# Patient Record
Sex: FEMALE | Race: WHITE | NOT HISPANIC OR LATINO | Employment: OTHER
[De-identification: names, ages, dates, MRNs, and addresses within clinical notes are randomized per-mention and may not be internally consistent; named-entity substitution may affect disease eponyms.]

---

## 2017-08-19 ENCOUNTER — HEALTH MAINTENANCE LETTER (OUTPATIENT)
Age: 67
End: 2017-08-19

## 2017-12-12 ENCOUNTER — OFFICE VISIT (OUTPATIENT)
Dept: DERMATOLOGY | Facility: CLINIC | Age: 67
End: 2017-12-12
Payer: MEDICARE

## 2017-12-12 DIAGNOSIS — R22.9 SUBCUTANEOUS NODULE: ICD-10-CM

## 2017-12-12 DIAGNOSIS — L80 VITILIGO: Primary | ICD-10-CM

## 2017-12-12 DIAGNOSIS — D22.9 MULTIPLE BENIGN NEVI: ICD-10-CM

## 2017-12-12 DIAGNOSIS — L57.0 AK (ACTINIC KERATOSIS): ICD-10-CM

## 2017-12-12 LAB — TSH SERPL DL<=0.005 MIU/L-ACNC: 3.3 MU/L (ref 0.4–4)

## 2017-12-12 PROCEDURE — 17000 DESTRUCT PREMALG LESION: CPT | Performed by: DERMATOLOGY

## 2017-12-12 PROCEDURE — 36415 COLL VENOUS BLD VENIPUNCTURE: CPT | Performed by: DERMATOLOGY

## 2017-12-12 PROCEDURE — 84443 ASSAY THYROID STIM HORMONE: CPT | Performed by: DERMATOLOGY

## 2017-12-12 PROCEDURE — 99213 OFFICE O/P EST LOW 20 MIN: CPT | Mod: 25 | Performed by: DERMATOLOGY

## 2017-12-12 NOTE — LETTER
12/12/2017      RE: Crys Gama  [address] [place] [address]       [facility] Dermatology Note      Dermatology Problem List:  1. Vitiligo, began at age 25    Encounter Date: Dec 12, 2017    CC:  Chief Complaint   Patient presents with     Skin Check     area of concern left wrist and left inner thigh     Derm Problem     vitiligo         History of Present Illness:  Ms. Crys Gama is a 67 year old female who presents as a follow-up for vitiligo. The patient was last seen 12/12/2016 when a nodule on the left thigh was reported and a decision was made to clinically monitor it. She reports her viitlgo may be spreading. Not bothersome and does not want to treat.  The patient has an area of concern on her left wrist present for at least 3 moths and asymptomatic and on her left inner thigh that is a bump present for over 1 year    The patient reports no other lesions of concern.      Past Medical History:   Patient Active Problem List   Diagnosis     Hyperlipidemia     HTN (hypertension)     GERD (gastroesophageal reflux disease)     Asthma, mild intermittent     HYPERLIPIDEMIA LDL GOAL <130     Hypertension goal BP (blood pressure) < 140/90     Advanced directives, counseling/discussion     Osteoporosis     Osteoporosis, post-menopausal     Angioma of skin     Past Medical History:   Diagnosis Date     Asthma, mild intermittent      Family history of colon cancer     had colonoscopy in 2008, due in 5 years     GERD (gastroesophageal reflux disease)      HTN (hypertension)      Hyperlipidemia      Osteoporosis      Vitiligo      Past Surgical History:   Procedure Laterality Date     HC REMOVAL GALLBLADDER  4-1999     SURGICAL HISTORY OF -       wrist and finger surgery for fracture         Social History:  The patient denies use of tanning beds. She had grandchildren.     Family History:  There is a family history of BCC, mother.     Medications:  Current Outpatient  Prescriptions   Medication Sig Dispense Refill     simvastatin (ZOCOR) 10 MG tablet Take 10 mg by mouth At Bedtime       Alendronate Sodium (FOSAMAX PO) Take 5 mg by mouth       KRILL OIL PO Take  by mouth. 2 DAILY       Multiple Vitamin (MULTI-DAY PO) Take 1 tablet by mouth daily.       CALCIUM + D OR 1500/800 daily       GLUCOSAMINE CHONDR 1500 COMPLX OR  tid         Allergies   Allergen Reactions     Azithromycin Hives     Erythromycin Hives     Penicillins Hives     Tetracycline Hives         Review of Systems:  -Const: Denies fevers or chills. Admits to intentional weight loss. Denies changes at home.   -ENT- replacing tooth today with dentist  Physical exam:  There were no vitals taken for this visit.  GEN: This is a well developed, well-nourished female in no acute distress, in a pleasant mood.    SKIN: Total skin excluding the undergarment areas was performed. The exam included the head/face, neck, both arms, chest, back, abdomen, both legs, digits and/or nails. Declines genital exam.   -There are bright red some shaped papules scattered on the trunk and extremtiies along with brown macules.   -1.2cm red patch, left dorsal wrist, completely blanches,   -SQ, pea sized nodule, left thigh  -1mm red macule, right nasal sidewall  -depigmented patches, primarily extremities  -There  1 erythematous macules with overlying adherent scale on the right nasal tip  -No other lesions of concern on areas examined.     Impression/Plan:  1. Multiple clinically benign nevi on the trunk and extremities and cherry angiomas    ABCDs of melanoma were discussed and self skin checks were advised.     Sun precaution was advised including the use of sun screens of SPF 30 or higher, sun protective clothing, and avoidance of tanning beds.      2. Vitiligo:    Discussed importance of sunscreen use and increased risk of burn.    Recheck thyroid with TSH and free T4  3. SQ- left thigh, unchanged    Histopathological exam required for  diagnosis however continue to clinically monitor at this time as she declines biopsy  4. 1.2cm red patch, left dorsal wrist, blanches, consistent with hemangioma    She will report changes  5. Papule, right nasal side wall, 3 month recheck recommended. Declines biopsy today. Most likely excoriated fibrous papule but need to monitor  6. AK, right nasal tip    Cryotherapy procedure note: After verbal consent and discussion of risks and benefits including but no limited to dyspigmentation/scar, blister, and pain, 1was(were) treated with 1-2mm freeze border for 2 cycles with liquid nitrogen. Post cryotherapy instructions were provided.   Follow up in 3 months, earlier for new or changing lesions.       Desi Rossi MD    Department of Dermatology  [facility]: Phone: [phone], Fax:[phone]  [facility]: Phone: [phone], Fax: [phone]        Desi Rossi MD

## 2017-12-12 NOTE — PATIENT INSTRUCTIONS
Cryotherapy    What is it?    Use of a very cold liquid, such as liquid nitrogen, to freeze and destroy abnormal skin cells that need to be removed    What should I expect?    Tenderness and redness    A small blister that might grow and fill with dark purple blood. There may be crusting.    More than one treatment may be needed if the lesions do not go away.    How do I care for the treated area?    Gently wash the area with your hands when bathing.    Use a thin layer of Vaseline to help with healing. You may use a Band-Aid.     The area should heal within 7-10 days and may leave behind a pink or lighter color.     Do not use an antibiotic or Neosporin ointment.     You may take acetaminophen (Tylenol) for pain.     Call your Doctor if you have:    Severe pain    Signs of infection (warmth, redness, cloudy yellow drainage, and or a bad smell)    Questions or concerns    Who should I call with questions?       [facility]: [phone]       [facility]: [phone]       For urgent needs outside of business hours call the [facility] at [phone]        and ask for the dermatology resident on call

## 2017-12-12 NOTE — NURSING NOTE
Dermatology Rooming Note    Crys Gama's goals for this visit include:   Chief Complaint   Patient presents with     Skin Check     area of concern left wrist and left inner thigh     Derm Problem     vitiligo       Is a scribe okay for this visit:YES    Are records needed for this visit(If yes, obtain release of information): No     Vitals: There were no vitals taken for this visit.    Referring Provider:  No referring provider defined for this encounter.    Beth Woody LPN

## 2017-12-12 NOTE — PROGRESS NOTES
[facility] Dermatology Note      Dermatology Problem List:  1. Vitiligo, began at age 25    Encounter Date: Dec 12, 2017    CC:  Chief Complaint   Patient presents with     Skin Check     area of concern left wrist and left inner thigh     Derm Problem     vitiligo         History of Present Illness:  Ms. Crys Gama is a 67 year old female who presents as a follow-up for vitiligo. The patient was last seen 12/12/2016 when a nodule on the left thigh was reported and a decision was made to clinically monitor it. She reports her viitlgo may be spreading. Not bothersome and does not want to treat.  The patient has an area of concern on her left wrist present for at least 3 moths and asymptomatic and on her left inner thigh that is a bump present for over 1 year    The patient reports no other lesions of concern.      Past Medical History:   Patient Active Problem List   Diagnosis     Hyperlipidemia     HTN (hypertension)     GERD (gastroesophageal reflux disease)     Asthma, mild intermittent     HYPERLIPIDEMIA LDL GOAL <130     Hypertension goal BP (blood pressure) < 140/90     Advanced directives, counseling/discussion     Osteoporosis     Osteoporosis, post-menopausal     Angioma of skin     Past Medical History:   Diagnosis Date     Asthma, mild intermittent      Family history of colon cancer     had colonoscopy in 2008, due in 5 years     GERD (gastroesophageal reflux disease)      HTN (hypertension)      Hyperlipidemia      Osteoporosis      Vitiligo      Past Surgical History:   Procedure Laterality Date     HC REMOVAL GALLBLADDER  4-1999     SURGICAL HISTORY OF -       wrist and finger surgery for fracture         Social History:  The patient denies use of tanning beds. She had grandchildren.     Family History:  There is a family history of BCC, mother.     Medications:  Current Outpatient Prescriptions   Medication Sig Dispense Refill     simvastatin (ZOCOR) 10 MG tablet Take 10 mg  by mouth At Bedtime       Alendronate Sodium (FOSAMAX PO) Take 5 mg by mouth       KRILL OIL PO Take  by mouth. 2 DAILY       Multiple Vitamin (MULTI-DAY PO) Take 1 tablet by mouth daily.       CALCIUM + D OR 1500/800 daily       GLUCOSAMINE CHONDR 1500 COMPLX OR  tid         Allergies   Allergen Reactions     Azithromycin Hives     Erythromycin Hives     Penicillins Hives     Tetracycline Hives         Review of Systems:  -Const: Denies fevers or chills. Admits to intentional weight loss. Denies changes at home.   -ENT- replacing tooth today with dentist  Physical exam:  There were no vitals taken for this visit.  GEN: This is a well developed, well-nourished female in no acute distress, in a pleasant mood.    SKIN: Total skin excluding the undergarment areas was performed. The exam included the head/face, neck, both arms, chest, back, abdomen, both legs, digits and/or nails. Declines genital exam.   -There are bright red some shaped papules scattered on the trunk and extremtiies along with brown macules.   -1.2cm red patch, left dorsal wrist, completely blanches,   -SQ, pea sized nodule, left thigh  -1mm red macule, right nasal sidewall  -depigmented patches, primarily extremities  -There  1 erythematous macules with overlying adherent scale on the right nasal tip  -No other lesions of concern on areas examined.     Impression/Plan:  1. Multiple clinically benign nevi on the trunk and extremities and cherry angiomas    ABCDs of melanoma were discussed and self skin checks were advised.     Sun precaution was advised including the use of sun screens of SPF 30 or higher, sun protective clothing, and avoidance of tanning beds.      2. Vitiligo:    Discussed importance of sunscreen use and increased risk of burn.    Recheck thyroid with TSH and free T4  3. SQ- left thigh, unchanged    Histopathological exam required for diagnosis however continue to clinically monitor at this time as she declines biopsy  4. 1.2cm red  patch, left dorsal wrist, blanches, consistent with hemangioma    She will report changes  5. Papule, right nasal side wall, 3 month recheck recommended. Declines biopsy today. Most likely excoriated fibrous papule but need to monitor  6. AK, right nasal tip    Cryotherapy procedure note: After verbal consent and discussion of risks and benefits including but no limited to dyspigmentation/scar, blister, and pain, 1was(were) treated with 1-2mm freeze border for 2 cycles with liquid nitrogen. Post cryotherapy instructions were provided.   Follow up in 3 months, earlier for new or changing lesions.       Desi Rossi MD    Department of Dermatology  [facility]: Phone: [phone], Fax:[phone]  [facility]: Phone: [phone], Fax: [phone]

## 2017-12-12 NOTE — MR AVS SNAPSHOT
After Visit Summary   12/12/2017    Crys Gama    MRN: [id number]           Patient Information     Date Of Birth          1950        Visit Information        Provider Department      12/12/2017 9:00 AM Desi Rossi MD [facility]        Today's Diagnoses     Vitiligo    -  1    Subcutaneous nodule        Multiple benign nevi        AK (actinic keratosis)          Care Instructions    Cryotherapy    What is it?    Use of a very cold liquid, such as liquid nitrogen, to freeze and destroy abnormal skin cells that need to be removed    What should I expect?    Tenderness and redness    A small blister that might grow and fill with dark purple blood. There may be crusting.    More than one treatment may be needed if the lesions do not go away.    How do I care for the treated area?    Gently wash the area with your hands when bathing.    Use a thin layer of Vaseline to help with healing. You may use a Band-Aid.     The area should heal within 7-10 days and may leave behind a pink or lighter color.     Do not use an antibiotic or Neosporin ointment.     You may take acetaminophen (Tylenol) for pain.     Call your Doctor if you have:    Severe pain    Signs of infection (warmth, redness, cloudy yellow drainage, and or a bad smell)    Questions or concerns    Who should I call with questions?       [facility]: [phone]       [facility]: [phone]       For urgent needs outside of business hours call the [facility] at [phone]        and ask for the dermatology resident on call            Follow-ups after your visit        Follow-up notes from your care team     Return in about 3 months (around 3/12/2018) for checking 2 spots.      Your next 10 appointments already scheduled     Apr 26, 2018 10:30 AM CDT   Return Visit with Desi Rossi MD   [facility] ([facility])     [address] [address] [place] [address] [phone]              Who to contact     If you have questions or need follow up information about today's clinic visit or your schedule please contact [facility] directly at [phone].  Normal or non-critical lab and imaging results will be communicated to you by MyChart, letter or phone within 4 business days after the clinic has received the results. If you do not hear from us within 7 days, please contact the clinic through [employer]chmasch[employer] publishing[employer] or phone. If you have a critical or abnormal lab result, we will notify you by phone as soon as possible.  Submit refill requests through [employer] or call your pharmacy and they will forward the refill request to us. Please allow 3 business days for your refill to be completed.          Additional Information About Your Visit        MyChart Information     [employer] gives you secure access to your electronic health record. If you see a primary care provider, you can also send messages to your care team and make appointments. If you have questions, please call your primary care clinic.  If you do not have a primary care provider, please call [phone] and they will assist you.      [employer] is an electronic gateway that provides easy, online access to your medical records. With [employer], you can request a clinic appointment, read your test results, renew a prescription or communicate with your care team.     To access your existing account, please contact your [facility] or call [phone] for assistance.        Care EveryWhere ID     This is your Care EveryWhere ID. This could be used by other organizations to access your [facility] medical records  [id number]         Blood Pressure from Last 3 Encounters:   07/12/14 110/74   02/20/13 122/82   08/29/12 120/70    Weight from Last 3 Encounters:   07/12/14 76.2 kg (168 lb)   02/20/13 79 kg (174 lb 4 oz)   08/29/12 77.6 kg (171 lb)               We Performed the Following     DESTRUCT PREMALIGNANT LESION, FIRST     TSH with free T4 reflex        Primary Care Provider Office Phone # Fax #    Daniel Vargas [phone]       [address] [facility]  [facility] [address]        Equal Access to Services     DUANE HORN : Hadii aad ku hadasho Soomaali, waaxda luqadaha, qaybta kaalmada adeegyada, waxay kylein hayaan adejose a sheldondamionelena vasques . So [facility] [phone].    ATENCIÓN: Si habla español, tiene a best disposición servicios gratuitos de asistencia lingüística. Llame al [phone].    We comply with applicable federal civil rights laws and [place] laws. We do not discriminate on the basis of race, color, national origin, age, disability, sex, sexual orientation, or gender identity.            Thank you!     Thank you for choosing [facility]  for your care. Our goal is always to provide you with excellent care. Hearing back from our patients is one way we can continue to improve our services. Please take a few minutes to complete the written survey that you may receive in the mail after your visit with us. Thank you!             Your Updated Medication List - Protect others around you: Learn how to safely use, store and throw away your medicines at www.disposemymeds.org.          This list is accurate as of: 12/12/17  9:35 AM.  Always use your most recent med list.                   Brand Name Dispense Instructions for use Diagnosis    CALCIUM + D PO      1500/800 daily        FOSAMAX PO      Take 5 mg by mouth        GLUCOSAMINE CHONDR 1500 COMPLX PO      tid        KRILL OIL PO      Take  by mouth. 2 DAILY        MULTI-DAY PO      Take 1 tablet by mouth daily.        simvastatin 10 MG tablet    ZOCOR     Take 10 mg by mouth At Bedtime

## 2018-04-26 ENCOUNTER — OFFICE VISIT (OUTPATIENT)
Dept: DERMATOLOGY | Facility: CLINIC | Age: 68
End: 2018-04-26
Payer: MEDICARE

## 2018-04-26 DIAGNOSIS — L90.5 SCAR: Primary | ICD-10-CM

## 2018-04-26 PROCEDURE — 99213 OFFICE O/P EST LOW 20 MIN: CPT | Performed by: DERMATOLOGY

## 2018-04-26 NOTE — PROGRESS NOTES
[facility] Dermatology Note      Dermatology Problem List:  1. Vitiligo - began at age 25    Encounter Date: Apr 26, 2018    CC:  Chief Complaint   Patient presents with     RECHECK     4 month left nasal sidewall and left thigh no changes with it      Derm Problem     spot on back          History of Present Illness:  Ms. Crys Gama is a 67 year old female who presents as a follow-up for lesions on her left nasal sidewall and left thigh. The patient was last seen 12/12/17 when 1 AK was treated with cryotherapy on her right nasal tip.  The patietn states that there are no changes with either lesions.  She also has a new lesion on her back that she is concerned about.IT is itchy.     No other lesions of concern today.     Past Medical History:   Patient Active Problem List   Diagnosis     Hyperlipidemia     HTN (hypertension)     GERD (gastroesophageal reflux disease)     Asthma, mild intermittent     HYPERLIPIDEMIA LDL GOAL <130     Hypertension goal BP (blood pressure) < 140/90     Advanced directives, counseling/discussion     Osteoporosis     Osteoporosis, post-menopausal     Angioma of skin     Past Medical History:   Diagnosis Date     Asthma, mild intermittent      Family history of colon cancer     had colonoscopy in 2008, due in 5 years     GERD (gastroesophageal reflux disease)      HTN (hypertension)      Hyperlipidemia      Osteoporosis      Vitiligo      Past Surgical History:   Procedure Laterality Date     HC REMOVAL GALLBLADDER  4-1999     SURGICAL HISTORY OF -       wrist and finger surgery for fracture       Social History:  The patient denies use of tanning beds.  She has granchildren    Family History:  There is a family history of non-melanoma skin cancer in the patient's mother.    Medications:  Current Outpatient Prescriptions   Medication Sig Dispense Refill     Alendronate Sodium (FOSAMAX PO) Take 5 mg by mouth       CALCIUM + D OR 1500/800 daily       GLUCOSAMINE  CHONDR 1500 COMPLX OR  tid       KRILL OIL PO Take  by mouth. 2 DAILY       Multiple Vitamin (MULTI-DAY PO) Take 1 tablet by mouth daily.       simvastatin (ZOCOR) 10 MG tablet Take 10 mg by mouth At Bedtime         Allergies   Allergen Reactions     Azithromycin Hives     Erythromycin Hives     Penicillins Hives     Tetracycline Hives       Review of Systems:     -Constitutional: The patient denies fatigue, fevers, chills, unintended weight loss, and night sweats.       Physical exam:  Vitals: There were no vitals taken for this visit.  GEN: This is a well developed, well-nourished female in no acute distress, in a pleasant mood.    SKIN: Focused examination of the the area on the right shoulder, nose, thigh, left wrist  was performed.  -There are dome shaped bright red papules on the right shoulder with stuck on papule, mid back.   -normal exam of nose  -skin colored fleshy papule that dimples on left thigh  -1.2cm red patch, left dorsal wrist, blanches, consistent with hemangioma  -No other lesions of concern on areas examined.       Impression/Plan:    1. Papule - right nasal sidewall    Resolved     2. Cherry angioma, SKs, right shoulder    No further intervention required. Patient to report changes.     4.1.2cm red patch, left dorsal wrist, blanches, consistent with hemangioma-unchanged, appears to be a vascular lesion    Declines biopsy will monitor  5. Dimpling papule- left thigh, unchanged-consistent with DF, unchange    Declines biopsy     Follow-up in 1 year, earlier for new or changing lesions.       Staff Involved:  Scribe/Staff    Scribe Disclosure:   I, Valery Childress, am serving as a scribe to document services personally performed by Dr. Desi Rossi, based on data collection and the provider's statements to me.     Provider Disclosure:   The documentation recorded by the scribe accurately reflects the services I personally performed and the decisions made by me.    Desi Rossi MD  Assistant  Professor  Department of Dermatology  [facility]: Phone: [phone], Fax:[phone]  [facility]: Phone: [phone], Fax: [phone]

## 2018-04-26 NOTE — MR AVS SNAPSHOT
After Visit Summary   4/26/2018    Crys Gama    MRN: [id number]           Patient Information     Date Of Birth          1950        Visit Information        Provider Department      4/26/2018 10:30 AM Desi Rossi MD [facility]        Today's Diagnoses     Scar    -  1       Follow-ups after your visit        Follow-up notes from your care team     Return in about 1 year (around 4/26/2019) for skin lesion follow up.      Who to contact     If you have questions or need follow up information about today's clinic visit or your schedule please contact [facility] directly at [phone].  Normal or non-critical lab and imaging results will be communicated to you by MyChart, letter or phone within 4 business days after the clinic has received the results. If you do not hear from us within 7 days, please contact the clinic through [employer] or phone. If you have a critical or abnormal lab result, we will notify you by phone as soon as possible.  Submit refill requests through [email or web address] or call your pharmacy and they will forward the refill request to us. Please allow 3 business days for your refill to be completed.          Additional Information About Your Visit        MyChart Information     [email or web address] gives you secure access to your electronic health record. If you see a primary care provider, you can also send messages to your care team and make appointments. If you have questions, please call your primary care clinic.  If you do not have a primary care provider, please call [phone] and they will assist you.      [email or web address] is an electronic gateway that provides easy, online access to your medical records. With [email or web address], you can request a clinic appointment, read your test results, renew a prescription or communicate with your care team.     To access your existing account, please contact your [facility] or call [phone] for  assistance.        Care EveryWhere ID     This is your Care EveryWhere ID. This could be used by other organizations to access your [place] medical records  [id number]         Blood Pressure from Last 3 Encounters:   07/12/14 110/74   02/20/13 122/82   08/29/12 120/70    Weight from Last 3 Encounters:   07/12/14 168 lb (76.2 kg)   02/20/13 174 lb 4 oz (79 kg)   08/29/12 171 lb (77.6 kg)              Today, you had the following     No orders found for display       Primary Care Provider Office Phone # Fax #    Daniel Vargas [phone]       [address] CRUZ [address] [place] [address]        Equal Access to Services     DUANE HORN : Hadii daiana ku hadasho Soomaali, waaxda luqadaha, qaybta kaalmada adeegyada, waxay maria c marx. So [facility] [phone].    ATENCIÓN: Si habla español, tiene a best disposición servicios gratuitos de asistencia lingüística. [facility] [phone].    We comply with applicable federal civil rights laws and [place] laws. We do not discriminate on the basis of race, color, national origin, age, disability, sex, sexual orientation, or gender identity.            Thank you!     Thank you for choosing [facility]  for your care. Our goal is always to provide you with excellent care. Hearing back from our patients is one way we can continue to improve our services. Please take a few minutes to complete the written survey that you may receive in the mail after your visit with us. Thank you!             Your Updated Medication List - Protect others around you: Learn how to safely use, store and throw away your medicines at www.disposemymeds.org.          This list is accurate as of 4/26/18 10:39 AM.  Always use your most recent med list.                   Brand Name Dispense Instructions for use Diagnosis    CALCIUM + D PO      1500/800 daily        FOSAMAX PO      Take 5 mg by mouth        GLUCOSAMINE CHONDR 1500  COMPLX PO      tid        KRILL OIL PO      Take  by mouth. 2 DAILY        MULTI-DAY PO      Take 1 tablet by mouth daily.        simvastatin 10 MG tablet    ZOCOR     Take 10 mg by mouth At Bedtime

## 2018-04-26 NOTE — LETTER
4/26/2018         RE: Crys Gama  [address]  [place] [facility] [address]        Dear Colleague,    Thank you for referring your patient, Crys Gama, to the [facility]. Please see a copy of my visit note below.    [facility] Dermatology Note      Dermatology Problem List:  1. Vitiligo - began at age 25    Encounter Date: Apr 26, 2018    CC:  Chief Complaint   Patient presents with     RECHECK     4 month left nasal sidewall and left thigh no changes with it      Derm Problem     spot on back          History of Present Illness:  Ms. Crys Gama is a 67 year old female who presents as a follow-up for lesions on her left nasal sidewall and left thigh. The patient was last seen 12/12/17 when 1 AK was treated with cryotherapy on her right nasal tip.  The patietn states that there are no changes with either lesions.  She also has a new lesion on her back that she is concerned about.IT is itchy.     No other lesions of concern today.     Past Medical History:   Patient Active Problem List   Diagnosis     Hyperlipidemia     HTN (hypertension)     GERD (gastroesophageal reflux disease)     Asthma, mild intermittent     HYPERLIPIDEMIA LDL GOAL <130     Hypertension goal BP (blood pressure) < 140/90     Advanced directives, counseling/discussion     Osteoporosis     Osteoporosis, post-menopausal     Angioma of skin     Past Medical History:   Diagnosis Date     Asthma, mild intermittent      Family history of colon cancer     had colonoscopy in 2008, due in 5 years     GERD (gastroesophageal reflux disease)      HTN (hypertension)      Hyperlipidemia      Osteoporosis      Vitiligo      Past Surgical History:   Procedure Laterality Date     HC REMOVAL GALLBLADDER  4-1999     SURGICAL HISTORY OF -       wrist and finger surgery for fracture       Social History:  The patient denies use of tanning beds.  She has granchildren    Family History:  There is a family  history of non-melanoma skin cancer in the patient's mother.    Medications:  Current Outpatient Prescriptions   Medication Sig Dispense Refill     Alendronate Sodium (FOSAMAX PO) Take 5 mg by mouth       CALCIUM + D OR 1500/800 daily       GLUCOSAMINE CHONDR 1500 COMPLX OR  tid       KRILL OIL PO Take  by mouth. 2 DAILY       Multiple Vitamin (MULTI-DAY PO) Take 1 tablet by mouth daily.       simvastatin (ZOCOR) 10 MG tablet Take 10 mg by mouth At Bedtime         Allergies   Allergen Reactions     Azithromycin Hives     Erythromycin Hives     Penicillins Hives     Tetracycline Hives       Review of Systems:     -Constitutional: The patient denies fatigue, fevers, chills, unintended weight loss, and night sweats.       Physical exam:  Vitals: There were no vitals taken for this visit.  GEN: This is a well developed, well-nourished female in no acute distress, in a pleasant mood.    SKIN: Focused examination of the the area on the right shoulder, nose, thigh, left wrist  was performed.  -There are dome shaped bright red papules on the right shoulder with stuck on papule, mid back.   -normal exam of nose  -skin colored fleshy papule that dimples on left thigh  -1.2cm red patch, left dorsal wrist, blanches, consistent with hemangioma  -No other lesions of concern on areas examined.       Impression/Plan:    1. Papule - right nasal sidewall    Resolved     2. Cherry angioma, SKs, right shoulder    No further intervention required. Patient to report changes.     4.1.2cm red patch, left dorsal wrist, blanches, consistent with hemangioma-unchanged, appears to be a vascular lesion    Declines biopsy will monitor  5. Dimpling papule- left thigh, unchanged-consistent with DF, unchange    Declines biopsy     Follow-up in 1 year, earlier for new or changing lesions.       Staff Involved:  Scribe/Staff    Scribe Disclosure:   IValery, am serving as a scribe to document services personally performed by Dr. Desi Rossi,  based on data collection and the provider's statements to me.     Provider Disclosure:   The documentation recorded by the scribe accurately reflects the services I personally performed and the decisions made by me.    Desi Rossi MD    Department of Dermatology  [facility]: Phone: [phone], Fax:[phone]  [facility]: Phone: [phone], Fax: [phone]          Again, thank you for allowing me to participate in the care of your patient.        Sincerely,        Desi Rossi MD

## 2019-04-19 ENCOUNTER — OFFICE VISIT (OUTPATIENT)
Dept: DERMATOLOGY | Facility: CLINIC | Age: 69
End: 2019-04-19
Payer: MEDICARE

## 2019-04-19 DIAGNOSIS — D23.9 DERMATOFIBROMA: ICD-10-CM

## 2019-04-19 DIAGNOSIS — L30.9 DERMATITIS: ICD-10-CM

## 2019-04-19 DIAGNOSIS — D48.5 NEOPLASM OF UNCERTAIN BEHAVIOR OF SKIN: ICD-10-CM

## 2019-04-19 DIAGNOSIS — L57.0 ACTINIC KERATOSIS: ICD-10-CM

## 2019-04-19 DIAGNOSIS — D18.01 CHERRY ANGIOMA: ICD-10-CM

## 2019-04-19 DIAGNOSIS — D18.00 ANGIOMA: Primary | ICD-10-CM

## 2019-04-19 DIAGNOSIS — L82.1 SK (SEBORRHEIC KERATOSIS): ICD-10-CM

## 2019-04-19 DIAGNOSIS — L80 VITILIGO: ICD-10-CM

## 2019-04-19 PROCEDURE — 87220 TISSUE EXAM FOR FUNGI: CPT | Performed by: DERMATOLOGY

## 2019-04-19 PROCEDURE — 11102 TANGNTL BX SKIN SINGLE LES: CPT | Performed by: DERMATOLOGY

## 2019-04-19 PROCEDURE — 99213 OFFICE O/P EST LOW 20 MIN: CPT | Mod: 25 | Performed by: DERMATOLOGY

## 2019-04-19 PROCEDURE — 88305 TISSUE EXAM BY PATHOLOGIST: CPT | Mod: TC | Performed by: DERMATOLOGY

## 2019-04-19 PROCEDURE — 17000 DESTRUCT PREMALG LESION: CPT | Mod: 59 | Performed by: DERMATOLOGY

## 2019-04-19 RX ORDER — LANOLIN ALCOHOL/MO/W.PET/CERES
1000 CREAM (GRAM) TOPICAL DAILY
COMMUNITY

## 2019-04-19 RX ORDER — KETOCONAZOLE 20 MG/G
CREAM TOPICAL
Qty: 60 G | Refills: 1 | Status: SHIPPED | OUTPATIENT
Start: 2019-04-19 | End: 2022-04-08

## 2019-04-19 RX ORDER — TRIAMCINOLONE ACETONIDE 1 MG/G
CREAM TOPICAL
Qty: 15 G | Refills: 0 | Status: SHIPPED | OUTPATIENT
Start: 2019-04-19 | End: 2022-04-08

## 2019-04-19 ASSESSMENT — PAIN SCALES - GENERAL: PAINLEVEL: NO PAIN (0)

## 2019-04-19 NOTE — PATIENT INSTRUCTIONS
Cryotherapy    What is it?    Use of a very cold liquid, such as liquid nitrogen, to freeze and destroy abnormal skin cells that need to be removed    What should I expect?    Tenderness and redness    A small blister that might grow and fill with dark purple blood. There may be crusting.    More than one treatment may be needed if the lesions do not go away.    How do I care for the treated area?    Gently wash the area with your hands when bathing.    Use a thin layer of Vaseline to help with healing. You may use a Band-Aid.     The area should heal within 7-10 days and may leave behind a pink or lighter color.     Do not use an antibiotic or Neosporin ointment.     You may take acetaminophen (Tylenol) for pain.     Call your Doctor if you have:    Severe pain    Signs of infection (warmth, redness, cloudy yellow drainage, and or a bad smell)    Questions or concerns    Who should I call with questions?       [facility]: [phone]       [facility]: [phone]       For urgent needs outside of business hours call the [facility] at [phone]        and ask for the dermatology resident on call    Wound Care After a Biopsy    What is a skin biopsy?  A skin biopsy allows the doctor to examine a very small piece of tissue under the microscope to determine the diagnosis and the best treatment for the skin condition. A local anesthetic (numbing medicine)  is injected with a very small needle into the skin area to be tested. A small piece of skin is taken from the area. Sometimes a suture (stitch) is used.     What are the risks of a skin biopsy?  I will experience scar, bleeding, swelling, pain, crusting and redness. I may experience incomplete removal or recurrence. Risks of this procedure are excessive bleeding, bruising, infection, nerve damage, numbness, thick (hypertrophic or keloidal) scar and non-diagnostic biopsy.    How should I care  for my wound for the first 24 hours?    Keep the wound dry and covered for 24 hours    If it bleeds, hold direct pressure on the area for 15 minutes. If bleeding does not stop then go to the emergency room    Avoid strenuous exercise the first 1-2 days or as your doctor instructs you    How should I care for the wound after 24 hours?    After 24 hours, remove the bandage    You may bathe or shower as normal    If you had a scalp biopsy, you can shampoo as usual and can use shower water to clean the biopsy site daily    Clean the wound twice a day with gentle soap and water    Do not scrub, be gentle    Apply white petroleum/Vaseline after cleaning the wound with a cotton swab or a clean finger, and keep the site covered with a Bandaid /bandage. Bandages are not necessary with a scalp biopsy    If you are unable to cover the site with a Bandaid /bandage, re-apply ointment 2-3 times a day to keep the site moist. Moisture will help with healing    Avoid strenuous activity for first 1-2 days    Avoid lakes, rivers, pools, and oceans until the stitches are removed or the site is healed    How do I clean my wound?    Wash hands thoroughly with soap or use hand  before all wound care    Clean the wound with gentle soap and water    Apply white petroleum/Vaseline  to wound after it is clean    Replace the Bandaid /bandage to keep the wound covered for the first few days or as instructed by your doctor    If you had a scalp biopsy, warm shower water to the area on a daily basis should suffice    What should I use to clean my wound?     Cotton-tipped applicators (Qtips )    White petroleum jelly (Vaseline ). Use a clean new container and use Q-tips to apply.    Bandaids   as needed    Gentle soap     How should I care for my wound long term?    Do not get your wound dirty    Keep up with wound care for one week or until the area is healed.    A small scab will form and fall off by itself when the area is completely  healed. The area will be red and will become pink in color as it heals. Sun protection is very important for how your scar will turn out. Sunscreen with an SPF 30 or greater is recommended once the area is healed.    You should have some soreness but it should be mild and slowly go away over several days. Talk to your doctor about using tylenol for pain,    When should I call my doctor?  If you have increased:     Pain or swelling    Pus or drainage (clear or slightly yellow drainage is ok)    Temperature over 100F    Spreading redness or warmth around wound    When will I hear about my results?  The biopsy results can take 2-3 weeks to come back. The clinic will call you with the results, send you a [employer] message, or have you schedule a follow-up clinic or phone time to discuss the results. Contact our clinics if you do not hear from us in 3 weeks.     Who should I call with questions?    [facility]: [phone]     [facility]: [phone]    For urgent needs outside of business hours call the [facility] at [phone] and ask for the dermatology resident on call

## 2019-04-19 NOTE — LETTER
4/19/2019         RE: Crys Gama  [address] [place] [place] [address]        Dear Colleague,    Thank you for referring your patient, Crys Gama, to the [facility]. Please see a copy of my visit note below.    [facility] Dermatology Note      Dermatology Problem List:  1. Vitiligo - began at age 25    Encounter Date: Apr 19, 2019    CC:  Chief Complaint   Patient presents with     Follow Up     for spot check left nasal sidewall, left thigh and right thigh.          History of Present Illness:  Ms. Crys Gama is a 68 year old female who presents as a follow-up for lesions on her left nasal sidewall and left thigh. The patient was last seen 4/26/18 when these spots were first identified. Today, the patient reports that she has a new area of concern on the left wrist. There is also a new itchy spot on the left ankle that first appeared in the winter.     Past Medical History:   Patient Active Problem List   Diagnosis     Hyperlipidemia     HTN (hypertension)     GERD (gastroesophageal reflux disease)     Asthma, mild intermittent     HYPERLIPIDEMIA LDL GOAL <130     Hypertension goal BP (blood pressure) < 140/90     Advanced directives, counseling/discussion     Osteoporosis     Osteoporosis, post-menopausal     Angioma of skin     Past Medical History:   Diagnosis Date     Asthma, mild intermittent      Family history of colon cancer     had colonoscopy in 2008, due in 5 years     GERD (gastroesophageal reflux disease)      HTN (hypertension)      Hyperlipidemia      Osteoporosis      Vitiligo      Past Surgical History:   Procedure Laterality Date     HC REMOVAL GALLBLADDER  4-1999     SURGICAL HISTORY OF -       wrist and finger surgery for fracture       Social History:  The patient denies use of tanning beds.  She has granchildren  Kept in chart for convenience.       Family History:  There is a family history of non-melanoma skin cancer in the patient's  mother.  Kept in chart for convenience.       Medications:  Current Outpatient Medications   Medication Sig Dispense Refill     Alendronate Sodium (FOSAMAX PO) Take 5 mg by mouth       cyanocobalamin (VITAMIN B-12) 1000 MCG tablet Take 1,000 mcg by mouth daily       simvastatin (ZOCOR) 10 MG tablet Take 10 mg by mouth At Bedtime       CALCIUM + D OR 1500/800 daily       GLUCOSAMINE CHONDR 1500 COMPLX OR  tid       KRILL OIL PO Take  by mouth. 2 DAILY       Multiple Vitamin (MULTI-DAY PO) Take 1 tablet by mouth daily.         Allergies   Allergen Reactions     Azithromycin Hives     Erythromycin Hives     Penicillins Hives     Tetracycline Hives       Review of Systems:  -Constitutional: Otherwise feeling well, in usual state of health.        Physical exam:  Vitals: There were no vitals taken for this visit.  GEN: This is a well developed, well-nourished female in no acute distress, in a pleasant mood.    SKIN: Total skin excluding the undergarment areas was performed. The exam included the head/face, neck, both arms, chest, back, abdomen, both legs, digits and/or nails. Declines genital exam  - Eczematous plaque on the left ankle.   - There is a dome shaped bright red papule on the mid back, trunk and extremities   - There are waxy stuck on tan to brown papules on the trunk and extremities.  - 1.2 cm red patch, left dorsal wrist  - There is an erythematous macule with overyling adherent scale on the left cheek..   - There is a firm tan/flesh colored papule that dimples with lateral pressure on the left thigh.  - Depigmented patches, >30% TBSA, upper  And lower extremities.   - Pigmented patch, 1 cm, on the right mid back  -No other lesions of concern on areas examined.       Impression/Plan:  1. Actinic keratosis, left cheek  Cryotherapy procedure note: After verbal consent and discussion of risks and benefits including but no limited to dyspigmentation/scar, blister, and pain, 1 was(were) treated with 1-2mm freeze  border for 2 cycles with liquid nitrogen. Post cryotherapy instructions were provided.     2.1.2cm red patch, left dorsal wrist, - unchanged, consistent with hemangioma.     We will continue to monitor     3. Dimpling papule- left thigh, unchanged-consistent with DF, unchanged    We will continue to monitor this site.      4. SKs, cherry angiomas.     No further intervention needed.     5. Vitiligo  No further intervention needed. PT declines treatment  6. Pigmented patch, 1 cm, on the right mid back. NUB. Differential includes nevus vs other.     Shave biopsy:  After discussion of benefits and risks including but not limited to bleeding/bruising, pain/swelling, infection, scar, incomplete removal, nerve damage/numbness, recurrence, and non-diagnostic biopsy, written consent, verbal consent and photographs were obtained. Time-out was performed. The area was cleaned with isopropyl alcohol. 0.5mL of 1% lidocaine with epinephrine was injected to obtain adequate anesthesia of the lesion on the right mid back. A shave biopsy was performed. Hemostasis was achieved with aluminium chloride. Vaseline and a sterile dressing were applied. The patient tolerated the procedure and no complications were noted. The patient was provided with verbal and written post care instructions.     7. Eczematous plaque on the left ankle. KOH negative.    Start triamcinolone 0.1% cream BID for the next two weeks.     Start ketoconazole 2% cream BID for the next 6 weeks on the feet.     Follow-up in [address] [place] [address]      Staff Involved:  Scribe/Staff    Scribe Disclosure  I, Nii Howell, am serving as a scribe to document services personally performed by Dr. Desi Rossi MD, based on data collection and the provider's statements to me.     Provider Disclosure:   The documentation recorded by the scribe accurately reflects the services I personally performed and the decisions made by me.    Desi Rossi MD    Department of  Dermatology  [facility]: Phone: [phone], Fax:[phone]  [facility]: Phone: [phone], Fax: [phone]        Again, thank you for allowing me to participate in the care of your patient.        Sincerely,        Desi Rossi MD

## 2019-04-19 NOTE — PROGRESS NOTES
[facility] Dermatology Note      Dermatology Problem List:  1. Vitiligo - began at age 25    Encounter Date: Apr 19, 2019    CC:  Chief Complaint   Patient presents with     Follow Up     for spot check left nasal sidewall, left thigh and right thigh.          History of Present Illness:  Ms. Crys Gama is a 68 year old female who presents as a follow-up for lesions on her left nasal sidewall and left thigh. The patient was last seen 4/26/18 when these spots were first identified. Today, the patient reports that she has a new area of concern on the left wrist. There is also a new itchy spot on the left ankle that first appeared in the winter.     Past Medical History:   Patient Active Problem List   Diagnosis     Hyperlipidemia     HTN (hypertension)     GERD (gastroesophageal reflux disease)     Asthma, mild intermittent     HYPERLIPIDEMIA LDL GOAL <130     Hypertension goal BP (blood pressure) < 140/90     Advanced directives, counseling/discussion     Osteoporosis     Osteoporosis, post-menopausal     Angioma of skin     Past Medical History:   Diagnosis Date     Asthma, mild intermittent      Family history of colon cancer     had colonoscopy in 2008, due in 5 years     GERD (gastroesophageal reflux disease)      HTN (hypertension)      Hyperlipidemia      Osteoporosis      Vitiligo      Past Surgical History:   Procedure Laterality Date     HC REMOVAL GALLBLADDER  4-1999     SURGICAL HISTORY OF -       wrist and finger surgery for fracture       Social History:  The patient denies use of tanning beds.  She has granchildren  Kept in chart for convenience.       Family History:  There is a family history of non-melanoma skin cancer in the patient's mother.  Kept in chart for convenience.       Medications:  Current Outpatient Medications   Medication Sig Dispense Refill     Alendronate Sodium (FOSAMAX PO) Take 5 mg by mouth       cyanocobalamin (VITAMIN B-12) 1000 MCG tablet Take 1,000  mcg by mouth daily       simvastatin (ZOCOR) 10 MG tablet Take 10 mg by mouth At Bedtime       CALCIUM + D OR 1500/800 daily       GLUCOSAMINE CHONDR 1500 COMPLX OR  tid       KRILL OIL PO Take  by mouth. 2 DAILY       Multiple Vitamin (MULTI-DAY PO) Take 1 tablet by mouth daily.         Allergies   Allergen Reactions     Azithromycin Hives     Erythromycin Hives     Penicillins Hives     Tetracycline Hives       Review of Systems:  -Constitutional: Otherwise feeling well, in usual state of health.        Physical exam:  Vitals: There were no vitals taken for this visit.  GEN: This is a well developed, well-nourished female in no acute distress, in a pleasant mood.    SKIN: Total skin excluding the undergarment areas was performed. The exam included the head/face, neck, both arms, chest, back, abdomen, both legs, digits and/or nails. Declines genital exam  - Eczematous plaque on the left ankle.   - There is a dome shaped bright red papule on the mid back, trunk and extremities   - There are waxy stuck on tan to brown papules on the trunk and extremities.  - 1.2 cm red patch, left dorsal wrist  - There is an erythematous macule with overyling adherent scale on the left cheek..   - There is a firm tan/flesh colored papule that dimples with lateral pressure on the left thigh.  - Depigmented patches, >30% TBSA, upper  And lower extremities.   - Pigmented patch, 1 cm, on the right mid back  -No other lesions of concern on areas examined.       Impression/Plan:  1. Actinic keratosis, left cheek  Cryotherapy procedure note: After verbal consent and discussion of risks and benefits including but no limited to dyspigmentation/scar, blister, and pain, 1 was(were) treated with 1-2mm freeze border for 2 cycles with liquid nitrogen. Post cryotherapy instructions were provided.     2.1.2cm red patch, left dorsal wrist, - unchanged, consistent with hemangioma.     We will continue to monitor     3. Dimpling papule- left thigh,  unchanged-consistent with DF, unchanged    We will continue to monitor this site.      4. SKs, cherry angiomas.     No further intervention needed.     5. Vitiligo  No further intervention needed. PT declines treatment  6. Pigmented patch, 1 cm, on the right mid back. NUB. Differential includes nevus vs other.     Shave biopsy:  After discussion of benefits and risks including but not limited to bleeding/bruising, pain/swelling, infection, scar, incomplete removal, nerve damage/numbness, recurrence, and non-diagnostic biopsy, written consent, verbal consent and photographs were obtained. Time-out was performed. The area was cleaned with isopropyl alcohol. 0.5mL of 1% lidocaine with epinephrine was injected to obtain adequate anesthesia of the lesion on the right mid back. A shave biopsy was performed. Hemostasis was achieved with aluminium chloride. Vaseline and a sterile dressing were applied. The patient tolerated the procedure and no complications were noted. The patient was provided with verbal and written post care instructions.     7. Eczematous plaque on the left ankle. KOH negative.    Start triamcinolone 0.1% cream BID for the next two weeks.     Start ketoconazole 2% cream BID for the next 6 weeks on the feet.     Follow-up in [address] [place] [address]      Staff Involved:  Scribe/Staff    Scribe Disclosure  I, Nii Howell, am serving as a scribe to document services personally performed by Dr. Desi Rossi MD, based on data collection and the provider's statements to me.     Provider Disclosure:   The documentation recorded by the scribe accurately reflects the services I personally performed and the decisions made by me.    Desi Rossi MD    Department of Dermatology  [facility]: Phone: [phone], Fax:[phone]  [facility]: Phone: [phone], Fax: [phone]

## 2019-04-19 NOTE — NURSING NOTE
Crys Gama's goals for this visit include:   Chief Complaint   Patient presents with     Follow Up     for spot check left nasal sidewall, left thigh and right thigh.      She requests these members of her care team be copied on today's visit information:     PCP: Daniel Vargas    Referring Provider:  No referring provider defined for this encounter.    There were no vitals taken for this visit.    Do you need any medication refills at today's visit? No    Ricarda Wise LPN

## 2019-04-23 LAB — COPATH REPORT: NORMAL

## 2019-11-06 ENCOUNTER — HEALTH MAINTENANCE LETTER (OUTPATIENT)
Age: 69
End: 2019-11-06

## 2020-02-16 ENCOUNTER — HEALTH MAINTENANCE LETTER (OUTPATIENT)
Age: 70
End: 2020-02-16

## 2020-04-21 ENCOUNTER — VIRTUAL VISIT (OUTPATIENT)
Dept: DERMATOLOGY | Facility: CLINIC | Age: 70
End: 2020-04-21
Payer: MEDICARE

## 2020-04-21 DIAGNOSIS — L30.9 DERMATITIS: Primary | ICD-10-CM

## 2020-04-21 PROCEDURE — 99441 ZZC PHYSICIAN TELEPHONE EVALUATION 5-10 MIN: CPT | Performed by: DERMATOLOGY

## 2020-04-21 RX ORDER — TRIAMCINOLONE ACETONIDE 1 MG/G
CREAM TOPICAL
Qty: 60 G | Refills: 0 | Status: SHIPPED | OUTPATIENT
Start: 2020-04-21 | End: 2022-04-20

## 2020-04-21 ASSESSMENT — PAIN SCALES - GENERAL: PAINLEVEL: NO PAIN (0)

## 2020-04-21 NOTE — NURSING NOTE
"Teledermatology Nurse Call for RETURN patients seen within the last 3 years:    The patient was contacted by phone and we reviewed, \"Due to the coronavirus pandemic, we are calling to review your visit and offer you a teledermatology visit where you send in photos via [employer]. These photos will be seen by an MD or CHELI. This will be billed to you and your insurance.\"  The patient was also told that \"a teledermatology visit is not as thorough as an in-person visit and that the quality of the photograph sent may not be of the same quality as that taken by the dermatology clinic, but the patient would like to proceed with an teledermatology because of National Emergency Regarding Coronavirus disease (COVID 19) Outbreak.\"  \"If a prescription is necessary we can send it directly to your pharmacy.  If lab work is needed we can place an order for that and you can then stop by our lab to have the test done at a later time.\"    The patient understood that they may receive a call from the clinic to review additional history, may still be instructed to come to clinic even after photo review and be billed for both visits with an Desi Rossi MD. They were told that a photo assessment does not replace an in person skin exam. The patient understood that teledermatology is not for urgent issues and would require up to 3 business days for review. The patient denied skin pain, fever, mucosal symptoms (lesions, blisters, sores in the mouth, nose, eyes, or genitals). IF PATIENT ENDORSES ANY OF THESE STOP AND PAGE  ON CALL ATTENDING. IF OTHER POSSIBLY URGENT SYMPTOMS THEN PAGE PHYSICIAN YOU ARE SCHEDULING WITH OR ON CALL IF NO ANSWER.       The patient chose to:                                                                                                                                                                                                                    Consent to a teledermatology visit with [employer] photos. The patient " understood they must upload a [employer] photo for this visit to be completed. They indicated that the photo will be taken at their home address (if other address please document here). Patient told nursing these are already uploaded .  The patient was instructed to take photos of all all areas of concern and all areas of any rash from near and far away.                                                                                                                                                                                                                                                                                                                                                                                                                                      Patient concerns for this return visit:   Chief Complaint   Patient presents with     Derm Problem     Started two weeks ago on left foot - itchy, rough, redness, burning sensation. hx of eczema as a kid. Noticing about a week ago on right foot. Patient used Dotera Essential oils/ointment, which did lessened the redness and calmed the itch. She just started using application two days ago.         Nursing tasks completed  -Pharmacy preference was updated.  -The nurse has dropped in the AVS information *(For adults the phrase is umdermhteleavs and for pediatrics it is their own) for the physician to route in the AVS.                                                                                                                                                                                                                         -The patient was told to contact the clinic if they have not received correspondence within 72 hours.

## 2020-04-21 NOTE — PATIENT INSTRUCTIONS
[facility] Teledermatology Visit    Thank you for allowing us to participate in your care. Your findings, instructions and follow-up plan are as follows:  - apply triamcinolone2 twice daily for 2 weeks    When should I call my doctor?    If you are worsening or not improving, please, contact us or seek urgent care as noted below.     Who should I call with questions (adults)?    [facility] (adult and pediatric): [phone]     [facility] (adult): [phone]    For urgent needs outside of business hours call the [facility] at [phone] and ask for the dermatology resident on call    If this is a medical emergency and you are unable to reach an ER, Call [phone]      Who should I call with questions (pediatric)?  [facility]- Pediatric Dermatology  Dr. Lana Thompson, Dr. Adrianne Houston, Dr. Renée Gutierrez, Urmila Menendeznhclaribel, PA  Dr. Naima Michael, Dr. Elizabeth Arshad & Dr. Norbert Siegel  Non Urgent  Nurse Triage Line; [phone]- Jodi and Asmita RN Care Coordinators   Syeda (/Complex ) [phone]    If you need a prescription refill, please contact your pharmacy. Refills are approved or denied by our Physicians during normal business hours, Monday through Fridays  Per office policy, refills will not be granted if you have not been seen within the past year (or sooner depending on your child's condition)    Scheduling Information:  Pediatric Appointment Scheduling and Call Center [phone]  Radiology Scheduling- [phone]  Sedation Unit Scheduling- [phone]  [place] Scheduling- General [phone]; Pediatric Dermatology [phone]  Main  Services: [phone]  Northern Irish: [phone]  Emirati: [phone]  Hmong/Yon/Jose A: [phone]  Preadmission Nursing Department Fax Number: [phone] (Fax all pre-operative paperwork to this  number)    For urgent matters arising during evenings, weekends, or holidays that cannot wait for normal business hours please call [phone] and ask for the Dermatology Resident On-Call to be paged.

## 2020-04-21 NOTE — PROGRESS NOTES
[facility]  Store and Forward and Telephone [phone]      Impression and Recommendations (Patient Counseled on the Following):    1. Actinic keratosis, left cheek-treated with cryo at last visit    Reports this is resolved     2.1.2cm red patch, left dorsal wrist, - unchanged, consistent with hemangioma.     We will continue to monitor - she repors it is unchanged     3. Dimpling papule- left thigh, unchanged-consistent with DF, unchanged at last visit    We will continue to monitor this site.        4. Vitiligo  No further intervention needed. PT declines treatment     5. Eczematous plaque on the left ankle, now new patch son the dorsal feet, will treat as eczema. KOH negative. Ankle KOH negative in the past. Consider also pigmented purpuric dermatoses    Start triamcinolone 0.1% cream BID for the next two weeks.              Follow-up:   2 weeks with photos and phone for feeet     Staff only:    Desi Rossi MD    Department of Dermatology  [facility]: Phone: [phone], Fax:[phone]  [facility]: Phone: [phone], Fax: [phone]        _____________________________________________________________________________    Dermatology Problem List:  1. Vitiligo - began at age 25    Encounter Date: Apr 21, 2020    CC:   Chief Complaint   Patient presents with     Derm Problem     Started two weeks ago on left foot - itchy, rough, redness, burning sensation. hx of eczema as a kid. Noticing about a week ago on right foot. Patient used Dotera Essential oils/ointment, which did lessened the redness and calmed the itch. She just started using application two days ago.         History of Present Illness:  I have reviewed the teledermatology information and the nursing intake corresponding to this issue. Crys Gama is a 69 year old female who presents via  teledermatology for rash on the foot for 2 weeks. Ha shx of eczema as a child. Reports redness and itch and using essential oils and it is helping.       ROS: Patient is generally feeling well today      Physical Examination:  General: Well-sounding, appropriately-developed individual.  Skin: Focused examination within the teledermatology photograph(s) including feet was performed.   -erythematous patches and macules, dorsal feet, left greater than right  -depigmented patches on the dorsal feet    Labs:  NA    Past Medical History:   Patient Active Problem List   Diagnosis     Hyperlipidemia     HTN (hypertension)     GERD (gastroesophageal reflux disease)     Asthma, mild intermittent     HYPERLIPIDEMIA LDL GOAL <130     Hypertension goal BP (blood pressure) < 140/90     Advanced directives, counseling/discussion     Osteoporosis     Osteoporosis, post-menopausal     Angioma of skin     Past Medical History:   Diagnosis Date     Asthma, mild intermittent      Family history of colon cancer     had colonoscopy in 2008, due in 5 years     GERD (gastroesophageal reflux disease)      HTN (hypertension)      Hyperlipidemia      Osteoporosis      Vitiligo      Past Surgical History:   Procedure Laterality Date     HC REMOVAL GALLBLADDER  4-1999     SURGICAL HISTORY OF -       wrist and finger surgery for fracture       Social History:  Patient reports that she has never smoked. She has never used smokeless tobacco. She reports that she does not drink alcohol or use drugs.    Family History:  Family History   Problem Relation Age of Onset     Asthma Mother      Diabetes Mother      Hypertension Mother      Allergies Mother      Arthritis Mother      Cancer Mother      Eye Disorder Mother      Gastrointestinal Disease Mother      Gynecology Mother      Lipids Mother      Osteoporosis Mother      Respiratory Mother      C.A.D. Father      Diabetes Father      Hypertension Father      Blood Disease Father      Cancer Father       Cardiovascular Father      Circulatory Father      Heart Disease Father      Lipids Father      Diabetes Maternal Grandmother      Heart Disease Maternal Grandmother      Osteoporosis Maternal Grandmother      Thyroid Disease Maternal Grandmother      C.A.D. Maternal Grandfather      Cardiovascular Maternal Grandfather      Cancer Paternal Grandmother      Hypertension Brother      Cardiovascular Brother      Lipids Brother      Hypertension Sister      Cancer Sister      Gastrointestinal Disease Sister      Gynecology Sister      Genitourinary Problems Sister      Lipids Sister      Obesity Sister      Asthma Daughter      Allergies Daughter      Gastrointestinal Disease Daughter      Obesity Daughter      Glaucoma No family hx of      Cerebrovascular Disease Paternal Aunt      Hypertension Brother      Connective Tissue Disorder Brother         pancreatic problem        Medications:  Current Outpatient Medications   Medication     Alendronate Sodium (FOSAMAX PO)     CALCIUM + D OR     cyanocobalamin (VITAMIN B-12) 1000 MCG tablet     Multiple Vitamin (MULTI-DAY PO)     simvastatin (ZOCOR) 10 MG tablet     GLUCOSAMINE CHONDR 1500 COMPLX OR     ketoconazole (NIZORAL) 2 % external cream     KRILL OIL PO     triamcinolone (KENALOG) 0.1 % external cream     No current facility-administered medications for this visit.           Allergies   Allergen Reactions     Azithromycin Hives     Erythromycin Hives     Penicillins Hives     Tetracycline Hives         _____________________________________________________________________________    Teledermatology information:  - Location of patient: Home  - Patient presented as: return  - Location of teledermatologist:  ([facility] )  - Reason teledermatology is appropriate:  of National Emergency Regarding Coronavirus disease (COVID 19) Outbreak  - Image quality and interpretability: acceptable  - Physician has received verbal consent for a Video/Photos Visit  from the patient? Yes  - In-person dermatology visit recommendation: no  - Date of images:4/21/2020  - Service start time:11:54am  - Service end time:12:00pm  - Date of report: 4/21/2020

## 2020-05-04 ENCOUNTER — MYC MEDICAL ADVICE (OUTPATIENT)
Dept: DERMATOLOGY | Facility: CLINIC | Age: 70
End: 2020-05-04

## 2020-05-05 ENCOUNTER — VIRTUAL VISIT (OUTPATIENT)
Dept: DERMATOLOGY | Facility: CLINIC | Age: 70
End: 2020-05-05
Payer: MEDICARE

## 2020-05-05 DIAGNOSIS — L30.9 DERMATITIS: Primary | ICD-10-CM

## 2020-05-05 PROCEDURE — 99207 ZZC NO BILLABLE SERVICE THIS VISIT: CPT | Performed by: DERMATOLOGY

## 2020-05-05 ASSESSMENT — PAIN SCALES - GENERAL: PAINLEVEL: NO PAIN (0)

## 2020-05-05 NOTE — PROGRESS NOTES
[facility]  Store and Forward and [phone]      Impression and Recommendations (Patient Counseled on the Following):       1. Actinic keratosis, left cheek-resolved, not addressed     2.1.2cm red patch, left dorsal wrist, - unchanged, consistent with hemangioma.   -We will continue to monitor - she repors it is unchanged, not addressed today     3. Dimpling papule- left thigh, unchanged-consistent with DF, unchanged at last visit  -We will continue to monitor this site. not addressed today      4. Vitiligo-No further intervention needed. PT declines treatment-not addressed today     5. Eczematous plaque on the left ankle, now new patch son the dorsal feet, will treat as eczema. KOH negative. Ankle KOH negative in the past. Consider also pigmented purpuric dermatoses-resolved  -Triamcinolone 0.1% cream BID for the next 1-2 day, then stop    Follow-up:   July for annual skin exam     Staff only:    Desi Rossi MD    Department of Dermatology  [facility]: Phone: [phone], Fax:[phone]  [facility]: Phone: [phone], Fax: [phone]        _____________________________________________________________________________    Dermatology Problem List:  1. Vitiligo - began at age 25  2. AK  3. Patch, left wrist, monitored  4.Dermatitis, most likely eczema  -s/p triamcinolone 2020       Encounter Date: May 5, 2020    CC:   Chief Complaint   Patient presents with     Derm Problem     Eczematous on feet follow up       History of Present Illness:  I have reviewed the teledermatology information and the nursing intake corresponding to this issue. Crys Gama is a 69 year old female who presents via teledermatology for rash on lower extremity. Was started on triamcinolone at last visit. She feels that it is gone.       ROS: Patient is generally feeling  well today     Physical Examination:  General: Well-sounding appropriately-developed individual.  Skin: Focused examination within the teledermatology photograph(s) including dorsal feet was performed.   - faint pink patch, left dorsal foot,  -right dorsal foot  -depigmented patches dorsal feet    Labs:  NA  Past Medical History:   Patient Active Problem List   Diagnosis     Hyperlipidemia     HTN (hypertension)     GERD (gastroesophageal reflux disease)     Asthma, mild intermittent     HYPERLIPIDEMIA LDL GOAL <130     Hypertension goal BP (blood pressure) < 140/90     Advanced directives, counseling/discussion     Osteoporosis     Osteoporosis, post-menopausal     Angioma of skin     Past Medical History:   Diagnosis Date     Asthma, mild intermittent      Family history of colon cancer     had colonoscopy in 2008, due in 5 years     GERD (gastroesophageal reflux disease)      HTN (hypertension)      Hyperlipidemia      Osteoporosis      Vitiligo      Past Surgical History:   Procedure Laterality Date     HC REMOVAL GALLBLADDER  4-1999     SURGICAL HISTORY OF -       wrist and finger surgery for fracture       Social History:  Patient reports that she has never smoked. She has never used smokeless tobacco. She reports that she does not drink alcohol or use drugs.    Family History:  Family History   Problem Relation Age of Onset     Asthma Mother      Diabetes Mother      Hypertension Mother      Allergies Mother      Arthritis Mother      Cancer Mother      Eye Disorder Mother      Gastrointestinal Disease Mother      Gynecology Mother      Lipids Mother      Osteoporosis Mother      Respiratory Mother      C.A.D. Father      Diabetes Father      Hypertension Father      Blood Disease Father      Cancer Father      Cardiovascular Father      Circulatory Father      Heart Disease Father      Lipids Father      Diabetes Maternal Grandmother      Heart Disease Maternal Grandmother      Osteoporosis Maternal  Grandmother      Thyroid Disease Maternal Grandmother      C.A.D. Maternal Grandfather      Cardiovascular Maternal Grandfather      Cancer Paternal Grandmother      Hypertension Brother      Cardiovascular Brother      Lipids Brother      Hypertension Sister      Cancer Sister      Gastrointestinal Disease Sister      Gynecology Sister      Genitourinary Problems Sister      Lipids Sister      Obesity Sister      Asthma Daughter      Allergies Daughter      Gastrointestinal Disease Daughter      Obesity Daughter      Glaucoma No family hx of      Cerebrovascular Disease Paternal Aunt      Hypertension Brother      Connective Tissue Disorder Brother         pancreatic problem        Medications:  Current Outpatient Medications   Medication     Alendronate Sodium (FOSAMAX PO)     CALCIUM + D OR     cyanocobalamin (VITAMIN B-12) 1000 MCG tablet     Multiple Vitamin (MULTI-DAY PO)     simvastatin (ZOCOR) 10 MG tablet     triamcinolone (KENALOG) 0.1 % external cream     GLUCOSAMINE CHONDR 1500 COMPLX OR     ketoconazole (NIZORAL) 2 % external cream     KRILL OIL PO     triamcinolone (KENALOG) 0.1 % external cream     No current facility-administered medications for this visit.           Allergies   Allergen Reactions     Azithromycin Hives     Erythromycin Hives     Penicillins Hives     Tetracycline Hives         _____________________________________________________________________________    Teledermatology information:  - Location of patient: Home  - Patient presented as: return  - Location of teledermatologist:  ([facility] )  - Reason teledermatology is appropriate:  of National Emergency Regarding Coronavirus disease (COVID 19) Outbreak  - Image quality and interpretability: acceptable  - Physician has received verbal consent for a Video/Photos Visit from the patient? Yes  - In-person dermatology visit recommendation: no  - Date of images: 5/4/2020  - Service start time:9:52am  - Service end  time:9:54am  - Date of report: 5/5/2020

## 2020-05-05 NOTE — PATIENT INSTRUCTIONS
[facility] Teledermatology Visit    Thank you for allowing us to participate in your care. Your findings, instructions and follow-up plan are as follows:  -Triamcinolone 0.1% cream BID for the next 1-2 day, then stop on left foot for resolving rash2  When should I call my doctor?    If you are worsening or not improving, please, contact us or seek urgent care as noted below.     Who should I call with questions (adults)?    [facility] (adult and pediatric): [phone]     [facility] (adult): [phone]    For urgent needs outside of business hours call the [facility] at [phone] and ask for the dermatology resident on call    If this is a medical emergency and you are unable to reach an ER, Call [phone]      Who should I call with questions (pediatric)?  [facility]- Pediatric Dermatology  Dr. Lana Thompson, Dr. Adrianne Houston, Dr. Renée Gutierrez, Urmila Boone, VITOR Michael, Dr. Elizabeth Arshad & Dr. Norbert Siegel  Non Urgent  Nurse Triage Line; [phone]- Jodi and Asmita RN Care Coordinators   Syeda (/Complex ) [phone]    If you need a prescription refill, please contact your pharmacy. Refills are approved or denied by our Physicians during normal business hours, Monday through Fridays  Per office policy, refills will not be granted if you have not been seen within the past year (or sooner depending on your child's condition)    Scheduling Information:  Pediatric Appointment Scheduling and Call Center [phone]  Radiology Scheduling- [phone]  Sedation Unit Scheduling- [phone]  [place] Scheduling- General [phone]; Pediatric Dermatology [phone]  Main  Services: [phone]  Turkmen: [phone]  Russian: [phone]  Hmong/Yon/Jose A: [phone]  Preadmission Nursing Department Fax Number: [phone] (Fax  all pre-operative paperwork to this number)    For urgent matters arising during evenings, weekends, or holidays that cannot wait for normal business hours please call [phone] and ask for the Dermatology Resident On-Call to be paged.

## 2020-05-05 NOTE — NURSING NOTE
"Teledermatology Nurse Call for RETURN patients seen within the last 3 years:    The patient was contacted by phone and we reviewed, \"Due to the coronavirus pandemic, we are calling to review your visit and offer you a teledermatology visit where you send in photos via [email or web address]. These photos will be seen by an MD or CHELI. This will be billed to you and your insurance.\"  The patient was also told that \"a teledermatology visit is not as thorough as an in-person visit and that the quality of the photograph sent may not be of the same quality as that taken by the dermatology clinic, but the patient would like to proceed with an teledermatology because of National Emergency Regarding Coronavirus disease (COVID 19) Outbreak.\"  \"If a prescription is necessary we can send it directly to your pharmacy.  If lab work is needed we can place an order for that and you can then stop by our lab to have the test done at a later time.\"    The patient understood that they may receive a call from the clinic to review additional history, may still be instructed to come to clinic even after photo review and be billed for both visits with an MD. They were told that a photo assessment does not replace an in person skin exam. The patient understood that teledermatology is not for urgent issues and would require up to 3 business days for review. The patient denied skin pain, fever, mucosal symptoms (lesions, blisters, sores in the mouth, nose, eyes, or genitals)  IF PATIENT ENDORSES ANY OF THESE STOP AND PAGE  ON CALL ATTENDING. IF OTHER POSSIBLY URGENT SYMPTOMS THEN PAGE PHYSICIAN YOU ARE SCHEDULING WITH OR ON CALL IF NO ANSWER.       The patient chose to:                                                                                                                                                                                                                    Consent to a teledermatology visit with Conductivhart photos. The patient understood " they must upload a ybuyhart photo for this visit to be completed. They indicated that the photo will be taken at their home address(if other address please document here). Patient told nursing these are already uploaded .  The patient was instructed to take photos of all all areas of concern and all areas of any rash from near and far away.                                                                                                                                                                                                                                                                                                                                                                                                                                      Patient concerns for this return visit:   Chief Complaint   Patient presents with     Derm Problem     Eczematous on feet follow up     Progression of Symptoms:  Patient reported improvement since last visit. She used Triamcinolone for two weeks; last used it yesterday. She noticed improvement in 5 days of using Triamcinolone. She voices that the roughness and red rash appears to be gone.       Nursing tasks completed  -Pharmacy preference was updated.  -The nurse has dropped in the AVS information *(For adults the phrase is umdermhteleavs and for pediatrics it is their own) for the physician to route in the AVS.                                                                                                                                                                                                                         -The patient was told to contact the clinic if they have not received correspondence within 72 hours.

## 2020-11-29 ENCOUNTER — HEALTH MAINTENANCE LETTER (OUTPATIENT)
Age: 70
End: 2020-11-29

## 2021-02-19 ENCOUNTER — OFFICE VISIT (OUTPATIENT)
Dept: DERMATOLOGY | Facility: CLINIC | Age: 71
End: 2021-02-19
Payer: MEDICARE

## 2021-02-19 DIAGNOSIS — L80 VITILIGO: ICD-10-CM

## 2021-02-19 DIAGNOSIS — L57.0 AK (ACTINIC KERATOSIS): ICD-10-CM

## 2021-02-19 DIAGNOSIS — L82.1 SK (SEBORRHEIC KERATOSIS): ICD-10-CM

## 2021-02-19 DIAGNOSIS — L57.0 ACTINIC KERATOSIS: Primary | ICD-10-CM

## 2021-02-19 DIAGNOSIS — L30.9 DERMATITIS: ICD-10-CM

## 2021-02-19 PROCEDURE — 17003 DESTRUCT PREMALG LES 2-14: CPT | Performed by: DERMATOLOGY

## 2021-02-19 PROCEDURE — 17000 DESTRUCT PREMALG LESION: CPT | Performed by: DERMATOLOGY

## 2021-02-19 PROCEDURE — 99213 OFFICE O/P EST LOW 20 MIN: CPT | Mod: 25 | Performed by: DERMATOLOGY

## 2021-02-19 NOTE — PATIENT INSTRUCTIONS
[facility] Dermatology Visit    Thank you for allowing us to participate in your care. Your findings, instructions and follow-up plan are as follows:         When should I call my doctor?    If you are worsening or not improving, please, contact us or seek urgent care as noted below.     Who should I call with questions (adults)?    [facility] (adult and pediatric): [phone]     [facility] (adult): [phone]    For urgent needs outside of business hours call the [facility] at [phone] and ask for the dermatology resident on call    If this is a medical emergency and you are unable to reach an ER, Call 911      Who should I call with questions (pediatric)?  [facility]- Pediatric Dermatology  Dr. Lana Thompson, Dr. Adrianne Houston, Dr. Renée Gutierrez, Urmila Boone, PA  Dr. Naima Michael, Dr. Elizabeth Arshad & Dr. Norbert Siegel  Non Urgent  Nurse Triage Line; [phone]- Jodi and Asmita RN Care Coordinators   Syeda (/Complex ) [phone]    If you need a prescription refill, please contact your pharmacy. Refills are approved or denied by our Physicians during normal business hours, Monday through Fridays  Per office policy, refills will not be granted if you have not been seen within the past year (or sooner depending on your child's condition)    Scheduling Information:  Pediatric Appointment Scheduling and Call Center [phone]  Radiology Scheduling- [phone]  Sedation Unit Scheduling- [phone]  [place] Scheduling- General [phone]; Pediatric Dermatology [phone]  Main  Services: [phone]  Japanese: [phone]  Trinidadian: [phone]  Hmong/Danish/Nepali: [phone]  Preadmission Nursing Department Fax Number: [phone] (Fax all pre-operative paperwork to this number)    For urgent matters arising during evenings,  weekends, or holidays that cannot wait for normal business hours please call [phone] and ask for the Dermatology Resident On-Call to be paged.      Cryotherapy    What is it?    Use of a very cold liquid, such as liquid nitrogen, to freeze and destroy abnormal skin cells that need to be removed    What should I expect?    Tenderness and redness    A small blister that might grow and fill with dark purple blood. There may be crusting.    More than one treatment may be needed if the lesions do not go away.    How do I care for the treated area?    Gently wash the area with your hands when bathing.    Use a thin layer of Vaseline to help with healing. You may use a Band-Aid.     The area should heal within 7-10 days and may leave behind a pink or lighter color.     Do not use an antibiotic or Neosporin ointment.     You may take acetaminophen (Tylenol) for pain.     Call your Doctor if you have:    Severe pain    Signs of infection (warmth, redness, cloudy yellow drainage, and or a bad smell)    Questions or concerns    Who should I call with questions?       [facility]: [phone]       [facility]: [phone]       For urgent needs outside of business hours call the [facility] at [phone]        and ask for the dermatology resident on call

## 2021-02-19 NOTE — PROGRESS NOTES
"[facility] Dermatology Note  Encounter Date: Feb 19, 2021  Office Visit     Dermatology Problem List:  1. Vitiligo - began at age 25  2. AK  -s/p cryo  3. Patch, left wrist, monitored  4. Dermatitis, most likely eczema   -s/p triamcinolone 2020  5. COVID vaccine reaction, left bicep   ____________________________________________    Assessment & Plan:    1. 1.2 cm red patch, left dorsal wrist - unchanged, consistent with hemangioma.   - no change     2. Dimpling papule - left thigh, unchanged-consistent with DF, unchanged at last visit  -We will continue to monitor this site, refuses removal    3. Dermatitis, eczema - left lower leg   - Start triamcinolone cream for 2 weeks and emollient    4. AK, nasal tip and left dorsal hand  - Cryotherapy procedure note: After verbal consent and discussion of risks and benefits including but no limited to dyspigmentation/scar, blister, and pain, 2 was treated with 1-2mm freeze border for 2 cycles with liquid nitrogen. Post cryotherapy instructions were provided.     5. Depigmentation from vitiligo, trunk and extremities  - No intervention required , declines treatment    6. COVID vaccine site reaction, still present 2 weeks after, not painful ,conssitent with delayed hypersensitive  - Photodocumentation today. Monitor for changes   - Phone visit in 2-3 weeks. Call if worsening redness or pain and not resolving  - Reviewed with patient from up to date: \"They might also hamper the immune response, while topical corticosteroids might also be helpful and would be less likely to do so. We have recommended that patients with such reactions receive their second dose in the usual manner on time, but perhaps in the opposite arm, but we have no data regarding second doses as yet\"      Procedures Performed:   NA      Follow-up: 2-3 weeks via phone for COVID vaccine reaction     Staff and Scribe:     Scribe Disclosure  I, Cristal Willis, am serving as a scribe to " document services personally performed by Dr. Desi Rossi MD, based on data collection and the provider's statements to me.     Scribe Disclosure:   I, Butch, am serving as a scribe to document services personally performed , based on data collection and the provider's statements to me.   [id number], MEDICAL ASSISTANT     Provider Disclosure:   The documentation recorded by the scribe accurately reflects the services I personally performed and the decisions made by me.    Desi Rossi MD    Department of Dermatology  [facility]: Phone: [phone], Fax:[phone]  [facility]: Phone: [phone], Fax: [phone]        ____________________________________________    CC: Skin Check (skin check, family hx SC-mom and siblings. Pt also has vitiligo since 25 years old)    HPI:  Ms. Crys Gama is a(n) 70 year old female with a hx of AK who presents today as a return patient for skin check.     Last seen 05/05/2019 when lesions on the left dorsal wrist and left thigh were unchanged since her previous visit. She was also started on TAC 0.1% BID  For an eczematous plaque on the left ankle.     Today, patient reported a rough area on nasal bridge, rechecking the left dorsal wrist and left thigh which are unchanged, a red spot posterior right calf. Vitiligo is regressing. Eczematous plaque on the left ankle has resolved. Patient reported she had her first Moderna COVID vaccine on 2/9; redness and swelling at the site.     Patient also reports that she was hiking a few months ago and developed an eczematous patch on the left calf.     Patient is otherwise feeling well, without additional concerns. No changes in medical problems. Nothing bleeding, crusting, or changing.       Labs:  NA    Physical Exam:  Vitals: There were no vitals taken for this visit.  SKIN: Total skin excluding the undergarment  areas was performed. The exam included the head/face, neck, both arms, chest, back, abdomen, both legs, digits and/or nails.   - Erythematous scaly patches on the left lower leg  - There is an erythematous macules with overlying adherent scale on the nasal tip.   - Depigmentation from vitiligo, trunk and extremities   - Mild erythema on the left bicep at the site of COVID vaccination   - There are tan to brown waxy, stuck on papules located on the trunk and extremities.   - No other lesions of concern on areas examined.     Medications:  Current Outpatient Medications   Medication     CALCIUM + D OR     cyanocobalamin (VITAMIN B-12) 1000 MCG tablet     Multiple Vitamin (MULTI-DAY PO)     simvastatin (ZOCOR) 10 MG tablet     triamcinolone (KENALOG) 0.1 % external cream     Alendronate Sodium (FOSAMAX PO)     GLUCOSAMINE CHONDR 1500 COMPLX OR     ketoconazole (NIZORAL) 2 % external cream     KRILL OIL PO     triamcinolone (KENALOG) 0.1 % external cream     No current facility-administered medications for this visit.       Past Medical History:   Patient Active Problem List   Diagnosis     Hyperlipidemia     HTN (hypertension)     GERD (gastroesophageal reflux disease)     Asthma, mild intermittent     HYPERLIPIDEMIA LDL GOAL <130     Hypertension goal BP (blood pressure) < 140/90     Advanced directives, counseling/discussion     Osteoporosis     Osteoporosis, post-menopausal     Angioma of skin     Past Medical History:   Diagnosis Date     Asthma, mild intermittent      Family history of colon cancer     had colonoscopy in 2008, due in 5 years     GERD (gastroesophageal reflux disease)      HTN (hypertension)      Hyperlipidemia      Osteoporosis      Vitiligo         CC No referring provider defined for this encounter. on close of this encounter.

## 2021-02-19 NOTE — LETTER
"    2/19/2021         RE: Crys Gama  [address] [place] [address]        Dear Colleague,    Thank you for referring your patient, Crys Gama, to the [facility]. Please see a copy of my visit note below.    [facility] Dermatology Note  Encounter Date: Feb 19, 2021  Office Visit     Dermatology Problem List:  1. Vitiligo - began at age 25  2. AK  -s/p cryo  3. Patch, left wrist, monitored  4. Dermatitis, most likely eczema   -s/p triamcinolone 2020  5. COVID vaccine reaction, left bicep   ____________________________________________    Assessment & Plan:    1. 1.2 cm red patch, left dorsal wrist - unchanged, consistent with hemangioma.   - no change     2. Dimpling papule - left thigh, unchanged-consistent with DF, unchanged at last visit  -We will continue to monitor this site, refuses removal    3. Dermatitis, eczema - left lower leg   - Start triamcinolone cream for 2 weeks and emollient    4. AK, nasal tip and left dorsal hand  - Cryotherapy procedure note: After verbal consent and discussion of risks and benefits including but no limited to dyspigmentation/scar, blister, and pain, 2 was treated with 1-2mm freeze border for 2 cycles with liquid nitrogen. Post cryotherapy instructions were provided.     5. Depigmentation from vitiligo, trunk and extremities  - No intervention required , declines treatment    6. COVID vaccine site reaction, still present 2 weeks after, not painful ,conssitent with delayed hypersensitive  - Photodocumentation today. Monitor for changes   - Phone visit in 2-3 weeks. Call if worsening redness or pain and not resolving  - Reviewed with patient from up to date: \"They might also hamper the immune response, while topical corticosteroids might also be helpful and would be less likely to do so. We have recommended that patients with such reactions receive their second dose in the usual manner on time, but perhaps in the " "opposite arm, but we have no data regarding second doses as yet\"      Procedures Performed:   NA      Follow-up: 2-3 weeks via phone for COVID vaccine reaction     Staff and Scribe:     Scribe Disclosure  I, Cristal Willis, am serving as a scribe to document services personally performed by Dr. Desi Rossi MD, based on data collection and the provider's statements to me.     Scribe Disclosure:   I, Butch, am serving as a scribe to document services personally performed , based on data collection and the provider's statements to me.   [id number], MEDICAL ASSISTANT     Provider Disclosure:   The documentation recorded by the scribe accurately reflects the services I personally performed and the decisions made by me.    Desi Rossi MD    Department of Dermatology  [facility]: Phone: [phone], Fax:[phone]  [facility]: Phone: [phone], Fax: [phone]        ____________________________________________    CC: Skin Check (skin check, family hx SC-mom and siblings. Pt also has vitiligo since 25 years old)    HPI:  Ms. Crys Gama is a(n) 70 year old female with a hx of AK who presents today as a return patient for skin check.     Last seen 05/05/2019 when lesions on the left dorsal wrist and left thigh were unchanged since her previous visit. She was also started on TAC 0.1% BID  For an eczematous plaque on the left ankle.     Today, patient reported a rough area on nasal bridge, rechecking the left dorsal wrist and left thigh which are unchanged, a red spot posterior right calf. Vitiligo is regressing. Eczematous plaque on the left ankle has resolved. Patient reported she had her first Moderna COVID vaccine on 2/9; redness and swelling at the site.     Patient also reports that she was hiking a few months ago and developed an eczematous patch on the left calf.     Patient is " otherwise feeling well, without additional concerns. No changes in medical problems. Nothing bleeding, crusting, or changing.       Labs:  NA    Physical Exam:  Vitals: There were no vitals taken for this visit.  SKIN: Total skin excluding the undergarment areas was performed. The exam included the head/face, neck, both arms, chest, back, abdomen, both legs, digits and/or nails.   - Erythematous scaly patches on the left lower leg  - There is an erythematous macules with overlying adherent scale on the nasal tip.   - Depigmentation from vitiligo, trunk and extremities   - Mild erythema on the left bicep at the site of COVID vaccination   - There are tan to brown waxy, stuck on papules located on the trunk and extremities.   - No other lesions of concern on areas examined.     Medications:  Current Outpatient Medications   Medication     CALCIUM + D OR     cyanocobalamin (VITAMIN B-12) 1000 MCG tablet     Multiple Vitamin (MULTI-DAY PO)     simvastatin (ZOCOR) 10 MG tablet     triamcinolone (KENALOG) 0.1 % external cream     Alendronate Sodium (FOSAMAX PO)     GLUCOSAMINE CHONDR 1500 COMPLX OR     ketoconazole (NIZORAL) 2 % external cream     KRILL OIL PO     triamcinolone (KENALOG) 0.1 % external cream     No current facility-administered medications for this visit.       Past Medical History:   Patient Active Problem List   Diagnosis     Hyperlipidemia     HTN (hypertension)     GERD (gastroesophageal reflux disease)     Asthma, mild intermittent     HYPERLIPIDEMIA LDL GOAL <130     Hypertension goal BP (blood pressure) < 140/90     Advanced directives, counseling/discussion     Osteoporosis     Osteoporosis, post-menopausal     Angioma of skin     Past Medical History:   Diagnosis Date     Asthma, mild intermittent      Family history of colon cancer     had colonoscopy in 2008, due in 5 years     GERD (gastroesophageal reflux disease)      HTN (hypertension)      Hyperlipidemia      Osteoporosis      Vitiligo          CC No referring provider defined for this encounter. on close of this encounter.      Again, thank you for allowing me to participate in the care of your patient.        Sincerely,        Desi Rossi MD

## 2021-02-19 NOTE — NURSING NOTE
Crys Gama's goals for this visit include:   Chief Complaint   Patient presents with     Skin Check     skin check, family hx SC-mom and siblings. Pt also has vitiligo since 25 years old       She requests these members of her care team be copied on today's visit information:     PCP: Daniel Vargas    Referring Provider:  No referring provider defined for this encounter.    There were no vitals taken for this visit.    Do you need any medication refills at today's visit? No    Suzette Heath LPN

## 2021-04-10 ENCOUNTER — HEALTH MAINTENANCE LETTER (OUTPATIENT)
Age: 71
End: 2021-04-10

## 2021-09-19 ENCOUNTER — HEALTH MAINTENANCE LETTER (OUTPATIENT)
Age: 71
End: 2021-09-19

## 2022-04-08 ENCOUNTER — OFFICE VISIT (OUTPATIENT)
Dept: DERMATOLOGY | Facility: CLINIC | Age: 72
End: 2022-04-08
Payer: MEDICARE

## 2022-04-08 ENCOUNTER — ALLIED HEALTH/NURSE VISIT (OUTPATIENT)
Dept: DERMATOLOGY | Facility: CLINIC | Age: 72
End: 2022-04-08
Payer: MEDICARE

## 2022-04-08 DIAGNOSIS — L30.9 DERMATITIS: ICD-10-CM

## 2022-04-08 DIAGNOSIS — D48.5 NEOPLASM OF UNCERTAIN BEHAVIOR OF SKIN: Primary | ICD-10-CM

## 2022-04-08 PROCEDURE — 87206 SMEAR FLUORESCENT/ACID STAI: CPT | Mod: 90 | Performed by: DERMATOLOGY

## 2022-04-08 PROCEDURE — 99207 PR NO CHARGE NURSE ONLY: CPT | Performed by: DERMATOLOGY

## 2022-04-08 PROCEDURE — 87176 TISSUE HOMOGENIZATION CULTR: CPT | Performed by: DERMATOLOGY

## 2022-04-08 PROCEDURE — 87070 CULTURE OTHR SPECIMN AEROBIC: CPT | Performed by: DERMATOLOGY

## 2022-04-08 PROCEDURE — 87116 MYCOBACTERIA CULTURE: CPT | Mod: 90 | Performed by: DERMATOLOGY

## 2022-04-08 PROCEDURE — 88305 TISSUE EXAM BY PATHOLOGIST: CPT | Performed by: PATHOLOGY

## 2022-04-08 PROCEDURE — 99213 OFFICE O/P EST LOW 20 MIN: CPT | Mod: 25 | Performed by: DERMATOLOGY

## 2022-04-08 PROCEDURE — 99000 SPECIMEN HANDLING OFFICE-LAB: CPT | Performed by: DERMATOLOGY

## 2022-04-08 PROCEDURE — 87077 CULTURE AEROBIC IDENTIFY: CPT | Performed by: DERMATOLOGY

## 2022-04-08 PROCEDURE — 11102 TANGNTL BX SKIN SINGLE LES: CPT | Performed by: DERMATOLOGY

## 2022-04-08 PROCEDURE — 87102 FUNGUS ISOLATION CULTURE: CPT | Performed by: DERMATOLOGY

## 2022-04-08 RX ORDER — TRIAMCINOLONE ACETONIDE 1 MG/G
CREAM TOPICAL
Qty: 15 G | Refills: 0 | Status: SHIPPED | OUTPATIENT
Start: 2022-04-08 | End: 2023-08-16

## 2022-04-08 NOTE — NURSING NOTE
The following medication was given:     MEDICATION:  Lidocaine with epinephrine 1% [id number]  ROUTE: SQ  SITE: see procedure note  DOSE: see procedure note   LOT #: -[id number]  : Rene  EXPIRATION DATE: 6/1/22  NDC#: 0372-3489-00  Was there drug waste?   Multi-dose vial: Yes            Miya Scruggs on 4/8/2022 at 2:57 PM

## 2022-04-08 NOTE — LETTER
4/8/2022         RE: Crys Gama  [address] [place] [address]        Dear Colleague,    Thank you for referring your patient, Crys Gama, to the [facility]. Please see a copy of my visit note below.    [facility] Dermatology Note  Encounter Date: Apr 8, 2022  Office Visit     Dermatology Problem List:  0. NUB - left dorsal hand - bx 4/8/22  1. Vitiligo - began at age 25  2. AK  -s/p cryo  3. Patch, left wrist, monitored  4. Dermatitis, most likely eczema   -s/p triamcinolone 2020  5. COVID vaccine reaction, left bicep     ____________________________________________    Assessment & Plan:    # Neoplasm of uncertain behavior on the left dorsal hand. The differential diagnosis includes KA vs other. Consider mycobacteria   - See procedure note.    # 1.2 cm red patch, left dorsal wrist - unchanged, consistent with hemangioma.   - Unchanged.    # dermatitis, wrist   - Apply triamcinolone BID for up to two weeks to affected area     # Dimpling papule - left thigh- not seen today   - Resolved       Procedures Performed:   - Shave biopsy procedure note, location(s): left dorsal hand. After discussion of benefits and risks including but not limited to bleeding, infection, scar, incomplete removal, recurrence, and non-diagnostic biopsy, written consent and photographs were obtained. 0.5mL of 1% lidocaine with epinephrine was injected to obtain adequate anesthesia of lesion(s). The area was prepped with iodine after numbing. Shave biopsy at site(s) performed. Hemostasis was achieved with aluminium chloride. Petrolatum ointment and a sterile dressing were applied. The patient tolerated the procedure and no complications were noted. The patient was provided with verbal and written post care instructions.       Follow-up: pending path results,     Staff and Scribe:     Scribe Disclosure:   Aaron YING, am serving as a scribe to document services  personally performed by this physician, Dr. Desi Rossi, based on data collection and the provider's statements to me.     Provider Disclosure:   The documentation recorded by the scribe accurately reflects the services I personally performed and the decisions made by me.    Desi Rossi MD    Department of Dermatology  [facility]: Phone: [phone], Fax:[phone]  [facility]: Phone: [phone], Fax: [phone]      ____________________________________________    CC: Skin Check (FBSE. Area of concern-cat scratch right hand. No personal hx of SC. )    HPI:  Ms. Crys Gama is a(n) 71 year old female who presents today as a return patient for a skin check.    Last seen 2/19/21. No change noted to lesion on the left dorsal wrist. Left thigh site continued to monitor. Started on TAC x 2 weeks for dermatitis. 2 AKs treated with cryo.    Today, she has an area of concern on the right hand. Her cat bit her two months ago.    Patient is otherwise feeling well, without additional skin concerns.    Labs Reviewed:  N/A    Physical Exam:  Vitals: There were no vitals taken for this visit.  SKIN: Total skin excluding the undergarment areas was performed. The exam included the head/face, neck, both arms, chest, back, abdomen, both legs, digits and/or nails.   - Verrucous plaque, 1 cm, with central cutaneous horn on the left dorsal hand  - Left dorsal wrist: red patch  - Faint erythematous macules on the right ventral wrist.    - No other lesions of concern on areas examined.     Medications:  Current Outpatient Medications   Medication     Alendronate Sodium (FOSAMAX PO)     CALCIUM + D OR     cyanocobalamin (VITAMIN B-12) 1000 MCG tablet     GLUCOSAMINE CHONDR 1500 COMPLX OR     ketoconazole (NIZORAL) 2 % external cream     KRILL OIL PO     Multiple Vitamin (MULTI-DAY PO)     simvastatin  (ZOCOR) 10 MG tablet     triamcinolone (KENALOG) 0.1 % external cream     triamcinolone (KENALOG) 0.1 % external cream     No current facility-administered medications for this visit.      Past Medical History:   Patient Active Problem List   Diagnosis     Hyperlipidemia     HTN (hypertension)     GERD (gastroesophageal reflux disease)     Asthma, mild intermittent     HYPERLIPIDEMIA LDL GOAL <130     Hypertension goal BP (blood pressure) < 140/90     Advanced directives, counseling/discussion     Osteoporosis     Osteoporosis, post-menopausal     Angioma of skin     Past Medical History:   Diagnosis Date     Asthma, mild intermittent      Family history of colon cancer     had colonoscopy in 2008, due in 5 years     GERD (gastroesophageal reflux disease)      HTN (hypertension)      Hyperlipidemia      Osteoporosis      Vitiligo         CC Referred Self, MD  No address on file on close of this encounter.      Again, thank you for allowing me to participate in the care of your patient.        Sincerely,        Desi Rossi MD

## 2022-04-08 NOTE — PATIENT INSTRUCTIONS

## 2022-04-08 NOTE — PROGRESS NOTES
[facility] Dermatology Note  Encounter Date: Apr 8, 2022  Office Visit     Dermatology Problem List:  0. NUB - left dorsal hand - bx 4/8/22  1. Vitiligo - began at age 25  2. AK  -s/p cryo  3. Patch, left wrist, monitored  4. Dermatitis, most likely eczema   -s/p triamcinolone 2020  5. COVID vaccine reaction, left bicep     ____________________________________________    Assessment & Plan:    # Neoplasm of uncertain behavior on the left dorsal hand. The differential diagnosis includes KA vs other. Consider mycobacteria   - See procedure note.    # 1.2 cm red patch, left dorsal wrist - unchanged, consistent with hemangioma.   - Unchanged.    # dermatitis, wrist   - Apply triamcinolone BID for up to two weeks to affected area     # Dimpling papule - left thigh- not seen today   - Resolved       Procedures Performed:   - Shave biopsy procedure note, location(s): left dorsal hand. After discussion of benefits and risks including but not limited to bleeding, infection, scar, incomplete removal, recurrence, and non-diagnostic biopsy, written consent and photographs were obtained. 0.5mL of 1% lidocaine with epinephrine was injected to obtain adequate anesthesia of lesion(s). The area was prepped with iodine after numbing. Shave biopsy at site(s) performed. Hemostasis was achieved with aluminium chloride. Petrolatum ointment and a sterile dressing were applied. The patient tolerated the procedure and no complications were noted. The patient was provided with verbal and written post care instructions.       Follow-up: pending path results,     Staff and Scribe:     Scribe Disclosure:   I, Aaron Kong, am serving as a scribe to document services personally performed by this physician, Dr. Desi Rossi, based on data collection and the provider's statements to me.     Provider Disclosure:   The documentation recorded by the scribe accurately reflects the services I personally performed and the  decisions made by me.    Desi Rossi MD    Department of Dermatology  [facility]: Phone: [phone], Fax:[phone]  [facility]: Phone: [phone], Fax: [phone]      ____________________________________________    CC: Skin Check (FBSE. Area of concern-cat scratch right hand. No personal hx of SC. )    HPI:  Ms. Crys Gama is a(n) 71 year old female who presents today as a return patient for a skin check.    Last seen 2/19/21. No change noted to lesion on the left dorsal wrist. Left thigh site continued to monitor. Started on TAC x 2 weeks for dermatitis. 2 AKs treated with cryo.    Today, she has an area of concern on the right hand. Her cat bit her two months ago.    Patient is otherwise feeling well, without additional skin concerns.    Labs Reviewed:  N/A    Physical Exam:  Vitals: There were no vitals taken for this visit.  SKIN: Total skin excluding the undergarment areas was performed. The exam included the head/face, neck, both arms, chest, back, abdomen, both legs, digits and/or nails.   - Verrucous plaque, 1 cm, with central cutaneous horn on the left dorsal hand  - Left dorsal wrist: red patch  - Faint erythematous macules on the right ventral wrist.    - No other lesions of concern on areas examined.     Medications:  Current Outpatient Medications   Medication     Alendronate Sodium (FOSAMAX PO)     CALCIUM + D OR     cyanocobalamin (VITAMIN B-12) 1000 MCG tablet     GLUCOSAMINE CHONDR 1500 COMPLX OR     ketoconazole (NIZORAL) 2 % external cream     KRILL OIL PO     Multiple Vitamin (MULTI-DAY PO)     simvastatin (ZOCOR) 10 MG tablet     triamcinolone (KENALOG) 0.1 % external cream     triamcinolone (KENALOG) 0.1 % external cream     No current facility-administered medications for this visit.      Past Medical History:   Patient Active Problem List   Diagnosis      Hyperlipidemia     HTN (hypertension)     GERD (gastroesophageal reflux disease)     Asthma, mild intermittent     HYPERLIPIDEMIA LDL GOAL <130     Hypertension goal BP (blood pressure) < 140/90     Advanced directives, counseling/discussion     Osteoporosis     Osteoporosis, post-menopausal     Angioma of skin     Past Medical History:   Diagnosis Date     Asthma, mild intermittent      Family history of colon cancer     had colonoscopy in 2008, due in 5 years     GERD (gastroesophageal reflux disease)      HTN (hypertension)      Hyperlipidemia      Osteoporosis      Vitiligo         CC Referred Self, MD  No address on file on close of this encounter.

## 2022-04-08 NOTE — NURSING NOTE
Crys Gama's goals for this visit include:   Chief Complaint   Patient presents with     Skin Check     FBSE. Area of concern-cat scratch right hand. No personal hx of SC.        She requests these members of her care team be copied on today's visit information:     PCP: Daniel Vargsa    Referring Provider:  Referred Self, MD  No address on file    There were no vitals taken for this visit.    Do you need any medication refills at today's visit? Deisy Scruggs on 4/8/2022 at 1:33 PM

## 2022-04-08 NOTE — NURSING NOTE
Crys Gama comes into clinic today at the request of Dr. Rossi Ordering Provider for photos of left dorsal hand for biopsy.        This service provided today was under the supervising provider of the day Dr. Rossi, who was available if needed.            Miya Scruggs on 4/8/2022 at 2:42 PM

## 2022-04-11 ENCOUNTER — TELEPHONE (OUTPATIENT)
Dept: DERMATOLOGY | Facility: CLINIC | Age: 72
End: 2022-04-11
Payer: MEDICARE

## 2022-04-11 NOTE — TELEPHONE ENCOUNTER
Rachel Spangler CMA   4/11/2022  9:08 AM CDT Back to Top        Patient returned call to clinic.  Relayed normal results.  She has no further questions or concerns.    Miya Scruggs   4/11/2022  9:00 AM CDT         LM for patient to return call to discuss results.            Miya Scruggs on 4/11/2022 at 8:59 AM    Desi Rossi MD   4/10/2022  3:20 PM CDT         Normal no infection

## 2022-04-12 LAB
BACTERIA TISS BX CULT: ABNORMAL
PATH REPORT.COMMENTS IMP SPEC: NORMAL
PATH REPORT.COMMENTS IMP SPEC: NORMAL
PATH REPORT.FINAL DX SPEC: NORMAL
PATH REPORT.GROSS SPEC: NORMAL
PATH REPORT.MICROSCOPIC SPEC OTHER STN: NORMAL
PATH REPORT.RELEVANT HX SPEC: NORMAL

## 2022-04-14 ENCOUNTER — TELEPHONE (OUTPATIENT)
Dept: DERMATOLOGY | Facility: CLINIC | Age: 72
End: 2022-04-14
Payer: MEDICARE

## 2022-04-14 NOTE — TELEPHONE ENCOUNTER
[facility] [place] Pre-Surgery Screening Note     Pre-screening Information:  Hx of Skin Cancer: No  Hx of Mohs Surgery: No  Transplant: No  Immunocompromised: No  Current Anticoagulant(s): None  Bleeding Disorder(s): No  Stent: No  Pacemaker: No  Defibrillator: No  Brain/Nerve Stimulator: No  Endocarditis/Rheumatic Fever Hx: No  Vascular graft: No  Prophylactic Antibiotic Needed: No  Congenital heart defect: No  Prosthetic Heart Valve: No  Lesion on Leg/Groin: No  Prosthetic Joint : No  Diabetic: No  HIV/AIDS: No  Hepatitis: No  Pregnant: No  Prior Problem with Local Anesthesia: No  Current Tobacco Use: No  Current Alcohol Use: No  Occupation: retired   needed?: No  Do you have mobility issues?: No  Do you use any assistive devices/DME?: No  Do you have any issues with lying for long periods of time?: No      Medications/Allergies  Medications and allergies review with patient: yes     Current Outpatient Medications   Medication Sig Dispense Refill     CALCIUM + D OR 1500/800 daily       cyanocobalamin (VITAMIN B-12) 1000 MCG tablet Take 1,000 mcg by mouth daily       Multiple Vitamin (MULTI-DAY PO) Take 1 tablet by mouth daily.       simvastatin (ZOCOR) 10 MG tablet Take 10 mg by mouth At Bedtime       triamcinolone (KENALOG) 0.1 % external cream On the spot on the foot, apply twice daily for 2 weeks then stop 15 g 0     triamcinolone (KENALOG) 0.1 % external cream Apply della daily for 2 weeks in a row 60 g 0     Allergies   Allergen Reactions     Azithromycin Hives     Erythromycin Hives     Pcn [Penicillins] Hives     Tetracycline Hives       Pertinent Labs:  Last INR: No results found for: INR    Other Reminders:    Reminded patient to take any order prophylactic antibiotics 1 hour prior to appointment:      Please be aware that this can be an all day procedure. Please bring your daily medications and food/cash. Encourage patient to eat prior to procedure(s). After  care instructions were reviewed with patient.    If any positives, send to RN to initiate antibiotic prophylaxis protocol and/or anticoagulation management protocol      Beth Jones LPN

## 2022-04-14 NOTE — TELEPHONE ENCOUNTER
" Result Notes    Component  Resulting Agency   Case Report   Surgical Pathology Report                         Case: [id number]                                   Authorizing Provider:  Desi Rossi MD           Collected:           04/08/2022 01:50 PM           Ordering Location:     [facility]   Received:            04/08/2022 03:52 PM                                  [place]                                                                   Pathologist:           Chadwick An MD                                                       Specimen:    Skin, Left dorsal hand                                                                     Final Diagnosis   A(1). Skin, Left dorsal hand, shave:  - Squamous cell carcinoma, keratoacanthoma type - (see description)       Electronically signed by Chadwick An MD on 4/12/2022 at 11:40 AM   Clinical Information  [facility]   The patient is a 71 year old female   Gross Description  [facility]   A(1). Skin, Left dorsal hand:  The specimen is received in formalin with proper patient identification, labeled \"left dorsal hand\".  The specimen consists of a 1.1 x 1.0 cm skin shave specimen containing a 0.7 x 0.7 x 0.4 cm tan-yellow, raised, scabbed lesion.  The resection margin is inked blue and the specimen is sectioned and submitted in A1.                  Microscopic Description  [facility]   The specimen exhibits a crateriform proliferation of mildly atypical squamous epithelium with a pale-staining, glassy appearance, and a patchy lichenoid lymphocytic infiltrate. The lesion extends to the deep margin.   Performing Labs  [facility]   The technical component of this testing was completed at [facility]              Specimen Collected: 04/08/22  1:50 PM Last Resulted: 04/12/22 11:40 AM        Order Details      View Encounter      Lab and Collection Details      Routing      Result History         "     Scans on Order [id number]    Document on 4/12/2022 11:40 AM by Chadwick An MD         Result Care Coordination        Result Notes     Beth Jones LPN   4/14/2022  2:13 PM CDT Back to Top        Called and informed patient of results. MOHS procedure and aftercare reviewed with patient. MOHS consult scheduled for 4/20/2022 and MOHS scheduled for 5/11/2022.     Beth Jones LPN

## 2022-04-20 ENCOUNTER — VIRTUAL VISIT (OUTPATIENT)
Dept: DERMATOLOGY | Facility: CLINIC | Age: 72
End: 2022-04-20
Payer: MEDICARE

## 2022-04-20 DIAGNOSIS — C44.629 SQUAMOUS CELL CARCINOMA OF DORSUM OF LEFT HAND: Primary | ICD-10-CM

## 2022-04-20 PROCEDURE — 99442 PR PHYSICIAN TELEPHONE EVALUATION 11-20 MIN: CPT | Mod: 95 | Performed by: DERMATOLOGY

## 2022-04-20 RX ORDER — VITAMIN B COMPLEX
25 TABLET ORAL DAILY
COMMUNITY
End: 2024-09-10

## 2022-04-20 NOTE — LETTER
4/20/2022         RE: Crys Gama  [address] [place] [address]        Dear Colleague,    Thank you for referring your patient, Crys Gama, to the [facility]. Please see a copy of my visit note below.    [facility] Dermatology Note  Encounter Date: Apr 20, 2022  Store-and-Forward and Telephone ([phone]). Location of teledermatologist: [facility].  Start time: 0925. End time: 0936.    Dermatology Problem List:  1. SCC, L dorsal hand Mohs scheduled 5/11/22  2. Vitiligo - began at age 25  3. AK  -s/p cryo  4. Patch, left wrist, monitored  5. Dermatitis, most likely eczema   -s/p triamcinolone 2020  6. COVID vaccine reaction, left bicep     ____________________________________________    Assessment & Plan:     # SCC, L dorsal hand.  The nature, risks, benefits, and alternatives to Mohs surgery were discussed. The patient would like to proceed with Mohs surgery.  No indication for preop antibiotics.   She does not take anticoagulants.     She does some volunteering with seniors.  She will be able to avoid strenuous physical activity and gripping with the left hand following surgery.      Follow-up: Mohs scheduled 5/11/22    Staff:     Av Morales DO    Department of Dermatology  [facility]: Phone: [phone], Fax:[phone]  [facility]: Phone: [phone], Fax: [phone]      ____________________________________________    CC: Consult For (Mohs - SCC)    HPI:  Ms. Crys Gama is a(n) 71 year old female who presents today as a return patient for Mohs surgery consultation to treat squamous cell carcinoma of the left dorsal hand.  She reports the spot has become less painful after the biopsy.  There is less of a bump.  She has some questions about the difference between basal cell  carcinoma and squamous cell carcinoma.     Patient is otherwise feeling well, without additional skin concerns.    Labs Reviewed:  Derm path report 4/8/2022 reviewed  -Left dorsal hand, squamous cell carcinoma, keratoacanthoma type.    Physical Exam:  Vitals: There were no vitals taken for this visit.  SKIN: Teledermatology photos were reviewed; image quality and interpretability: acceptable. Image date: 4/8/2022.  -Skin colored papule with superficial erosion and hemorrhagic crust on the left dorsal hand.  Approximately 1.2 cm in diameter.   - No other lesions of concern on areas examined.     Medications:  Current Outpatient Medications   Medication     CALCIUM + D OR     Cholecalciferol (VITAMIN D) 50 MCG (2000 UT) CAPS     cyanocobalamin (VITAMIN B-12) 1000 MCG tablet     Multiple Vitamin (MULTI-DAY PO)     simvastatin (ZOCOR) 10 MG tablet     triamcinolone (KENALOG) 0.1 % external cream     No current facility-administered medications for this visit.      Past Medical/Surgical History:   Patient Active Problem List   Diagnosis     Hyperlipidemia     HTN (hypertension)     GERD (gastroesophageal reflux disease)     Asthma, mild intermittent     HYPERLIPIDEMIA LDL GOAL <130     Hypertension goal BP (blood pressure) < 140/90     Advanced directives, counseling/discussion     Osteoporosis     Osteoporosis, post-menopausal     Angioma of skin     Past Medical History:   Diagnosis Date     Asthma, mild intermittent      Family history of colon cancer     had colonoscopy in 2008, due in 5 years     GERD (gastroesophageal reflux disease)      HTN (hypertension)      Hyperlipidemia      Osteoporosis      Vitiligo        CC No referring provider defined for this encounter. on close of this encounter.    Teledermatology Nurse Call Patients:       Are you in the [facility] at the time of the encounter? yes    Today's visit will be billed to you and your insurance.    A teledermatology visit is not as thorough as an  in-person visit and the quality of the photograph sent may not be of the same quality as that taken by the dermatology clinic.          Again, thank you for allowing me to participate in the care of your patient.        Sincerely,        Av Morales MD

## 2022-04-20 NOTE — PROGRESS NOTES
[facility] Dermatology Note  Encounter Date: Apr 20, 2022  Store-and-Forward and Telephone ([phone]). Location of teledermatologist: [facility].  Start time: 0925. End time: 0936.    Dermatology Problem List:  1. SCC, L dorsal hand Mohs scheduled 5/11/22  2. Vitiligo - began at age 25  3. AK  -s/p cryo  4. Patch, left wrist, monitored  5. Dermatitis, most likely eczema   -s/p triamcinolone 2020  6. COVID vaccine reaction, left bicep     ____________________________________________    Assessment & Plan:     # SCC, L dorsal hand.  The nature, risks, benefits, and alternatives to Mohs surgery were discussed. The patient would like to proceed with Mohs surgery.  No indication for preop antibiotics.   She does not take anticoagulants.     She does some volunteering with seniors.  She will be able to avoid strenuous physical activity and gripping with the left hand following surgery.      Follow-up: Mohs scheduled 5/11/22    Staff:     Av Morales DO    Department of Dermatology  [facility]: Phone: [phone], Fax:[phone]  [facility]: Phone: [phone], Fax: [phone]      ____________________________________________    CC: Consult For (Mohs - SCC)    HPI:  Ms. Crys Gama is a(n) 71 year old female who presents today as a return patient for Mohs surgery consultation to treat squamous cell carcinoma of the left dorsal hand.  She reports the spot has become less painful after the biopsy.  There is less of a bump.  She has some questions about the difference between basal cell carcinoma and squamous cell carcinoma.     Patient is otherwise feeling well, without additional skin concerns.    Labs Reviewed:  Derm path report 4/8/2022 reviewed  -Left dorsal hand, squamous cell carcinoma, keratoacanthoma type.    Physical Exam:  Vitals:  There were no vitals taken for this visit.  SKIN: Teledermatology photos were reviewed; image quality and interpretability: acceptable. Image date: 4/8/2022.  -Skin colored papule with superficial erosion and hemorrhagic crust on the left dorsal hand.  Approximately 1.2 cm in diameter.   - No other lesions of concern on areas examined.     Medications:  Current Outpatient Medications   Medication     CALCIUM + D OR     Cholecalciferol (VITAMIN D) 50 MCG (2000 UT) CAPS     cyanocobalamin (VITAMIN B-12) 1000 MCG tablet     Multiple Vitamin (MULTI-DAY PO)     simvastatin (ZOCOR) 10 MG tablet     triamcinolone (KENALOG) 0.1 % external cream     No current facility-administered medications for this visit.      Past Medical/Surgical History:   Patient Active Problem List   Diagnosis     Hyperlipidemia     HTN (hypertension)     GERD (gastroesophageal reflux disease)     Asthma, mild intermittent     HYPERLIPIDEMIA LDL GOAL <130     Hypertension goal BP (blood pressure) < 140/90     Advanced directives, counseling/discussion     Osteoporosis     Osteoporosis, post-menopausal     Angioma of skin     Past Medical History:   Diagnosis Date     Asthma, mild intermittent      Family history of colon cancer     had colonoscopy in 2008, due in 5 years     GERD (gastroesophageal reflux disease)      HTN (hypertension)      Hyperlipidemia      Osteoporosis      Vitiligo        CC No referring provider defined for this encounter. on close of this encounter.    Teledermatology Nurse Call Patients:       Are you in the [facility] at the time of the encounter? yes    Today's visit will be billed to you and your insurance.    A teledermatology visit is not as thorough as an in-person visit and the quality of the photograph sent may not be of the same quality as that taken by the dermatology clinic.

## 2022-05-01 ENCOUNTER — HEALTH MAINTENANCE LETTER (OUTPATIENT)
Age: 72
End: 2022-05-01

## 2022-05-06 LAB — BACTERIA TISS BX CULT: NO GROWTH

## 2022-05-11 ENCOUNTER — OFFICE VISIT (OUTPATIENT)
Dept: DERMATOLOGY | Facility: CLINIC | Age: 72
End: 2022-05-11
Payer: MEDICARE

## 2022-05-11 VITALS — DIASTOLIC BLOOD PRESSURE: 80 MMHG | SYSTOLIC BLOOD PRESSURE: 127 MMHG

## 2022-05-11 DIAGNOSIS — C44.629: Primary | ICD-10-CM

## 2022-05-11 PROCEDURE — 12041 INTMD RPR N-HF/GENIT 2.5CM/<: CPT | Performed by: DERMATOLOGY

## 2022-05-11 PROCEDURE — 17311 MOHS 1 STAGE H/N/HF/G: CPT | Performed by: DERMATOLOGY

## 2022-05-11 ASSESSMENT — PAIN SCALES - GENERAL: PAINLEVEL: NO PAIN (0)

## 2022-05-11 NOTE — PROGRESS NOTES
[facility] [place] Procedure Note      Dermatology Problem List:  1. SCC, L dorsal hand Mohs scheduled 5/11/22  2. Vitiligo - began at age 25  3. AK  -s/p cryo  4. Patch, left wrist, monitored  5. Dermatitis, most likely eczema   -s/p triamcinolone 2020  6. COVID vaccine reaction, left bicep      ____________________________________________      Date of Service:  May 11, 2022  Surgery: Mohs micrographic surgery    Case 1  Repair Type: intermediate  Repair Size: 2.5 cm  Suture Material: Fast Absorbing Gut 5-0  Tumor Type: SCC - Squamous cell carcinoma  Location: Left dorsal hand  Derm-Path Accession #: [id number]  PreOp Size: 0.9x0.8 cm  PostOp Size: 1.4x1.2 cm  Mohs Accession #: [id number]  Level of Defect: fascia      Procedure:  We discussed the principles of treatment and most likely complications including scarring, bleeding, infection, swelling, pain, crusting, nerve damage, large wound,  incomplete excision, wound dehiscence,  nerve damage, recurrence, and a second procedure may be recommended to obtain the best cosmetic or functional result.    Informed consent was obtained and the patient underwent the procedure as follows:  The patient was placed supine on the operating table.  The cancer was identified, outlined with a marker, and verified by the patient.  The entire surgical field was prepped with Hibiclens.  The surgical site was anesthetized using Lidocaine 1% with epi 1:100,000.      The area of clinically apparent tumor was debulked. The layer of tissue was then surgically excised using a #15 blade and was then transferred onto a specimen sheet maintaining the orientation of the specimen. Hemostasis was obtained using bipolar electrocoagulation. The wound site was then covered with a dressing while the tissue samples were processed for examination.    The excised tissue was transported to the Mohs histology laboratory maintaining the tissue orientation.  The  tissue specimen was relaxed so that the entire surgical margin was in a a single horizontal plane for sectioning and inked for precise mapping.  A precise reference map was drawn to reflect the sectioning of the specimen, colored inking of the margins, and orientation on the patient. The tissue was processed using horizontal sectioning of the base and continuous peripheral margins.  The histopathologic sections were reviewed in conjunction with the reference map.    Total blocks: 1    Total slides:  1    There were no cancer cells visualized on examination, therefore Mohs surgery was complete.      REPAIR: An intermediate layered linear closure was selected as the procedure which would maximally preserve both function and cosmesis.    After the excision of the tumor, the area was undermined. Hemostasis was obtained with bipolar electrocoagulation.  Closure was oriented so that the wound was in the patient's natural skin tension lines. The subcutaneous and dermal layers were then closed with 5-0 monocryl buried vertical mattress sutures. The epidermis was then carefully approximated along the length of the wound using 5-0 Fast Absorbing Gut simple running sutures.     Estimated blood loss was less than 10 ml for all surgical sites. A sterile pressure dressing was applied and wound care instructions, with a written handout, were given. The patient was discharged from the D[facility]atologic Surgery Center alert and ambulatory.    Follow-up in PRN    Provider Disclosure:   The documentation recorded by the scribe accurately reflects the services I personally performed and the decisions made by me.    I personally performed the procedures today.      Av Morales DO    Department of Dermatology  [facility]: Phone: [phone], Fax:[phone]  [facility]: Phone: [phone], Fax: [phone]    [facility]  and Laboratory Testing Performed at:  [facility]   Dermatology Clinic  [address] [place] [address]

## 2022-05-11 NOTE — PATIENT INSTRUCTIONS
Mohs Wound Care Instructions  I will experience scar, altered skin color, bleeding, swelling, pain, crusting and redness. I may experience altered sensation. Risks are excessive bleeding, infection, muscle weakness, thick (hypertrophic or keloidal) scar, and recurrence. A second procedure may be recommended to obtain the best cosmetic or functional result.  Possible complications of any surgical procedure are bleeding, infection, scarring, alteration in skin color and sensation, muscle weakness in the area, wound dehiscence or seperation, or recurrence of the lesion or disease. On occasion, after healing, a secondary procedure or revision may be recommended in order to obtain the best cosmetic or functional result.   After your surgery, a pressure bandage will be placed over the area that has sutures. This will help prevent bleeding. Please follow these instructions as they will help you to prevent complications as your wound heals.  For the First 48 hours After Surgery:  Leave the pressure bandage on and keep it dry. If it should come loose, you may retape it, but do not take it off.  Relax and take it easy. Do not do any vigorous exercise, heavy lifting, or bending forward. This could cause the wound to bleed.  Post-operative pain is usually mild. You may alternate between 1000 mg of Tylenol (acetaminophen) and 400 mg of Ibuprofen every 4 hours.  Do not take more than 4,000mg of acetaminophen in a 24 hour period or 3200 mg of Ibuprofen in a 24 hr period.  Avoid alcohol and vitamin E as these may increase your tendency to bleed.  You may put an ice pack around the bandaged area for 20 minutes every 2-3 hours. This may help reduce swelling, bruising, and pain. Make sure the ice pack is waterproof so that the pressure bandage does not get wet.   You may see a small amount of drainage or blood on your pressure bandage. This is normal. However, if drainage or bleeding continues or saturates the bandage, you will need  to apply firm pressure over the bandage with a washcloth for 15 minutes. If bleeding continues after applying pressure for 15 minutes then go to the nearest emergency room.  48 Hours After Surgery  Carefully remove the bandage and start daily wound care and dressing changes. You may also now shower and get the wound wet.  Daily Wound Care:  Wash wound with a mild soap and water.  Use caution when washing the wound, be gentle and do not let the forceful shower stream hit the wound directly.  Pat dry.  Apply Vaseline (from a new container or tube) over the suture line with a Q-tip. It is very important to keep the wound continuously moist, as wounds heal best in a moist environment.  Keep the site covered until sutures have been dissolved.  You can cover it with a Telfa (non-stick) dressing and tape or a band-aid.    If you are unable to keep wound covered, you must apply Vaseline every 2-3 hours (while awake) to ensure it is being kept moist for optimal healing. A dressing overnight is recommended to keep the area moist.  Call Us If:  You have pain that is not controlled with Tylenol/Ibuprofen  You have signs or symptoms of an infection, such as: fever over 100 degrees F, redness, warmth, or foul-smelling or yellow drainage from the wound.  Who should I call with questions?  [facility]: [phone]   [facility]: [phone]  For urgent needs outside of business hours call the [facility] at [phone] and ask to speak with the dermatology resident on call

## 2022-05-11 NOTE — LETTER
5/11/2022         RE: Crys Gama  [address] [place] [address]        Dear Colleague,    Thank you for referring your patient, Crys Gama, to the [facility]. Please see a copy of my visit note below.    [facility] [place] Procedure Note      Dermatology Problem List:  1. SCC, L dorsal hand Mohs scheduled 5/11/22  2. Vitiligo - began at age 25  3. AK  -s/p cryo  4. Patch, left wrist, monitored  5. Dermatitis, most likely eczema   -s/p triamcinolone 2020  6. COVID vaccine reaction, left bicep      ____________________________________________      Date of Service:  May 11, 2022  Surgery: Mohs micrographic surgery    Case 1  Repair Type: intermediate  Repair Size: 2.5 cm  Suture Material: Fast Absorbing Gut 5-0  Tumor Type: SCC - Squamous cell carcinoma  Location: Left dorsal hand  Derm-Path Accession #: [id number]  PreOp Size: 0.9x0.8 cm  PostOp Size: 1.4x1.2 cm  Mohs Accession #: [id number]  Level of Defect: fascia      Procedure:  We discussed the principles of treatment and most likely complications including scarring, bleeding, infection, swelling, pain, crusting, nerve damage, large wound,  incomplete excision, wound dehiscence,  nerve damage, recurrence, and a second procedure may be recommended to obtain the best cosmetic or functional result.    Informed consent was obtained and the patient underwent the procedure as follows:  The patient was placed supine on the operating table.  The cancer was identified, outlined with a marker, and verified by the patient.  The entire surgical field was prepped with Hibiclens.  The surgical site was anesthetized using Lidocaine 1% with epi 1:100,000.      The area of clinically apparent tumor was debulked. The layer of tissue was then surgically excised using a #15 blade and was then transferred onto a specimen sheet maintaining the orientation of the specimen. Hemostasis was obtained  using bipolar electrocoagulation. The wound site was then covered with a dressing while the tissue samples were processed for examination.    The excised tissue was transported to the Mohs histology laboratory maintaining the tissue orientation.  The tissue specimen was relaxed so that the entire surgical margin was in a a single horizontal plane for sectioning and inked for precise mapping.  A precise reference map was drawn to reflect the sectioning of the specimen, colored inking of the margins, and orientation on the patient. The tissue was processed using horizontal sectioning of the base and continuous peripheral margins.  The histopathologic sections were reviewed in conjunction with the reference map.    Total blocks: 1    Total slides:  1    There were no cancer cells visualized on examination, therefore Mohs surgery was complete.      REPAIR: An intermediate layered linear closure was selected as the procedure which would maximally preserve both function and cosmesis.    After the excision of the tumor, the area was undermined. Hemostasis was obtained with bipolar electrocoagulation.  Closure was oriented so that the wound was in the patient's natural skin tension lines. The subcutaneous and dermal layers were then closed with 5-0 monocryl buried vertical mattress sutures. The epidermis was then carefully approximated along the length of the wound using 5-0 Fast Absorbing Gut simple running sutures.     Estimated blood loss was less than 10 ml for all surgical sites. A sterile pressure dressing was applied and wound care instructions, with a written handout, were given. The patient was discharged from the Dermatologic Surgery Center alert and ambulatory.    Follow-up in PRN    Provider Disclosure:   The documentation recorded by the scribe accurately reflects the services I personally performed and the decisions made by me.    I personally performed the procedures today.      Av Morales DO  Assistant  Professor  Department of Dermatology  [facility]: Phone: [phone], Fax:[phone]  [facility]: Phone: [phone], Fax: [phone]    Care and Laboratory Testing Performed at:  [facility]   Dermatology Clinic  [address] [place] [address]      Again, thank you for allowing me to participate in the care of your patient.        Sincerely,        Av Morales MD

## 2022-05-11 NOTE — NURSING NOTE
Crys Gama's goals for this visit include:   Chief Complaint   Patient presents with     Procedure     SCC left hand        She requests these members of her care team be copied on today's visit information:     PCP: Daniel Vargas    Referring Provider:  No referring provider defined for this encounter.    /80     Do you need any medication refills at today's visit? No    Beth Jones LPN

## 2022-05-11 NOTE — NURSING NOTE
The following medication was given:     MEDICATION:  Lidocaine with epinephrine 1% 1:[id number]  ROUTE: SQ  SITE: see procedure note  DOSE: 3.5cc  LOT #: -[id number]  : Hospira  EXPIRATION DATE: 12/22  NDC#: 0001-5657-93  Was there drug waste? 2.5cc  Multi-dose vial: Yes    Macy Ashton LPN  May 11, 2022    Vaseline and pressure dressing applied to Mohs site on left dorsal hand.  Wound care instructions reviewed with patient and AVS provided.  Patient verbalized understanding. No further questions or concerns at this time.

## 2022-05-18 ENCOUNTER — MYC MEDICAL ADVICE (OUTPATIENT)
Dept: DERMATOLOGY | Facility: CLINIC | Age: 72
End: 2022-05-18
Payer: MEDICARE

## 2022-05-18 ENCOUNTER — NURSE TRIAGE (OUTPATIENT)
Dept: NURSING | Facility: CLINIC | Age: 72
End: 2022-05-18
Payer: MEDICARE

## 2022-05-18 DIAGNOSIS — T81.49XA WOUND INFECTION AFTER SURGERY: Primary | ICD-10-CM

## 2022-05-18 RX ORDER — CIPROFLOXACIN 500 MG/1
500 TABLET, FILM COATED ORAL 2 TIMES DAILY
Qty: 14 TABLET | Refills: 0 | Status: SHIPPED | OUTPATIENT
Start: 2022-05-18 | End: 2022-05-25

## 2022-05-18 NOTE — TELEPHONE ENCOUNTER
Nurse Triage SBAR    Is this a 2nd Level Triage? YES, LICENSED PRACTITIONER REVIEW IS REQUIRED    Situation: Post op day 6: [facility]  Patient calling with concern for infection.  LEFT dorsum of hand MOHS site clean and not bleeding, reddened area superiorly going up towards middle knuckle of middle finger.   Index, middle and ring finger slightly swollen and feel tight.  No drainage nor pus.  Symptoms began 2 days ago.  No systemic symptoms( denies fever, rash, N/V).    Background:    Date of Service:  May 11, 2022  Surgery: Mohs micrographic surgery     Case 1  Repair Type: intermediate  Repair Size: 2.5 cm  Suture Material: Fast Absorbing Gut 5-0  Tumor Type: SCC - Squamous cell carcinoma  Location: Left dorsal hand  Derm-Path Accession #: [id number]  PreOp Size: 0.9x0.8 cm  PostOp Size: 1.4x1.2 cm  Mohs Accession #: [id number]  Level of Defect: fascia    Assessment: Concern for local infection at LEFT dorsum as evidenced by redness at superior part of wound and finger swelling.. Patient currently using vaseline on wound bed daily, wears bandage to protect area at night. She is right handed.    Protocol Recommended Disposition:   Office visit/ telehealth>  Review with Dermatology team> she may try to send photo per [employer]. She has used [employer] before and we reviewed briefly how to send.    Recommendation: Wound evaluation Day 6 post op Mohs, tightness, redness at top of left hand dorsum and swelling of middle 3 fingers.? Need for short term antibiotic vs clinic visit.  [facility] in [place] is preferred pharmacy    Routed to provider team    Does the patient meet one of the following criteria for ADS visit consideration? Binu    Reason for Disposition    Incision looks infected (spreading redness, pain)    Additional Information    Negative: Major abdominal surgical incision and wound gaping open with visible internal organs    Negative: Sounds like a life-threatening emergency to the triager    Negative: Bleeding  from incision and won't stop after 10 minutes of direct pressure    Negative: Widespread rash and bright red, sunburn-like    Negative: Severe pain in the incision    Negative: Incision gaping open and < 2 days (48 hours) since wound re-opened    Negative: Incision gaping open and length of opening > 2 inches (5 cm)    Negative: Patient sounds very sick or weak to the triager    Negative: Sounds like a serious complication to the triager    Negative: Fever > 100.4 F (38.0 C)    Negative: Red streak runs from the incision and longer than 1 inch (2.5 cm)    Negative: Pus or bad-smelling fluid draining from incision    Negative: Dressing soaked with blood or body fluid (e.g., drainage)    Negative: Raised bruise and size > 2 inches (5 cm) and expanding    Protocols used: POST-OP INCISION SYMPTOMS AND [id number]

## 2022-06-04 LAB
ACID FAST STAIN (ARUP): NORMAL
ACID FAST STAIN (ARUP): NORMAL

## 2022-06-06 ENCOUNTER — OFFICE VISIT (OUTPATIENT)
Dept: DERMATOLOGY | Facility: CLINIC | Age: 72
End: 2022-06-06
Payer: MEDICARE

## 2022-06-06 DIAGNOSIS — Z85.828 HISTORY OF SCC (SQUAMOUS CELL CARCINOMA) OF SKIN: Primary | ICD-10-CM

## 2022-06-06 PROCEDURE — 99024 POSTOP FOLLOW-UP VISIT: CPT | Performed by: DERMATOLOGY

## 2022-06-06 ASSESSMENT — PAIN SCALES - GENERAL: PAINLEVEL: NO PAIN (0)

## 2022-06-06 NOTE — NURSING NOTE
"Crysmaya Gama's goals for this visit include:   Chief Complaint   Patient presents with     Derm Problem     Crys is here today for wound check, pt states \"it is still healing\"       She requests these members of her care team be copied on today's visit information:     PCP: Daniel Vargas    Referring Provider:  No referring provider defined for this encounter.    There were no vitals taken for this visit.    Do you need any medication refills at today's visit? No    Macy Ashton LPN      "

## 2022-06-06 NOTE — LETTER
"    6/6/2022         RE: Crys Gama  [address] [facility] [address]        Dear Colleague,    Thank you for referring your patient, Crys Gama, to the [facility]. Please see a copy of my visit note below.    [facility] Dermatology Note  Encounter Date: Jun 6, 2022  Office Visit     Dermatology Problem List:  1. SCC, L dorsal hand s/p Mohs 5/11/22  2. Vitiligo - began at age 25  3. AK  -s/p cryo  4. Patch, left wrist, monitored  5. Dermatitis, most likely eczema   -s/p triamcinolone 2020  6. COVID vaccine reaction, left bicep   ____________________________________________    Assessment & Plan:    #  SCC, L dorsal hand Mohs 5/11/22. Some inflamed areas, but overall healing appropriately. Review with patient that over time, the scar will soften and healed linearly.   - Continue wound care at home daily.        Procedures Performed:   None     Follow-up: skin check with Dr. Rossi.     Staff and Scribe:     Scribe Disclosure:   I, Jatinder Dasilva, am serving as a scribe to document services personally performed by this physician, Dr. Av Morales, based on data collection and the provider's statements to me.     Provider Disclosure:   The documentation recorded by the scribe accurately reflects the services I personally performed and the decisions made by me.    Av Morales DO    Department of Dermatology  [facility]: Phone: [phone], Fax:[phone]  [facility]: Phone: [phone], Fax: [phone]    ____________________________________________    CC: Derm Problem (Crys is here today for wound check, pt states \"it is still healing\")    HPI:  Ms. Crys Gama is a(n) 72 year old female who presents today as a return patient for a wound check.     Last seen on 5/11/22 for Mohs on the left dorsal hand (SCC).     Today, " patient notes wound on the left dorsal hand is still healing. Notes some areas on the wound was red. Notes opened lesions but closed again.     Patient is otherwise feeling well, without additional skin concerns.      Labs Reviewed:  N/A    Physical Exam:  Vitals: There were no vitals taken for this visit.  SKIN: Focused examination of left dorsal hand was performed.  - Linear pink plaque with central superficial erosion and hemorrhagic crust on the left dorsal hand.   - No other lesions of concern on areas examined.     Medications:  Current Outpatient Medications   Medication     CALCIUM + D OR     Cholecalciferol (VITAMIN D) 50 MCG (2000 UT) CAPS     cyanocobalamin (VITAMIN B-12) 1000 MCG tablet     Multiple Vitamin (MULTI-DAY PO)     simvastatin (ZOCOR) 10 MG tablet     triamcinolone (KENALOG) 0.1 % external cream     No current facility-administered medications for this visit.      Past Medical History:   Patient Active Problem List   Diagnosis     Hyperlipidemia     HTN (hypertension)     GERD (gastroesophageal reflux disease)     Asthma, mild intermittent     HYPERLIPIDEMIA LDL GOAL <130     Hypertension goal BP (blood pressure) < 140/90     Advanced directives, counseling/discussion     Osteoporosis     Osteoporosis, post-menopausal     Angioma of skin     Past Medical History:   Diagnosis Date     Asthma, mild intermittent      Family history of colon cancer     had colonoscopy in 2008, due in 5 years     GERD (gastroesophageal reflux disease)      HTN (hypertension)      Hyperlipidemia      Osteoporosis      Vitiligo         CC No referring provider defined for this encounter. on close of this encounter.      Again, thank you for allowing me to participate in the care of your patient.        Sincerely,        Av Morales MD

## 2022-06-06 NOTE — PROGRESS NOTES
"[facility] Dermatology Note  Encounter Date: Jun 6, 2022  Office Visit     Dermatology Problem List:  1. SCC, L dorsal hand s/p Mohs 5/11/22  2. Vitiligo - began at age 25  3. AK  -s/p cryo  4. Patch, left wrist, monitored  5. Dermatitis, most likely eczema   -s/p triamcinolone 2020  6. COVID vaccine reaction, left bicep   ____________________________________________    Assessment & Plan:    #  SCC, L dorsal hand Mohs 5/11/22. Some inflamed areas, but overall healing appropriately. Review with patient that over time, the scar will soften and healed linearly.   - Continue wound care at home daily.        Procedures Performed:   None     Follow-up: skin check with Dr. Rossi.     Staff and Scribe:     Scribe Disclosure:   I, Jatinder Dasilva, am serving as a scribe to document services personally performed by this physician, Dr. Av Morales, based on data collection and the provider's statements to me.     Provider Disclosure:   The documentation recorded by the scribe accurately reflects the services I personally performed and the decisions made by me.    Av Morales DO    Department of Dermatology  [facility]: Phone: [phone], Fax:[phone]  [facility]: Phone: [phone], Fax: [phone]    ____________________________________________    CC: Derm Problem (Crys is here today for wound check, pt states \"it is still healing\")    HPI:  Ms. Crys Gama is a(n) 72 year old female who presents today as a return patient for a wound check.     Last seen on 5/11/22 for Mohs on the left dorsal hand (SCC).     Today, patient notes wound on the left dorsal hand is still healing. Notes some areas on the wound was red. Notes opened lesions but closed again.     Patient is otherwise feeling well, without additional skin concerns.      Labs Reviewed:  N/A    Physical " Exam:  Vitals: There were no vitals taken for this visit.  SKIN: Focused examination of left dorsal hand was performed.  - Linear pink plaque with central superficial erosion and hemorrhagic crust on the left dorsal hand.   - No other lesions of concern on areas examined.     Medications:  Current Outpatient Medications   Medication     CALCIUM + D OR     Cholecalciferol (VITAMIN D) 50 MCG (2000 UT) CAPS     cyanocobalamin (VITAMIN B-12) 1000 MCG tablet     Multiple Vitamin (MULTI-DAY PO)     simvastatin (ZOCOR) 10 MG tablet     triamcinolone (KENALOG) 0.1 % external cream     No current facility-administered medications for this visit.      Past Medical History:   Patient Active Problem List   Diagnosis     Hyperlipidemia     HTN (hypertension)     GERD (gastroesophageal reflux disease)     Asthma, mild intermittent     HYPERLIPIDEMIA LDL GOAL <130     Hypertension goal BP (blood pressure) < 140/90     Advanced directives, counseling/discussion     Osteoporosis     Osteoporosis, post-menopausal     Angioma of skin     Past Medical History:   Diagnosis Date     Asthma, mild intermittent      Family history of colon cancer     had colonoscopy in 2008, due in 5 years     GERD (gastroesophageal reflux disease)      HTN (hypertension)      Hyperlipidemia      Osteoporosis      Vitiligo         CC No referring provider defined for this encounter. on close of this encounter.

## 2022-11-21 ENCOUNTER — HEALTH MAINTENANCE LETTER (OUTPATIENT)
Age: 72
End: 2022-11-21

## 2023-01-20 ENCOUNTER — OFFICE VISIT (OUTPATIENT)
Dept: DERMATOLOGY | Facility: CLINIC | Age: 73
End: 2023-01-20
Payer: MEDICARE

## 2023-01-20 DIAGNOSIS — D18.01 CHERRY ANGIOMA: ICD-10-CM

## 2023-01-20 DIAGNOSIS — L82.1 SK (SEBORRHEIC KERATOSIS): ICD-10-CM

## 2023-01-20 DIAGNOSIS — L57.0 AK (ACTINIC KERATOSIS): ICD-10-CM

## 2023-01-20 DIAGNOSIS — Z85.828 HISTORY OF SCC (SQUAMOUS CELL CARCINOMA) OF SKIN: Primary | ICD-10-CM

## 2023-01-20 PROCEDURE — 99213 OFFICE O/P EST LOW 20 MIN: CPT | Mod: 25 | Performed by: DERMATOLOGY

## 2023-01-20 PROCEDURE — 17000 DESTRUCT PREMALG LESION: CPT | Performed by: DERMATOLOGY

## 2023-01-20 ASSESSMENT — PAIN SCALES - GENERAL: PAINLEVEL: NO PAIN (0)

## 2023-01-20 NOTE — NURSING NOTE
Crys Gama's chief complaint for this visit includes:  Chief Complaint   Patient presents with     Skin Check     Patient is here for a 6 month skin check. Patient has no new concerns. Patient has hx of SCC.      PCP: Daniel Vargas    Referring Provider:  No referring provider defined for this encounter.    There were no vitals taken for this visit.  No Pain (0)        Allergies   Allergen Reactions     Azithromycin Hives     Erythromycin Hives     Pcn [Penicillins] Hives     Tetracycline Hives         Do you need any medication refills at today's visit? NO    Ricarda Stone MA

## 2023-01-20 NOTE — LETTER
1/20/2023         RE: Crys Gama  [address] [place] [address]        Dear Colleague,    Thank you for referring your patient, Crys Gama, to the [facility]. Please see a copy of my visit note below.    [facility] Dermatology Note  Encounter Date: Jan 20, 2023  Office Visit     Dermatology Problem List:  Last skin check 1/20/23, due every 6 months  1. SCC - L dorsal hand s/p Mohs 5/11/22  2. Vitiligo - began at age 25  3. AKs, s/p cryo  4. Patch, left wrist, monitored  5. Dermatitis, most likely eczema   - s/p triamcinolone 2020  6. COVID vaccine reaction, left bicep     ____________________________________________    Assessment & Plan:    # History of nonmelanoma skin cancer, no clinical evidence of recurrence.  - ABCDEs: Counseled ABCDEs of melanoma: Asymmetry, Border (irregularity), Color (not uniform, changes in color), Diameter (greater than 6 mm which is about the size of a pencil eraser), and Evolving (any changes in preexisting moles).  - Sun protection: Counseled SPF30+ sunscreen, UPF clothing, sun avoidance, tanning bed avoidance.   - Recommended regular skin checks.     # Actinic keratosis - right dorsal hand x 1. Based on the location and chronic irritation to this lesion, treatment with cryotherapy is warranted.   - See cryo procedure note.      # 1.2 cm red patch, left dorsal wrist - unchanged, consistent with hemangioma.   - Unchanged.    # Cherry Angiomas - trunk and extremities. Explained to patient benign nature of lesion. No treatment is necessary at this time unless the lesion changes or becomes symptomatic.      # Seborrheic keratosis, non irritated on the trunk and extremities.       Procedures Performed:   - Cryotherapy procedure note, location(s): right dorsal hand. After verbal consent and discussion of risks and benefits including, but not limited to, dyspigmentation/scar, blister, and pain, 1 AK(s) was(were) treated with  1-2 mm freeze border for 1-2 cycles with liquid nitrogen. Post cryotherapy instructions were provided.      Follow-up: 6 month(s) in-person for a skin check, or earlier for new or changing lesions    Staff and Scribe:     Scribe Disclosure:   I, Mehul Jogeoffrey, am serving as a scribe to document services personally performed by this physician, Dr. Desi Rossi, based on data collection and the provider's statements to me.     Provider Disclosure:   The documentation recorded by the scribe accurately reflects the services I personally performed and the decisions made by me.    Desi Rossi MD    Department of Dermatology  [facility]: Phone: [phone], Fax:[phone]  [facility] [place]: Phone: [phone], Fax: [phone]    ____________________________________________    CC: Skin Check (Patient is here for a 6 month skin check. Patient has no new concerns. Patient has hx of SCC. )    HPI:  Ms. Crys Gama is a(n) 72 year old female who presents today as a return patient for a skin check.    Last seen 6/6/22 by Dr. Morales for a wound check. At that time, the wound was healing appropriately, though some areas were inflamed.     Today, she has no areas of concern.     Patient is otherwise feeling well, without additional skin concerns.    Labs Reviewed:  N/A    Physical Exam:  Vitals: There were no vitals taken for this visit.  SKIN: Total skin excluding the undergarment areas was performed. The exam included the head/face, neck, both arms, chest, back, abdomen, both legs, digits and/or nails.  Declines further exam. Scribe present.   - Well healed scar at site of previous NMSC, no repigmentation or nodularity noted.   - There are waxy stuck on tan to brown papules on the trunk and extremities.    - There are dome shaped bright red papules on the trunk and extremities.   - There is an  erythematous macule with overyling adherent scale on the right dorsal hand x 1.   - No other lesions of concern on areas examined.     Medications:  Current Outpatient Medications   Medication     CALCIUM + D OR     Cholecalciferol (VITAMIN D) 50 MCG (2000 UT) CAPS     cyanocobalamin (VITAMIN B-12) 1000 MCG tablet     Multiple Vitamin (MULTI-DAY PO)     sertraline (ZOLOFT) 50 MG tablet     simvastatin (ZOCOR) 10 MG tablet     triamcinolone (KENALOG) 0.1 % external cream     No current facility-administered medications for this visit.      Past Medical History:   Patient Active Problem List   Diagnosis     Hyperlipidemia     HTN (hypertension)     GERD (gastroesophageal reflux disease)     Asthma, mild intermittent     HYPERLIPIDEMIA LDL GOAL <130     Hypertension goal BP (blood pressure) < 140/90     Advanced directives, counseling/discussion     Osteoporosis     Osteoporosis, post-menopausal     Angioma of skin     Past Medical History:   Diagnosis Date     Asthma, mild intermittent      Family history of colon cancer     had colonoscopy in 2008, due in 5 years     GERD (gastroesophageal reflux disease)      HTN (hypertension)      Hyperlipidemia      Osteoporosis      Squamous cell carcinoma of skin, unspecified      Vitiligo         CC No referring provider defined for this encounter. on close of this encounter.       Again, thank you for allowing me to participate in the care of your patient.        Sincerely,        Desi Rossi MD

## 2023-01-20 NOTE — PROGRESS NOTES
[facility] Dermatology Note  Encounter Date: Jan 20, 2023  Office Visit     Dermatology Problem List:  Last skin check 1/20/23, due every 6 months  1. SCC - L dorsal hand s/p Mohs 5/11/22  2. Vitiligo - began at age 25  3. AKs, s/p cryo  4. Patch, left wrist, monitored  5. Dermatitis, most likely eczema   - s/p triamcinolone 2020  6. COVID vaccine reaction, left bicep     ____________________________________________    Assessment & Plan:    # History of nonmelanoma skin cancer, no clinical evidence of recurrence.  - ABCDEs: Counseled ABCDEs of melanoma: Asymmetry, Border (irregularity), Color (not uniform, changes in color), Diameter (greater than 6 mm which is about the size of a pencil eraser), and Evolving (any changes in preexisting moles).  - Sun protection: Counseled SPF30+ sunscreen, UPF clothing, sun avoidance, tanning bed avoidance.   - Recommended regular skin checks.     # Actinic keratosis - right dorsal hand x 1. Based on the location and chronic irritation to this lesion, treatment with cryotherapy is warranted.   - See cryo procedure note.      # 1.2 cm red patch, left dorsal wrist - unchanged, consistent with hemangioma.   - Unchanged.    # Cherry Angiomas - trunk and extremities. Explained to patient benign nature of lesion. No treatment is necessary at this time unless the lesion changes or becomes symptomatic.      # Seborrheic keratosis, non irritated on the trunk and extremities.       Procedures Performed:   - Cryotherapy procedure note, location(s): right dorsal hand. After verbal consent and discussion of risks and benefits including, but not limited to, dyspigmentation/scar, blister, and pain, 1 AK(s) was(were) treated with 1-2 mm freeze border for 1-2 cycles with liquid nitrogen. Post cryotherapy instructions were provided.      Follow-up: 6 month(s) in-person for a skin check, or earlier for new or changing lesions    Staff and Scribe:     Scribe Disclosure:   DEONNA  Mehul Lin, am serving as a scribe to document services personally performed by this physician, Dr. Desi Rossi, based on data collection and the provider's statements to me.     Provider Disclosure:   The documentation recorded by the scribe accurately reflects the services I personally performed and the decisions made by me.    Desi Rossi MD    Department of Dermatology  [facility]: Phone: [phone], Fax:[phone]  [facility]: Phone: [phone], Fax: [phone]    ____________________________________________    CC: Skin Check (Patient is here for a 6 month skin check. Patient has no new concerns. Patient has hx of SCC. )    HPI:  Ms. Crys Gama is a(n) 72 year old female who presents today as a return patient for a skin check.    Last seen 6/6/22 by Dr. Morales for a wound check. At that time, the wound was healing appropriately, though some areas were inflamed.     Today, she has no areas of concern.     Patient is otherwise feeling well, without additional skin concerns.    Labs Reviewed:  N/A    Physical Exam:  Vitals: There were no vitals taken for this visit.  SKIN: Total skin excluding the undergarment areas was performed. The exam included the head/face, neck, both arms, chest, back, abdomen, both legs, digits and/or nails.  Declines further exam. Scribe present.   - Well healed scar at site of previous NMSC, no repigmentation or nodularity noted.   - There are waxy stuck on tan to brown papules on the trunk and extremities.    - There are dome shaped bright red papules on the trunk and extremities.   - There is an erythematous macule with overyling adherent scale on the right dorsal hand x 1.   - No other lesions of concern on areas examined.     Medications:  Current Outpatient Medications   Medication     CALCIUM + D OR     Cholecalciferol (VITAMIN D) 50 MCG  (2000 UT) CAPS     cyanocobalamin (VITAMIN B-12) 1000 MCG tablet     Multiple Vitamin (MULTI-DAY PO)     sertraline (ZOLOFT) 50 MG tablet     simvastatin (ZOCOR) 10 MG tablet     triamcinolone (KENALOG) 0.1 % external cream     No current facility-administered medications for this visit.      Past Medical History:   Patient Active Problem List   Diagnosis     Hyperlipidemia     HTN (hypertension)     GERD (gastroesophageal reflux disease)     Asthma, mild intermittent     HYPERLIPIDEMIA LDL GOAL <130     Hypertension goal BP (blood pressure) < 140/90     Advanced directives, counseling/discussion     Osteoporosis     Osteoporosis, post-menopausal     Angioma of skin     Past Medical History:   Diagnosis Date     Asthma, mild intermittent      Family history of colon cancer     had colonoscopy in 2008, due in 5 years     GERD (gastroesophageal reflux disease)      HTN (hypertension)      Hyperlipidemia      Osteoporosis      Squamous cell carcinoma of skin, unspecified      Vitiligo         CC No referring provider defined for this encounter. on close of this encounter.

## 2023-01-20 NOTE — PATIENT INSTRUCTIONS
Cryotherapy    What is it?  Use of a very cold liquid, such as liquid nitrogen, to freeze and destroy abnormal skin cells that need to be removed    What should I expect?  Tenderness and redness  A small blister that might grow and fill with dark purple blood. There may be crusting.  More than one treatment may be needed if the lesions do not go away.    How do I care for the treated area?  Gently wash the area with your hands when bathing.  Use a thin layer of Vaseline to help with healing. You may use a Band-Aid.   The area should heal within 7-10 days and may leave behind a pink or lighter color.   Do not use an antibiotic or Neosporin ointment.   You may take acetaminophen (Tylenol) for pain.     Call your doctor if you have:  Severe pain  Signs of infection (warmth, redness, cloudy yellow drainage, and or a bad smell)  Questions or concerns    Who should I call with questions?      [facility]: [phone]      [facility]: [phone]      For urgent needs outside of business hours call the [facility] at [phone] and ask for the dermatology resident on call         Patient Education     Checking for Skin Cancer  You can find cancer early by checking your skin each month. There are 3 kinds of skin cancer. They are melanoma, basal cell carcinoma, and squamous cell carcinoma. Doing monthly skin checks is the best way to find new marks or skin changes. Follow the instructions below for checking your skin.   The ABCDEs of checking moles for melanoma   Check your moles or growths for signs of melanoma using ABCDE:   Asymmetry: the sides of the mole or growth don t match  Border: the edges are ragged, notched, or blurred  Color: the color within the mole or growth varies  Diameter: the mole or growth is larger than 6 mm (size of a pencil eraser)  Evolving: the size, shape, or color of the mole or growth is changing (evolving is not shown in  the images below)    Checking for other types of skin cancer  Basal cell carcinoma or squamous cell carcinoma have symptoms such as:     A spot or mole that looks different from all other marks on your skin  Changes in how an area feels, such as itching, tenderness, or pain  Changes in the skin's surface, such as oozing, bleeding, or scaliness  A sore that does not heal  New swelling or redness beyond the border of a mole    Who s at risk?  Anyone can get skin cancer. But you are at greater risk if you have:   Fair skin, light-colored hair, or light-colored eyes  Many moles or abnormal moles on your skin  A history of sunburns from sunlight or tanning beds  A family history of skin cancer  A history of exposure to radiation or chemicals  A weakened immune system  If you have had skin cancer in the past, you are at risk for recurring skin cancer.   How to check your skin  Do your monthly skin checkups in front of a full-length mirror. Check all parts of your body, including your:   Head (ears, face, neck, and scalp)  Torso (front, back, and sides)  Arms (tops, undersides, upper, and lower armpits)  Hands (palms, backs, and fingers, including under the nails)  Buttocks and genitals  Legs (front, back, and sides)  Feet (tops, soles, toes, including under the nails, and between toes)  If you have a lot of moles, take digital photos of them each month. Make sure to take photos both up close and from a distance. These can help you see if any moles change over time.   Most skin changes are not cancer. But if you see any changes in your skin, call your doctor right away. Only he or she can diagnose a problem. If you have skin cancer, seeing your doctor can be the first step toward getting the treatment that could save your life.   [employer] last reviewed this educational content on 4/1/2019 2000-2020 [employer]. [address] [place] [address], [place] [address]. All rights reserved. This information is not intended  as a substitute for professional medical care. Always follow your healthcare professional's instructions.       When should I call my doctor?  If you are worsening or not improving, please, contact us or seek urgent care as noted below.     Who should I call with questions (adults)?  [facility] (adult and pediatric): [phone]  [facility] (adult): [phone]  For urgent needs outside of business hours call the [facility] at [phone] and ask for the dermatology resident on call to be paged  If this is a medical emergency and you are unable to reach an ER, Call 911    Who should I call with questions (pediatric)?  [facility]- Pediatric Dermatology  Dr. Lana Thompson, Dr. Adrianne Houston, Dr. Renée Gutierrez, VITOR Varner, Dr. Naima Michael, Dr. Elizabeth Arshad & Dr. Norbert Siegel  Non-urgent nurse triage line; [phone]- Jodi and Asmita MILLER Care Coordinators   Syeda (/Complex ) [phone]    If you need a prescription refill, please contact your pharmacy. Refills are approved or denied by our Physicians during normal business hours, Monday through Fridays  Per office policy, refills will not be granted if you have not been seen within the past year (or sooner depending on your child's condition)    Scheduling Information:  Pediatric Appointment Scheduling and Call Center [phone]  Radiology Scheduling- [phone]  Sedation Unit Scheduling- [phone]  [place] Scheduling- General [phone]; Pediatric Dermatology [phone]  Main  Services: [phone]  Estonian: [phone]  Citizen of [place]: [phone]  Hmong/Algerian/German: [phone]  Preadmission Nursing Department Fax Number: [phone] (Fax all pre-operative paperwork to this number)    For urgent matters arising during evenings, weekends, or holidays that cannot wait for normal business hours  please call [phone] and ask for the dermatology resident on call to be paged.

## 2023-01-26 ENCOUNTER — TELEPHONE (OUTPATIENT)
Dept: DERMATOLOGY | Facility: CLINIC | Age: 73
End: 2023-01-26
Payer: MEDICARE

## 2023-01-26 NOTE — TELEPHONE ENCOUNTER
1/26 Called patient and left voicemail about rescheduling 4/13/2023 appointment with Dr. Rossi.     Let patient know we can reschedule to an earlier time with Dr. Fuentes either on 2/9, 2/10, 3/17 or  4/10.     Provided patient with [phone] to reschedule.     Jeanette muhammad Procedure   Dermatology, Surgery, Urology  [facility]

## 2023-04-16 ENCOUNTER — HEALTH MAINTENANCE LETTER (OUTPATIENT)
Age: 73
End: 2023-04-16

## 2023-05-18 ENCOUNTER — TRANSCRIBE ORDERS (OUTPATIENT)
Dept: OTHER | Age: 73
End: 2023-05-18

## 2023-05-18 DIAGNOSIS — I67.1 INTRACEREBRAL ANEURYSM: Primary | ICD-10-CM

## 2023-05-19 NOTE — TELEPHONE ENCOUNTER
Records Requested     May 19, 2023 11:06 AM   [id number]   Facility  [facility]   Outcome 11:09am Sent request for imaging to be pushed to PACS. -NICOLE Maldonado on 5/19/2023 at 2:51 PM Received fax that imaging was done by [facility].-NICOLE     Records Requested     May 19, 2023 2:52 PM   [id number]   Facility  [facility]   Outcome 2:50pm Sent request for imaging to be pushed to PACS. -NICOLE Maldonado on 6/9/2023 at 9:02 AM Imaging resolved into PACS. -NICOLE     RECORDS RECEIVED FROM: Care Everywhere   REASON FOR VISIT: Intracerebral aneurysm   Date of Appt: 6/27/23 8:30am   NOTES (FOR ALL VISITS) STATUS DETAILS   OFFICE NOTE from referring provider Care Everywhere 5/11/23 Daniel Vargas MD @[facility]   MEDICATION LIST Internal    IMAGING  (FOR ALL VISITS)     MRI (HEAD, NECK, SPINE) PACS [facility]  5/12/23 MRA (HARESH) Head  5/11/23 MRI (HARESH) Brain

## 2023-06-26 ENCOUNTER — TRANSFERRED RECORDS (OUTPATIENT)
Dept: HEALTH INFORMATION MANAGEMENT | Facility: CLINIC | Age: 73
End: 2023-06-26
Payer: MEDICARE

## 2023-06-27 ENCOUNTER — PRE VISIT (OUTPATIENT)
Dept: NEUROSURGERY | Facility: CLINIC | Age: 73
End: 2023-06-27

## 2023-06-27 ENCOUNTER — VIRTUAL VISIT (OUTPATIENT)
Dept: NEUROSURGERY | Facility: CLINIC | Age: 73
End: 2023-06-27
Attending: INTERNAL MEDICINE
Payer: MEDICARE

## 2023-06-27 DIAGNOSIS — I67.1 CEREBRAL ANEURYSM, NONRUPTURED: ICD-10-CM

## 2023-06-27 DIAGNOSIS — I67.1 INTRACEREBRAL ANEURYSM: Primary | ICD-10-CM

## 2023-06-27 PROCEDURE — 99443 PR PHYSICIAN TELEPHONE EVALUATION 21-30 MIN: CPT | Mod: 95 | Performed by: NEUROLOGICAL SURGERY

## 2023-06-27 RX ORDER — MIRTAZAPINE 7.5 MG/1
1 TABLET, FILM COATED ORAL AT BEDTIME
COMMUNITY
Start: 2023-05-11

## 2023-06-27 RX ORDER — CETIRIZINE HYDROCHLORIDE 10 MG/1
10 TABLET ORAL DAILY
COMMUNITY

## 2023-06-27 NOTE — NURSING NOTE
Is the patient currently in the state of [place]? YES    Visit mode:TELEPHONE    If the visit is dropped, the patient can be reconnected by: TELEPHONE VISIT: Phone number: [phone]    Will anyone else be joining the visit? NO      How would you like to obtain your AVS? MyChart    Are changes needed to the allergy or medication list? NO    Reason for visit: Consult
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
## 2023-06-28 ENCOUNTER — TELEPHONE (OUTPATIENT)
Dept: NEUROSURGERY | Facility: CLINIC | Age: 73
End: 2023-06-28
Payer: MEDICARE

## 2023-06-28 NOTE — TELEPHONE ENCOUNTER
[facility] Call Center    Phone Message    May a detailed message be left on voicemail: yes     Reason for Call: Other: Pt daughter is calling because the Pt had a neuropsych consult and Pt was diagnosed with early stage alzheimers. Pt daughter is wondering with the Pt brain aneurysm does Dr. Bryan want to wait on surgery since they were informed that any surgery may cause further decline. Please call Pt daughter to discuss     Action Taken: Message routed to:  [facility] ([facility] Neurosurgery    Travel Screening: Not Applicable
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
## 2023-06-28 NOTE — PATIENT INSTRUCTIONS
Our team will contact you early next week regarding treatment or observation of this aneurysm. If treatment, we will arrange for flow diversion treatment. If observation, then we will repeat an MRA brain without contrast in 6 months.    Stroke & Endovascular RN Care Coordinators:    Cecilia Olivarez, RN, BSN  Tena Sexton, RN, CNRN, SCRN    If you have any questions please contact the RN Care Coordinators at [phone], option 1.     After business hours call the  at [phone] and have the Neuro-Interventional Fellow paged.    Thank you for choosing [facility] for your health care needs.

## 2023-06-29 NOTE — TELEPHONE ENCOUNTER
Called patient daughter back to let her know that I have relayed the information to Dr. Bryan and will get back to her as soon as possible. Daughter was in agreement of plan and appreciative of the call.    Cecilia Olviarez RN 6/29/2023 12:36 PM    Response from Dr. Bryan,     Early alzheimer's won't reduce her life expectancy. So there is still value to treatment of the aneurysm. Treatment itself won't cause her alzheimer's to progress. Its just that any complication would be harder to recover from because they have less reserve. The risk is still <5% for flow diversion.     Called patient daughter to let her know what Dr. Bryan said regarding aneurysm treatment. They would like the weekend to think things through and we will call again next week to see if they have made a decision. Family was in agreement with plan and appreciative of the call.     Cecilia Olivarez RN 6/29/2023 1:08 PM

## 2023-07-05 ENCOUNTER — TELEPHONE (OUTPATIENT)
Dept: NEUROLOGY | Facility: CLINIC | Age: 73
End: 2023-07-05
Payer: MEDICARE

## 2023-07-05 NOTE — TELEPHONE ENCOUNTER
Called patient to see if they had made a decision regarding treatment or observation of aneurysm. Patient and family still do not have decision made regarding treatment or observation. Patient daughter states they will call us when they have made a decision, sometime in the next few weeks. They have an appointment with the patient PCP next week and would like their opinion before decision is made. Patient and daughter have my contact information and were encouraged to call in the meantime with any questions/concerns.     Cecilia Olivarez RN 7/5/2023 10:01 AM

## 2023-07-12 ENCOUNTER — TELEPHONE (OUTPATIENT)
Dept: NEUROSURGERY | Facility: CLINIC | Age: 73
End: 2023-07-12
Payer: MEDICARE

## 2023-07-12 DIAGNOSIS — I67.1 CEREBRAL ANEURYSM, NONRUPTURED: Primary | ICD-10-CM

## 2023-07-12 NOTE — TELEPHONE ENCOUNTER
Order placed for patient aneurysm treatment per Dr. Bryan note from 6/27/23. Message sent to scheduling.     Ceciila Olivarez RN 7/12/2023 9:14 AM

## 2023-07-12 NOTE — TELEPHONE ENCOUNTER
Health Call Center    Phone Message    May a detailed message be left on voicemail: yes     Reason for Call: Other: Patients daughter Irene is calling to let Dr. Bryan know that they are in agreement with the plan and surgery. They would like to schedule surgery. Please call back to schedule.      Action Taken: Message routed to:  [facility]    Travel Screening: Not Applicable

## 2023-07-17 ENCOUNTER — TELEPHONE (OUTPATIENT)
Dept: NEUROSURGERY | Facility: CLINIC | Age: 73
End: 2023-07-17
Payer: MEDICARE

## 2023-07-17 NOTE — TELEPHONE ENCOUNTER
Called patient to schedule Procedure with     Date of Procedure: 8/17    Location of Procedure: [place] [place] [facility]    Pre-Op H&P: PCP      Additional comments: Spoke with patient, they are aware of above date and time. Will reach out with any questions and await call from RN Anna C. Schoenecker on 7/17/2023 at 11:09 AM

## 2023-07-27 ENCOUNTER — PATIENT OUTREACH (OUTPATIENT)
Dept: NEUROLOGY | Facility: CLINIC | Age: 73
End: 2023-07-27
Payer: MEDICARE

## 2023-07-27 DIAGNOSIS — I67.1 CEREBRAL ANEURYSM, NONRUPTURED: Primary | ICD-10-CM

## 2023-07-27 RX ORDER — CLOPIDOGREL BISULFATE 75 MG/1
TABLET ORAL
Qty: 30 TABLET | Refills: 3 | Status: SHIPPED | OUTPATIENT
Start: 2023-07-27 | End: 2023-11-14

## 2023-07-27 RX ORDER — ASPIRIN 325 MG
TABLET ORAL
Qty: 30 TABLET | Refills: 3 | Status: SHIPPED | OUTPATIENT
Start: 2023-07-27

## 2023-07-27 NOTE — PROGRESS NOTES
Orders sent for Aspirin and Plavix per Dr. Bryan procedure instructions.     Cecilia Olivarez RN 7/27/2023 2:42 PM

## 2023-07-27 NOTE — PATIENT INSTRUCTIONS
You are scheduled for a cerebral angiogram with Stent placement, under general anesthesia, with Dr. Bryan on 8/17/23. Arrival Time: 10:00am. Procedure Time: 12:00pm.    Please follow these instructions:    * You will need a pre-op physical within 30 days prior to your procedure. You may do this with your primary care provider or with the Pre-Operative Assessment Center (PAC), located at [facility] at [address] [place] [address]. The PAC phone number is [phone].    * Begin taking 325 mg aspirin  and Plavix 75 mg daily on 8/12. You will remain on these medications for your procedure.     * Check in at the Surgery Admission Unit (3C), on the third floor, at the [facility]. The address is [address] [place] [address]. The phone number is [phone]. A map is enclosed.    * Nothing to eat for 8 hours prior to arrival time. You may drink clear liquids (includes water, Jell-O, clear broth, apple juice or any non-carbonated beverage that you can see through) up until 2 hours prior to arrival time.     * You may take your medications with a sip of water the morning of the procedure.     * You will stay overnight at the hospital for observation after the procedure. We aim to discharge you by 11:00 AM the following day. Please have your ride available by 10:00 AM.     PLEASE NOTE our COVID-19 visitors policy: For the protection of our patients and visitors, patients are allowed two consistent visitors, 5 years of age or older, per adult patient in the hospital.     All discharge instructions will be given to the  or volunteer. Documentation for the post-operative plan will be given to the patient and . Patients are required to have someone to stay with them for 24 hours after their procedure.    If you have questions regarding your procedure, please contact me at [phone], option 1.    If you need to cancel, reschedule  or have procedure scheduling related questions, please call Karen at [phone].    Thank you,  Tena Sexton, RN, CNRN, SCRN  Cecilia Olivarez, RN  Stroke & Neuroendovascular Care Coordinator

## 2023-07-27 NOTE — PROGRESS NOTES
LVMOISE for patient letting her know I was calling to go over procedure instructions. Told her I would go ahead and send them through [email or web address]. Left my contact information and encouraged patient to call back with any questions/concerns.    Cecilia Olivarez RN 7/27/2023 10:56 AM

## 2023-07-28 ENCOUNTER — OFFICE VISIT (OUTPATIENT)
Dept: DERMATOLOGY | Facility: CLINIC | Age: 73
End: 2023-07-28
Payer: MEDICARE

## 2023-07-28 DIAGNOSIS — Z85.828 HISTORY OF NONMELANOMA SKIN CANCER: ICD-10-CM

## 2023-07-28 DIAGNOSIS — L80 VITILIGO: ICD-10-CM

## 2023-07-28 DIAGNOSIS — D18.01 CHERRY ANGIOMA: Primary | ICD-10-CM

## 2023-07-28 DIAGNOSIS — D48.5 NEOPLASM OF UNCERTAIN BEHAVIOR OF SKIN: ICD-10-CM

## 2023-07-28 PROCEDURE — 99214 OFFICE O/P EST MOD 30 MIN: CPT | Performed by: DERMATOLOGY

## 2023-07-28 RX ORDER — DONEPEZIL HYDROCHLORIDE 10 MG/1
10 TABLET, FILM COATED ORAL DAILY
COMMUNITY
Start: 2023-07-11

## 2023-07-28 RX ORDER — CELECOXIB 100 MG/1
100 CAPSULE ORAL
Status: ON HOLD | COMMUNITY
Start: 2023-07-11 | End: 2023-10-08

## 2023-07-28 ASSESSMENT — PAIN SCALES - GENERAL: PAINLEVEL: NO PAIN (0)

## 2023-07-28 NOTE — Clinical Note
Pt has likely BCC on lip, pending biopsy. Was seen today and needs her biposy scheduled after her aneurysm procedure.

## 2023-07-28 NOTE — NURSING NOTE
Crys Gama's chief complaint for this visit includes:  Chief Complaint   Patient presents with    Skin Check     FBSE: no concerns. Schedule return for biopsies at a later date if necessary. Hx of NMSC.     PCP: Daniel Vargas    Referring Provider:  No referring provider defined for this encounter.    There were no vitals taken for this visit.  No Pain (0)        Allergies   Allergen Reactions    Azithromycin Hives    Erythromycin Hives    Pcn [Penicillins] Hives    Tetracycline Hives         Do you need any medication refills at today's visit? No    Rachel Spangler, CMA

## 2023-07-28 NOTE — LETTER
7/28/2023         RE: Crys Gama  [address] [place] [address]        Dear Colleague,    Thank you for referring your patient, Crys Gama, to the [facility]. Please see a copy of my visit note below.    [facility] Dermatology Note  Encounter Date: Jul 28, 2023  Office Visit     Dermatology Problem List:  Last skin check 7/28/23, due every 6 months  1. SCC - L dorsal hand s/p Mohs 5/11/22  2. Vitiligo - began at age 25  3. AKs, s/p cryo  4. Patch, left wrist, monitored  5. Dermatitis, most likely eczema   - s/p triamcinolone 2020  6. COVID vaccine reaction, left bicep      ____________________________________________     Assessment & Plan:     # History of nonmelanoma skin cancer, no clinical evidence of recurrence.  - ABCDEs: Counseled ABCDEs of melanoma: Asymmetry, Border (irregularity), Color (not uniform, changes in color), Diameter (greater than 6 mm which is about the size of a pencil eraser), and Evolving (any changes in preexisting moles).  - Sun protection: Counseled SPF30+ sunscreen, UPF clothing, sun avoidance, tanning bed avoidance.   - Recommended regular skin checks.    #Vitiligo- face, trunk and extremities.  -declines treatment    # Cherry Angiomas - trunk and extremities. Explained to patient benign nature of lesion. No treatment is necessary at this time unless the lesion changes or becomes symptomatic.      #Pink papule upper cutaneous lip, possibly BCC  -schedule for biopsy after aneurysm procedure.       Procedures Performed:   None.     Follow-up: for biopsy.     Staff and Scribe:     Scribe Disclosure:   I, Araceli Carvajal, am serving as a scribe to document services personally performed by this physician, Dr. Desi Rossi, based on data collection and the provider's statements to me.         Provider Disclosure:   The documentation recorded by the scribe accurately reflects the services I personally performed and the  decisions made by me.    Desi Rossi MD    Department of Dermatology  [facility]: Phone: [phone], Fax:[phone]  [facility]: Phone: [phone], Fax: [phone]   ____________________________________________    CC: Skin Check (FBSE: no concerns. Schedule return for biopsies at a later date if necessary. Hx of NMSC.)    HPI:  Ms. Crys Gama is a(n) 73 year old female who presents today as a return patient for a skin check. Pt reports she will have upcoming procedures for aneurisms so she would not like any procedures performed today.     Last seen 1/20/23 for a skin check. At that time 1 AK was treated with cryo.     Patient is otherwise feeling well, without additional skin concerns.    Labs Reviewed:  N/A    Physical Exam:  Vitals: There were no vitals taken for this visit.  SKIN: Total skin exam including buttocks was performed. The exam included the head/face, neck, both arms, chest, back, abdomen, both legs, digits and/or nails.   - Well healed scar at site of previous NMSC, no repigmentation or nodularity noted.  -depigmented patches on the body   - There are dome shaped bright red papules on the trunk and extremities.  -Pink papule upper cutaneous lip, possibly telangiectasias  - No other lesions of concern on areas examined.     Medications:  Current Outpatient Medications   Medication     aspirin (ASA) 325 MG tablet     CALCIUM + D OR     cetirizine (ZYRTEC) 10 MG tablet     Cholecalciferol (VITAMIN D) 50 MCG (2000 UT) CAPS     clopidogrel (PLAVIX) 75 MG tablet     cyanocobalamin (VITAMIN B-12) 1000 MCG tablet     mirtazapine (REMERON) 7.5 MG tablet     Multiple Vitamin (MULTI-DAY PO)     sertraline (ZOLOFT) 50 MG tablet     simvastatin (ZOCOR) 10 MG tablet     triamcinolone (KENALOG) 0.1 % external cream     No current facility-administered medications for this  visit.      Past Medical History:   Patient Active Problem List   Diagnosis     Hyperlipidemia     HTN (hypertension)     GERD (gastroesophageal reflux disease)     Asthma, mild intermittent     HYPERLIPIDEMIA LDL GOAL <130     Hypertension goal BP (blood pressure) < 140/90     Advanced directives, counseling/discussion     Osteoporosis     Osteoporosis, post-menopausal     Angioma of skin     History of SCC (squamous cell carcinoma) of skin     Past Medical History:   Diagnosis Date     Asthma, mild intermittent      Family history of colon cancer     had colonoscopy in 2008, due in 5 years     GERD (gastroesophageal reflux disease)      History of nonmelanoma skin cancer      HTN (hypertension)      Hyperlipidemia      Osteoporosis      Squamous cell carcinoma of skin, unspecified      Vitiligo         CC No referring provider defined for this encounter. on close of this encounter.       Again, thank you for allowing me to participate in the care of your patient.        Sincerely,        Desi Rossi MD

## 2023-07-28 NOTE — PATIENT INSTRUCTIONS

## 2023-07-28 NOTE — PROGRESS NOTES
[facility] Dermatology Note  Encounter Date: Jul 28, 2023  Office Visit     Dermatology Problem List:  Last skin check 7/28/23, due every 6 months  1. SCC - L dorsal hand s/p Mohs 5/11/22  2. Vitiligo - began at age 25  3. AKs, s/p cryo  4. Patch, left wrist, monitored  5. Dermatitis, most likely eczema   - s/p triamcinolone 2020  6. COVID vaccine reaction, left bicep      ____________________________________________     Assessment & Plan:     # History of nonmelanoma skin cancer, no clinical evidence of recurrence.  - ABCDEs: Counseled ABCDEs of melanoma: Asymmetry, Border (irregularity), Color (not uniform, changes in color), Diameter (greater than 6 mm which is about the size of a pencil eraser), and Evolving (any changes in preexisting moles).  - Sun protection: Counseled SPF30+ sunscreen, UPF clothing, sun avoidance, tanning bed avoidance.   - Recommended regular skin checks.    #Vitiligo- face, trunk and extremities.  -declines treatment    # Cherry Angiomas - trunk and extremities. Explained to patient benign nature of lesion. No treatment is necessary at this time unless the lesion changes or becomes symptomatic.      #Pink papule upper cutaneous lip, possibly BCC  -schedule for biopsy after aneurysm procedure.       Procedures Performed:   None.     Follow-up: for biopsy.     Staff and Scribe:     Scribe Disclosure:   I, Araceli Carvajal, am serving as a scribe to document services personally performed by this physician, Dr. Desi Rossi, based on data collection and the provider's statements to me.         Provider Disclosure:   The documentation recorded by the scribe accurately reflects the services I personally performed and the decisions made by me.    Desi Rossi MD    Department of Dermatology  [facility]: Phone: [phone], Fax:[phone]  [facility]:  Phone: [phone], Fax: [phone]   ____________________________________________    CC: Skin Check (FBSE: no concerns. Schedule return for biopsies at a later date if necessary. Hx of NMSC.)    HPI:  Ms. Crys Gama is a(n) 73 year old female who presents today as a return patient for a skin check. Pt reports she will have upcoming procedures for aneurisms so she would not like any procedures performed today.     Last seen 1/20/23 for a skin check. At that time 1 AK was treated with cryo.     Patient is otherwise feeling well, without additional skin concerns.    Labs Reviewed:  N/A    Physical Exam:  Vitals: There were no vitals taken for this visit.  SKIN: Total skin exam including buttocks was performed. The exam included the head/face, neck, both arms, chest, back, abdomen, both legs, digits and/or nails.   - Well healed scar at site of previous NMSC, no repigmentation or nodularity noted.  -depigmented patches on the body   - There are dome shaped bright red papules on the trunk and extremities.  -Pink papule upper cutaneous lip, possibly telangiectasias  - No other lesions of concern on areas examined.     Medications:  Current Outpatient Medications   Medication    aspirin (ASA) 325 MG tablet    CALCIUM + D OR    cetirizine (ZYRTEC) 10 MG tablet    Cholecalciferol (VITAMIN D) 50 MCG (2000 UT) CAPS    clopidogrel (PLAVIX) 75 MG tablet    cyanocobalamin (VITAMIN B-12) 1000 MCG tablet    mirtazapine (REMERON) 7.5 MG tablet    Multiple Vitamin (MULTI-DAY PO)    sertraline (ZOLOFT) 50 MG tablet    simvastatin (ZOCOR) 10 MG tablet    triamcinolone (KENALOG) 0.1 % external cream     No current facility-administered medications for this visit.      Past Medical History:   Patient Active Problem List   Diagnosis    Hyperlipidemia    HTN (hypertension)    GERD (gastroesophageal reflux disease)    Asthma, mild intermittent    HYPERLIPIDEMIA LDL GOAL <130    Hypertension goal BP (blood pressure) < 140/90     Advanced directives, counseling/discussion    Osteoporosis    Osteoporosis, post-menopausal    Angioma of skin    History of SCC (squamous cell carcinoma) of skin     Past Medical History:   Diagnosis Date    Asthma, mild intermittent     Family history of colon cancer     had colonoscopy in 2008, due in 5 years    GERD (gastroesophageal reflux disease)     History of nonmelanoma skin cancer     HTN (hypertension)     Hyperlipidemia     Osteoporosis     Squamous cell carcinoma of skin, unspecified     Vitiligo         CC No referring provider defined for this encounter. on close of this encounter.

## 2023-07-31 ENCOUNTER — MYC MEDICAL ADVICE (OUTPATIENT)
Dept: DERMATOLOGY | Facility: CLINIC | Age: 73
End: 2023-07-31
Payer: MEDICARE

## 2023-08-13 RX ORDER — NALOXONE HYDROCHLORIDE 0.4 MG/ML
0.2 INJECTION, SOLUTION INTRAMUSCULAR; INTRAVENOUS; SUBCUTANEOUS
Status: CANCELLED | OUTPATIENT
Start: 2023-08-13

## 2023-08-13 RX ORDER — HEPARIN SODIUM 200 [USP'U]/100ML
1 INJECTION, SOLUTION INTRAVENOUS CONTINUOUS PRN
Status: CANCELLED | OUTPATIENT
Start: 2023-08-13

## 2023-08-13 RX ORDER — FLUMAZENIL 0.1 MG/ML
0.2 INJECTION, SOLUTION INTRAVENOUS
Status: CANCELLED | OUTPATIENT
Start: 2023-08-13

## 2023-08-13 RX ORDER — NALOXONE HYDROCHLORIDE 0.4 MG/ML
0.4 INJECTION, SOLUTION INTRAMUSCULAR; INTRAVENOUS; SUBCUTANEOUS
Status: CANCELLED | OUTPATIENT
Start: 2023-08-13

## 2023-08-13 RX ORDER — FENTANYL CITRATE 50 UG/ML
25-50 INJECTION, SOLUTION INTRAMUSCULAR; INTRAVENOUS EVERY 5 MIN PRN
Status: CANCELLED | OUTPATIENT
Start: 2023-08-13

## 2023-08-16 ENCOUNTER — ANESTHESIA EVENT (OUTPATIENT)
Dept: SURGERY | Facility: CLINIC | Age: 73
DRG: 027 | End: 2023-08-16
Payer: MEDICARE

## 2023-08-17 ENCOUNTER — HOSPITAL ENCOUNTER (INPATIENT)
Facility: CLINIC | Age: 73
LOS: 1 days | Discharge: HOME | DRG: 027 | End: 2023-08-18
Attending: NEUROLOGICAL SURGERY | Admitting: NEUROLOGICAL SURGERY
Payer: MEDICARE

## 2023-08-17 ENCOUNTER — ANESTHESIA (OUTPATIENT)
Dept: SURGERY | Facility: CLINIC | Age: 73
DRG: 027 | End: 2023-08-17
Payer: MEDICARE

## 2023-08-17 ENCOUNTER — APPOINTMENT (OUTPATIENT)
Dept: INTERVENTIONAL RADIOLOGY/VASCULAR | Facility: CLINIC | Age: 73
DRG: 027 | End: 2023-08-17
Attending: NEUROLOGICAL SURGERY
Payer: MEDICARE

## 2023-08-17 DIAGNOSIS — I67.1 CEREBRAL ANEURYSM, NONRUPTURED: ICD-10-CM

## 2023-08-17 LAB
ANION GAP SERPL CALCULATED.3IONS-SCNC: 10 MMOL/L (ref 7–15)
BUN SERPL-MCNC: 20.3 MG/DL (ref 8–23)
CALCIUM SERPL-MCNC: 9.2 MG/DL (ref 8.8–10.2)
CHLORIDE SERPL-SCNC: 105 MMOL/L (ref 98–107)
CREAT SERPL-MCNC: 0.62 MG/DL (ref 0.51–0.95)
DEPRECATED HCO3 PLAS-SCNC: 26 MMOL/L (ref 22–29)
ERYTHROCYTE [DISTWIDTH] IN BLOOD BY AUTOMATED COUNT: 12.1 % (ref 10–15)
GFR SERPL CREATININE-BSD FRML MDRD: >90 ML/MIN/1.73M2
GLUCOSE BLDC GLUCOMTR-MCNC: 108 MG/DL (ref 70–99)
GLUCOSE SERPL-MCNC: 99 MG/DL (ref 70–99)
HCG INTACT+B SERPL-ACNC: 1 MIU/ML
HCT VFR BLD AUTO: 36.9 % (ref 35–47)
HGB BLD-MCNC: 12.2 G/DL (ref 11.7–15.7)
MCH RBC QN AUTO: 28.1 PG (ref 26.5–33)
MCHC RBC AUTO-ENTMCNC: 33.1 G/DL (ref 31.5–36.5)
MCV RBC AUTO: 85 FL (ref 78–100)
PA ADP BLD-ACNC: 51 PRU
PLATELET # BLD AUTO: 201 10E3/UL (ref 150–450)
POTASSIUM SERPL-SCNC: 3.9 MMOL/L (ref 3.4–5.3)
RBC # BLD AUTO: 4.34 10E6/UL (ref 3.8–5.2)
SODIUM SERPL-SCNC: 141 MMOL/L (ref 136–145)
WBC # BLD AUTO: 4 10E3/UL (ref 4–11)

## 2023-08-17 PROCEDURE — 370N000017 HC ANESTHESIA TECHNICAL FEE, PER MIN

## 2023-08-17 PROCEDURE — C1760 CLOSURE DEV, VASC: HCPCS

## 2023-08-17 PROCEDURE — 36224 PLACE CATH CAROTD ART: CPT

## 2023-08-17 PROCEDURE — 250N000013 HC RX MED GY IP 250 OP 250 PS 637: Performed by: STUDENT IN AN ORGANIZED HEALTH CARE EDUCATION/TRAINING PROGRAM

## 2023-08-17 PROCEDURE — 61624 TCAT PERM OCCLS/EMBOLJ CNS: CPT

## 2023-08-17 PROCEDURE — C1887 CATHETER, GUIDING: HCPCS

## 2023-08-17 PROCEDURE — 200N000002 HC R&B ICU UMMC

## 2023-08-17 PROCEDURE — C1769 GUIDE WIRE: HCPCS

## 2023-08-17 PROCEDURE — 85576 BLOOD PLATELET AGGREGATION: CPT

## 2023-08-17 PROCEDURE — 76937 US GUIDE VASCULAR ACCESS: CPT

## 2023-08-17 PROCEDURE — 61635 INTRACRAN ANGIOPLSTY W/STENT: CPT | Mod: GC | Performed by: NEUROLOGICAL SURGERY

## 2023-08-17 PROCEDURE — 258N000003 HC RX IP 258 OP 636

## 2023-08-17 PROCEDURE — 710N000010 HC RECOVERY PHASE 1, LEVEL 2, PER MIN

## 2023-08-17 PROCEDURE — 250N000025 HC SEVOFLURANE, PER MIN

## 2023-08-17 PROCEDURE — 82310 ASSAY OF CALCIUM: CPT | Performed by: INTERNAL MEDICINE

## 2023-08-17 PROCEDURE — 272N000566 HC SHEATH CR3

## 2023-08-17 PROCEDURE — B3161ZZ FLUOROSCOPY OF RIGHT INTERNAL CAROTID ARTERY USING LOW OSMOLAR CONTRAST: ICD-10-PCS | Performed by: NEUROLOGICAL SURGERY

## 2023-08-17 PROCEDURE — 250N000011 HC RX IP 250 OP 636: Mod: JZ | Performed by: STUDENT IN AN ORGANIZED HEALTH CARE EDUCATION/TRAINING PROGRAM

## 2023-08-17 PROCEDURE — 250N000011 HC RX IP 250 OP 636: Mod: JZ

## 2023-08-17 PROCEDURE — 255N000002 HC RX 255 OP 636: Performed by: NEUROLOGICAL SURGERY

## 2023-08-17 PROCEDURE — 76937 US GUIDE VASCULAR ACCESS: CPT | Mod: 26 | Performed by: NEUROLOGICAL SURGERY

## 2023-08-17 PROCEDURE — 85027 COMPLETE CBC AUTOMATED: CPT | Performed by: INTERNAL MEDICINE

## 2023-08-17 PROCEDURE — 272N000506 HC NEEDLE CR6

## 2023-08-17 PROCEDURE — 75898 FOLLOW-UP ANGIOGRAPHY: CPT

## 2023-08-17 PROCEDURE — B3131ZZ FLUOROSCOPY OF RIGHT COMMON CAROTID ARTERY USING LOW OSMOLAR CONTRAST: ICD-10-PCS | Performed by: NEUROLOGICAL SURGERY

## 2023-08-17 PROCEDURE — C1889 IMPLANT/INSERT DEVICE, NOC: HCPCS

## 2023-08-17 PROCEDURE — 36415 COLL VENOUS BLD VENIPUNCTURE: CPT | Performed by: INTERNAL MEDICINE

## 2023-08-17 PROCEDURE — 272N000192 HC ACCESSORY CR2

## 2023-08-17 PROCEDURE — 999N000141 HC STATISTIC PRE-PROCEDURE NURSING ASSESSMENT

## 2023-08-17 PROCEDURE — 250N000009 HC RX 250

## 2023-08-17 PROCEDURE — 75894 X-RAYS TRANSCATH THERAPY: CPT

## 2023-08-17 PROCEDURE — 03VG3HZ RESTRICTION OF INTRACRANIAL ARTERY WITH INTRALUMINAL DEVICE, FLOW DIVERTER, PERCUTANEOUS APPROACH: ICD-10-PCS | Performed by: NEUROLOGICAL SURGERY

## 2023-08-17 PROCEDURE — 250N000009 HC RX 250: Performed by: STUDENT IN AN ORGANIZED HEALTH CARE EDUCATION/TRAINING PROGRAM

## 2023-08-17 PROCEDURE — 272N000116 HC CATH CR1

## 2023-08-17 PROCEDURE — 84702 CHORIONIC GONADOTROPIN TEST: CPT | Performed by: INTERNAL MEDICINE

## 2023-08-17 PROCEDURE — 272N000572 HC SHEATH CR9

## 2023-08-17 PROCEDURE — 250N000011 HC RX IP 250 OP 636: Performed by: ANESTHESIOLOGY

## 2023-08-17 RX ORDER — FENTANYL CITRATE 50 UG/ML
INJECTION, SOLUTION INTRAMUSCULAR; INTRAVENOUS PRN
Status: DISCONTINUED | OUTPATIENT
Start: 2023-08-17 | End: 2023-08-17

## 2023-08-17 RX ORDER — FENTANYL CITRATE 50 UG/ML
25 INJECTION, SOLUTION INTRAMUSCULAR; INTRAVENOUS EVERY 5 MIN PRN
Status: DISCONTINUED | OUTPATIENT
Start: 2023-08-17 | End: 2023-08-18 | Stop reason: HOSPADM

## 2023-08-17 RX ORDER — SODIUM CHLORIDE, SODIUM LACTATE, POTASSIUM CHLORIDE, CALCIUM CHLORIDE 600; 310; 30; 20 MG/100ML; MG/100ML; MG/100ML; MG/100ML
INJECTION, SOLUTION INTRAVENOUS CONTINUOUS
Status: DISCONTINUED | OUTPATIENT
Start: 2023-08-17 | End: 2023-08-18 | Stop reason: HOSPADM

## 2023-08-17 RX ORDER — CLOPIDOGREL BISULFATE 75 MG/1
75 TABLET ORAL DAILY
Status: DISCONTINUED | OUTPATIENT
Start: 2023-08-18 | End: 2023-08-18 | Stop reason: HOSPADM

## 2023-08-17 RX ORDER — SODIUM CHLORIDE 9 MG/ML
INJECTION, SOLUTION INTRAVENOUS CONTINUOUS
Status: DISCONTINUED | OUTPATIENT
Start: 2023-08-17 | End: 2023-08-18 | Stop reason: HOSPADM

## 2023-08-17 RX ORDER — DEXTROSE MONOHYDRATE 25 G/50ML
25-50 INJECTION, SOLUTION INTRAVENOUS
Status: DISCONTINUED | OUTPATIENT
Start: 2023-08-17 | End: 2023-08-18 | Stop reason: HOSPADM

## 2023-08-17 RX ORDER — OXYCODONE HYDROCHLORIDE 10 MG/1
10 TABLET ORAL
Status: DISCONTINUED | OUTPATIENT
Start: 2023-08-17 | End: 2023-08-18 | Stop reason: HOSPADM

## 2023-08-17 RX ORDER — ONDANSETRON 4 MG/1
4 TABLET, ORALLY DISINTEGRATING ORAL EVERY 30 MIN PRN
Status: DISCONTINUED | OUTPATIENT
Start: 2023-08-17 | End: 2023-08-18 | Stop reason: HOSPADM

## 2023-08-17 RX ORDER — ONDANSETRON 2 MG/ML
4 INJECTION INTRAMUSCULAR; INTRAVENOUS EVERY 30 MIN PRN
Status: DISCONTINUED | OUTPATIENT
Start: 2023-08-17 | End: 2023-08-18 | Stop reason: HOSPADM

## 2023-08-17 RX ORDER — ASPIRIN 325 MG
325 TABLET ORAL DAILY
Status: DISCONTINUED | OUTPATIENT
Start: 2023-08-18 | End: 2023-08-18 | Stop reason: HOSPADM

## 2023-08-17 RX ORDER — SODIUM CHLORIDE, SODIUM LACTATE, POTASSIUM CHLORIDE, CALCIUM CHLORIDE 600; 310; 30; 20 MG/100ML; MG/100ML; MG/100ML; MG/100ML
INJECTION, SOLUTION INTRAVENOUS CONTINUOUS PRN
Status: DISCONTINUED | OUTPATIENT
Start: 2023-08-17 | End: 2023-08-17

## 2023-08-17 RX ORDER — PROPOFOL 10 MG/ML
INJECTION, EMULSION INTRAVENOUS PRN
Status: DISCONTINUED | OUTPATIENT
Start: 2023-08-17 | End: 2023-08-17

## 2023-08-17 RX ORDER — IODIXANOL 320 MG/ML
100 INJECTION, SOLUTION INTRAVASCULAR ONCE
Status: COMPLETED | OUTPATIENT
Start: 2023-08-17 | End: 2023-08-17

## 2023-08-17 RX ORDER — DEXAMETHASONE SODIUM PHOSPHATE 4 MG/ML
INJECTION, SOLUTION INTRA-ARTICULAR; INTRALESIONAL; INTRAMUSCULAR; INTRAVENOUS; SOFT TISSUE PRN
Status: DISCONTINUED | OUTPATIENT
Start: 2023-08-17 | End: 2023-08-17

## 2023-08-17 RX ORDER — SIMVASTATIN 10 MG
10 TABLET ORAL AT BEDTIME
Status: DISCONTINUED | OUTPATIENT
Start: 2023-08-17 | End: 2023-08-18 | Stop reason: HOSPADM

## 2023-08-17 RX ORDER — HYDROMORPHONE HYDROCHLORIDE 1 MG/ML
0.2 INJECTION, SOLUTION INTRAMUSCULAR; INTRAVENOUS; SUBCUTANEOUS EVERY 5 MIN PRN
Status: DISCONTINUED | OUTPATIENT
Start: 2023-08-17 | End: 2023-08-18 | Stop reason: HOSPADM

## 2023-08-17 RX ORDER — HEPARIN SODIUM 1000 [USP'U]/ML
INJECTION, SOLUTION INTRAVENOUS; SUBCUTANEOUS PRN
Status: DISCONTINUED | OUTPATIENT
Start: 2023-08-17 | End: 2023-08-17

## 2023-08-17 RX ORDER — NICOTINE POLACRILEX 4 MG
15-30 LOZENGE BUCCAL
Status: DISCONTINUED | OUTPATIENT
Start: 2023-08-17 | End: 2023-08-18 | Stop reason: HOSPADM

## 2023-08-17 RX ORDER — MIRTAZAPINE 7.5 MG/1
7.5 TABLET, FILM COATED ORAL AT BEDTIME
Status: DISCONTINUED | OUTPATIENT
Start: 2023-08-17 | End: 2023-08-18 | Stop reason: HOSPADM

## 2023-08-17 RX ORDER — LIDOCAINE HYDROCHLORIDE 10 MG/ML
1-30 INJECTION, SOLUTION EPIDURAL; INFILTRATION; INTRACAUDAL; PERINEURAL
Status: COMPLETED | OUTPATIENT
Start: 2023-08-17 | End: 2023-08-17

## 2023-08-17 RX ORDER — ONDANSETRON 2 MG/ML
INJECTION INTRAMUSCULAR; INTRAVENOUS PRN
Status: DISCONTINUED | OUTPATIENT
Start: 2023-08-17 | End: 2023-08-17

## 2023-08-17 RX ORDER — FENTANYL CITRATE 50 UG/ML
50 INJECTION, SOLUTION INTRAMUSCULAR; INTRAVENOUS EVERY 5 MIN PRN
Status: DISCONTINUED | OUTPATIENT
Start: 2023-08-17 | End: 2023-08-18 | Stop reason: HOSPADM

## 2023-08-17 RX ORDER — DONEPEZIL HYDROCHLORIDE 5 MG/1
5 TABLET, FILM COATED ORAL EVERY EVENING
Status: DISCONTINUED | OUTPATIENT
Start: 2023-08-17 | End: 2023-08-18 | Stop reason: HOSPADM

## 2023-08-17 RX ORDER — HYDROMORPHONE HYDROCHLORIDE 1 MG/ML
0.4 INJECTION, SOLUTION INTRAMUSCULAR; INTRAVENOUS; SUBCUTANEOUS EVERY 5 MIN PRN
Status: DISCONTINUED | OUTPATIENT
Start: 2023-08-17 | End: 2023-08-18 | Stop reason: HOSPADM

## 2023-08-17 RX ORDER — LIDOCAINE HYDROCHLORIDE 20 MG/ML
INJECTION, SOLUTION INFILTRATION; PERINEURAL PRN
Status: DISCONTINUED | OUTPATIENT
Start: 2023-08-17 | End: 2023-08-17

## 2023-08-17 RX ORDER — LIDOCAINE 40 MG/G
CREAM TOPICAL
Status: DISCONTINUED | OUTPATIENT
Start: 2023-08-17 | End: 2023-08-18 | Stop reason: HOSPADM

## 2023-08-17 RX ORDER — OXYCODONE HYDROCHLORIDE 5 MG/1
5 TABLET ORAL
Status: DISCONTINUED | OUTPATIENT
Start: 2023-08-17 | End: 2023-08-18 | Stop reason: HOSPADM

## 2023-08-17 RX ORDER — HEPARIN SODIUM 200 [USP'U]/100ML
1 INJECTION, SOLUTION INTRAVENOUS CONTINUOUS PRN
Status: DISCONTINUED | OUTPATIENT
Start: 2023-08-17 | End: 2023-08-17

## 2023-08-17 RX ADMIN — SUGAMMADEX 200 MG: 100 INJECTION, SOLUTION INTRAVENOUS at 15:07

## 2023-08-17 RX ADMIN — PHENYLEPHRINE HYDROCHLORIDE 100 MCG: 10 INJECTION INTRAVENOUS at 13:45

## 2023-08-17 RX ADMIN — Medication 50 MG: at 13:07

## 2023-08-17 RX ADMIN — FENTANYL CITRATE 25 MCG: 50 INJECTION, SOLUTION INTRAMUSCULAR; INTRAVENOUS at 16:43

## 2023-08-17 RX ADMIN — LIDOCAINE HYDROCHLORIDE 6 ML: 10 INJECTION, SOLUTION EPIDURAL; INFILTRATION; INTRACAUDAL; PERINEURAL at 13:40

## 2023-08-17 RX ADMIN — FENTANYL CITRATE 25 MCG: 50 INJECTION, SOLUTION INTRAMUSCULAR; INTRAVENOUS at 16:37

## 2023-08-17 RX ADMIN — PROPOFOL 100 MG: 10 INJECTION, EMULSION INTRAVENOUS at 13:07

## 2023-08-17 RX ADMIN — DEXAMETHASONE SODIUM PHOSPHATE 4 MG: 4 INJECTION, SOLUTION INTRA-ARTICULAR; INTRALESIONAL; INTRAMUSCULAR; INTRAVENOUS; SOFT TISSUE at 13:15

## 2023-08-17 RX ADMIN — DONEPEZIL HYDROCHLORIDE 5 MG: 5 TABLET, FILM COATED ORAL at 21:21

## 2023-08-17 RX ADMIN — HYDROMORPHONE HYDROCHLORIDE 0.5 MG: 1 INJECTION, SOLUTION INTRAMUSCULAR; INTRAVENOUS; SUBCUTANEOUS at 14:55

## 2023-08-17 RX ADMIN — HEPARIN SODIUM 6000 UNITS: 1000 INJECTION INTRAVENOUS; SUBCUTANEOUS at 13:41

## 2023-08-17 RX ADMIN — Medication 20 MG: at 14:36

## 2023-08-17 RX ADMIN — HEPARIN SODIUM 1000 UNITS: 1000 INJECTION INTRAVENOUS; SUBCUTANEOUS at 14:39

## 2023-08-17 RX ADMIN — MIRTAZAPINE 7.5 MG: 7.5 TABLET, FILM COATED ORAL at 21:21

## 2023-08-17 RX ADMIN — Medication 20 MG: at 13:39

## 2023-08-17 RX ADMIN — ONDANSETRON 4 MG: 2 INJECTION INTRAMUSCULAR; INTRAVENOUS at 15:07

## 2023-08-17 RX ADMIN — HEPARIN SODIUM 6 BAG: 200 INJECTION, SOLUTION INTRAVENOUS at 15:14

## 2023-08-17 RX ADMIN — IODIXANOL 75 ML: 320 INJECTION, SOLUTION INTRAVASCULAR at 15:36

## 2023-08-17 RX ADMIN — FENTANYL CITRATE 50 MCG: 50 INJECTION, SOLUTION INTRAMUSCULAR; INTRAVENOUS at 13:37

## 2023-08-17 RX ADMIN — FENTANYL CITRATE 50 MCG: 50 INJECTION, SOLUTION INTRAMUSCULAR; INTRAVENOUS at 13:07

## 2023-08-17 RX ADMIN — SODIUM CHLORIDE, POTASSIUM CHLORIDE, SODIUM LACTATE AND CALCIUM CHLORIDE: 600; 310; 30; 20 INJECTION, SOLUTION INTRAVENOUS at 13:01

## 2023-08-17 RX ADMIN — LIDOCAINE HYDROCHLORIDE 80 MG: 20 INJECTION, SOLUTION INFILTRATION; PERINEURAL at 13:07

## 2023-08-17 RX ADMIN — SIMVASTATIN 10 MG: 10 TABLET, FILM COATED ORAL at 21:21

## 2023-08-17 ASSESSMENT — VISUAL ACUITY
OU: NORMAL ACUITY
OU: BASELINE;NORMAL ACUITY
OU: NORMAL ACUITY
OU: BASELINE;NORMAL ACUITY
OU: BASELINE;NORMAL ACUITY
OU: NORMAL ACUITY

## 2023-08-17 ASSESSMENT — ACTIVITIES OF DAILY LIVING (ADL)
ADLS_ACUITY_SCORE: 21
ADLS_ACUITY_SCORE: 25
ADLS_ACUITY_SCORE: 21

## 2023-08-17 NOTE — ANESTHESIA CARE TRANSFER NOTE
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    Anesthesia Type:   General     Note:    Oropharynx: oropharynx clear of all foreign objects and spontaneously breathing  Level of Consciousness: awake  Oxygen Supplementation: nasal cannula  Level of Supplemental Oxygen (L/min / FiO2): 2  Independent Airway: airway patency satisfactory and stable  Dentition: dentition unchanged  Vital Signs Stable: post-procedure vital signs reviewed and stable  Report to RN Given: handoff report given  Patient transferred to: PACU    Handoff Report: Identifed the Patient, Identified the Reponsible Provider, Reviewed the pertinent medical history, Discussed the surgical course, Reviewed Intra-OP anesthesia mangement and issues during anesthesia, Set expectations for post-procedure period and Allowed opportunity for questions and acknowledgement of understanding      Vitals:  Vitals Value Taken Time   /75 08/17/23 1524   Temp     Pulse 85 08/17/23 1527   Resp     SpO2 97 % 08/17/23 1527   Vitals shown include unvalidated device data.    Electronically Signed By: HERNANDEZ Wade CRNA  August 17, 2023  3:28 PM

## 2023-08-17 NOTE — PROCEDURES
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    Primary Surgeon:  Dr. Xochitl Bryan  Secondary Surgeon:  Not applicable  Secondary Surgeon Review:  None  Fellow:  Sukhjinder Moreno Qaryouti  Additional Assistants:  none    Prior to the start of the procedure and with procedural staff participation, I verbally confirmed: the patient s identity using two indicators, relevant allergies, that the procedure was appropriate and matched the consent or emergent situation, and that the correct equipment/implants were available. Immediately prior to starting the procedure I conducted the Time Out with the procedural staff and re-confirmed the patient s name, procedure, and site/side. (The Joint Commission universal protocol was followed.)  Yes    PRU value:  51    Anesthesia:  Performed by Anesthesia  Medications:   Lidocaine 1% 10 ml intradermal, heparin 7000 mg IV  Puncture site:  Right Femoral Artery    Fluoroscopy time (minutes):  54  Radiation dose (mGy):   1624     Contrast amount (mL):   75      Estimated blood loss (mL):  20    Closure:  Device    Disposition:  Will be followed in hospital by the Neuro Endovascular team.        Sedation Post-Procedure Summary    Per Anesthesia    Christy Moreno MD  Pager:  [phone]      See official report in PACS  JEFFREY Bryan MD

## 2023-08-17 NOTE — PROGRESS NOTES
This 73 year old woman presents today for endovascular flow diversion of a right ophthalmic segment ICA aneurysm. She and her family understand the risks of the procedure described before and agree to proceed.  JEFFREY Bryan MD

## 2023-08-17 NOTE — OR NURSING
Spoke with Sathya Monteiro, informed him that pt did not have stat lab draw completed before being taken back for procedure. He stated he will draw it now.

## 2023-08-17 NOTE — ANESTHESIA PROCEDURE NOTES
Airway       Patient location during procedure: OR       Procedure Start/Stop Times: 8/17/2023 1:11 PM  Staff -        Anesthesiologist:  Mk Berger MD       CRNA: Sathya Monteiro APRN CRNA       Performed By: CRNA  Consent for Airway        Urgency: elective  Indications and Patient Condition       Indications for airway management: charisse-procedural       Induction type:intravenous       Mask difficulty assessment: 1 - vent by mask    Final Airway Details       Final airway type: endotracheal airway       Successful airway: ETT - single and Oral  Endotracheal Airway Details        ETT size (mm): 6.5       Cuffed: yes       Successful intubation technique: direct laryngoscopy       DL Blade Type: Vasquez 2       Grade View of Cords: 1       Adjucts: stylet       Position: Right       Measured from: lips       Secured at (cm): 22       Bite block used: None    Post intubation assessment        Placement verified by: capnometry and equal breath sounds        Number of attempts at approach: 1       Number of other approaches attempted: 0       Secured with: silk tape       Ease of procedure: easy       Dentition: Intact and Unchanged    Medication(s) Administered   Medication Administration Time: 8/17/2023 1:11 PM

## 2023-08-17 NOTE — ANESTHESIA POSTPROCEDURE EVALUATION
Patient: Crys Gama    Procedure: Procedure(s):  ANESTHESIA OUT OF OR Bilateral Carotid Cerebral Angiogram @1200       Anesthesia Type:  General    Note:  Disposition: Admission   Postop Pain Control: Uneventful            Sign Out: Well controlled pain   PONV: No   Neuro/Psych: Uneventful            Sign Out: Acceptable/Baseline neuro status   Airway/Respiratory: Uneventful            Sign Out: Acceptable/Baseline resp. status   CV/Hemodynamics: Uneventful            Sign Out: Acceptable CV status; No obvious hypovolemia; No obvious fluid overload   Other NRE: NONE   DID A NON-ROUTINE EVENT OCCUR? No       Last vitals:  Vitals Value Taken Time   /70 08/17/23 1615   Temp 36.6  C (97.9  F) 08/17/23 1530   Pulse 96 08/17/23 1621   Resp     SpO2 98 % 08/17/23 1621   Vitals shown include unvalidated device data.    Electronically Signed By: Mk Berger MD  August 17, 2023  4:23 PM

## 2023-08-17 NOTE — ANESTHESIA PREPROCEDURE EVALUATION
Anesthesia Pre-Procedure Evaluation    Patient: Crys Gama   MRN: [id number] : 1950        Procedure : Procedure(s):  ANESTHESIA OUT OF OR Bilateral Carotid Cerebral Angiogram @1200          Past Medical History:   Diagnosis Date     Asthma, mild intermittent      Family history of colon cancer     had colonoscopy in , due in 5 years     GERD (gastroesophageal reflux disease)      History of nonmelanoma skin cancer      HTN (hypertension)      Hyperlipidemia      Osteoporosis      Squamous cell carcinoma of skin, unspecified      Vitiligo       Past Surgical History:   Procedure Laterality Date     HC REMOVAL GALLBLADDER  -     SURGICAL HISTORY OF -       wrist and finger surgery for fracture      Allergies   Allergen Reactions     Azithromycin Hives     Erythromycin Hives     Pcn [Penicillins] Hives     Tetracycline Hives      Social History     Tobacco Use     Smoking status: Never     Smokeless tobacco: Never   Substance Use Topics     Alcohol use: No      Wt Readings from Last 1 Encounters:   23 58.6 kg (129 lb 3 oz)        Anesthesia Evaluation   Pt has had prior anesthetic. Type: General.        ROS/MED HX  ENT/Pulmonary:     (+)                     asthma                  Neurologic: Comment: Aneurism       Cardiovascular:     (+) Dyslipidemia hypertension- -   -  - -                                      METS/Exercise Tolerance:     Hematologic:       Musculoskeletal:       GI/Hepatic:     (+) GERD, Asymptomatic on medication,                  Renal/Genitourinary:       Endo:       Psychiatric/Substance Use:       Infectious Disease:       Malignancy:       Other:          Physical Exam    Airway        Mallampati: II   TM distance: > 3 FB   Neck ROM: full   Mouth opening: > 3 cm    Respiratory Devices and Support         Dental       (+) Modest Abnormalities - crowns, retainers, 1 or 2 missing teeth      Cardiovascular   cardiovascular exam normal       Rhythm and rate: regular  and normal     Pulmonary   pulmonary exam normal        breath sounds clear to auscultation       OUTSIDE LABS:  CBC:   Lab Results   Component Value Date    WBC 4.0 08/17/2023    WBC 5.5 05/08/2012    HGB 12.2 08/17/2023    HGB 13.6 05/08/2012    HCT 36.9 08/17/2023    HCT 40.8 05/08/2012     08/17/2023     05/08/2012     BMP:   Lab Results   Component Value Date     08/17/2023     02/20/2013    POTASSIUM 3.9 08/17/2023    POTASSIUM 4.2 02/20/2013    CHLORIDE 105 08/17/2023    CHLORIDE 100 02/20/2013    CO2 26 08/17/2023    CO2 27 02/20/2013    BUN 20.3 08/17/2023    BUN 15 02/20/2013    CR 0.62 08/17/2023    CR 0.95 02/20/2013    GLC 99 08/17/2023    GLC 93 02/20/2013     COAGS: No results found for: PTT, INR, FIBR  POC: No results found for: BGM, HCG, HCGS  HEPATIC:   Lab Results   Component Value Date    ALBUMIN 3.9 02/20/2013    PROTTOTAL 6.7 (L) 04/11/2012    ALT 27 08/07/2012    AST 28 08/07/2012    GGT 7 11/18/2010    ALKPHOS 58 04/11/2012    BILITOTAL 0.3 04/11/2012    BILIDIRECT 0.14 11/18/2010     OTHER:   Lab Results   Component Value Date    FRANK 9.2 08/17/2023    PHOS 3.3 02/20/2013    TSH 3.30 12/12/2017    SED 2 10/05/2009       Anesthesia Plan    ASA Status:  3    NPO Status:  NPO Appropriate    Anesthesia Type: General.     - Airway: ETT   Induction: Intravenous.   Maintenance: Balanced.        Consents    Anesthesia Plan(s) and associated risks, benefits, and realistic alternatives discussed. Questions answered and patient/representative(s) expressed understanding.     - Discussed: Risks, Benefits and Alternatives for BOTH SEDATION and the PROCEDURE were discussed     - Discussed with:  Patient, Spouse      - Extended Intubation/Ventilatory Support Discussed: No.      - Patient is DNR/DNI Status: No     Use of blood products discussed: No .     Postoperative Care    Pain management: IV analgesics, Oral pain medications, Multi-modal analgesia.   PONV prophylaxis:  Ondansetron (or other 5HT-3), Dexamethasone or Solumedrol     Comments:              Mk Berger MD

## 2023-08-17 NOTE — IR NOTE
Patient Name: Crys Gama  Medical Record Number: [id number]  Today's Date: 8/17/2023    Procedure: Diagnostic Cerebral angiogram with aneurysm treatment  Proceduralist: Dr. Moreno, Dr. Slaughter, Dr. Pena & Dr. Bryan  Pathology present: N/A    Pt arrived at 1300    Procedure Start: 1331  Procedure end: 1510  Sedation medications administered: N/A, General Anesthesia case     Pt departed room at 1520     Right Groin accessed at 1336  Sheath removed at 1510 Manual pressure held for 2 minutes; Closure device deployed Angio-Seal   Groin site appearance: CDI  Pedal pulse assessment:  present   Additional Puncture site(s): N/A  Other:    Bedrest for 2 hours; Restrictions end at 1710.    Report given to: CRNA and PACU RN given post procedure considerations  : N/A    Other Notes: Pt arrived to IR room 3 from . Consent reviewed. Pt denies any questions or concerns regarding procedure. Pt positioned supine and monitored per protocol. Pt tolerated procedure without any noted complications. Pt transferred back to PACU.

## 2023-08-17 NOTE — PROGRESS NOTES
[facility]     Endovascular Surgical Neuroradiology Pre-Procedure Note      HPI:  Crys Gama is a 73 year old female with known medical history of neurocognitive disorder, anxiety and osteopetrosis. She was found to have right para ophthalmic superiorly directed aneurysm measuring [id number] mm with a narrow neck and concerns for small focal outpouching anterior medially.   She is here today for cerebral angiogram with stent placement     Medical History:  Reviewed    Surgical History:  Reviewed    Family History:  Reviewed    Social History:  Reviewed    Allergies:  Reviewed    Is there a contrast allergy?  No    Medications:  I have reviewed this patient's current medications.    ROS:  The 10 point Review of Systems is negative other than noted in the HPI or here.     PHYSICAL EXAMINATION  Vital Signs:  B/P: 148/78,  T: 98.1,  P: 60,  R: 16    Cardio:  RRR  Pulmonary:  no respiratory distress  Abdomen:  soft, non-tender, non-distended, bowel sounds present, obese    Neurologic  Mental Status:  fully alert, attentive and oriented, follows commands  Cranial Nerves:  visual fields intact, PERRL, EOMI with normal smooth pursuit, facial sensation intact and symmetric, facial movements symmetric, hearing not formally tested but intact to conversation  Motor:  no abnormal movements  Sensory:  intact/symmetric to light touch and pin prick throughout upper and lower extremities  Coordination:  FNF and HS intact without dysmetria    Pre-procedure National Institutes of Health Stroke Scale:   Not applicable    LABS  (most recent Cr, BUN, GFR, PLT, INR, PTT within the past 7 days):  No lab results found in last 7 days.     Platelet Function P2Y12 (PRU):  Not applicable      ASSESSMENT: 73 year old woman with right para ophthalmoc superiorly directed aneurysm 9x 10 x 11, with narrow neck and small focal outpouching along renetta medial aspect.    PLAN: Perform diagnostic  cerebral angiogram along with aneurysm treatment by placing a stent        PRE-PROCEDURE SEDATION ASSESSMENT     Pre-Procedure Sedation Assessment done at 12 Noon.    Expected Level:  Deep Sedation    Indication:  Sedation is required to allow for neurointerventional procedure.    Consent obtained from patient after discussing the risks, benefits and alternatives.     PO Intake:  Appropriately NPO for procedure    ASA Class:  Class 2 - MILD SYSTEMIC DISEASE, NO ACUTE PROBLEMS, NO FUNCTIONAL LIMITATIONS.    Mallampati:  Grade 2:  Soft palate, base of uvula, tonsillar pillars, and portion of posterior pharyngeal wall visible    History and physical reviewed and no updates needed. I have reviewed the lab findings, diagnostic data, medications, and the plan for sedation. I have determined this patient to be an appropriate candidate for the planned sedation and procedure and have reassessed the patient IMMEDIATELY PRIOR to sedation and procedure.    Patient was discussed with the Attending, Dr. Bryan, who agrees with the plan.    Lori Pena MD   Neuroendovascular Surgical Neuroradiology Fellow  Pager: [phone]

## 2023-08-18 ENCOUNTER — TELEPHONE (OUTPATIENT)
Dept: NEUROSURGERY | Facility: CLINIC | Age: 73
End: 2023-08-18
Payer: MEDICARE

## 2023-08-18 VITALS
HEART RATE: 94 BPM | TEMPERATURE: 98 F | WEIGHT: 132.28 LBS | DIASTOLIC BLOOD PRESSURE: 64 MMHG | RESPIRATION RATE: 16 BRPM | SYSTOLIC BLOOD PRESSURE: 107 MMHG | OXYGEN SATURATION: 96 % | HEIGHT: 61 IN | BODY MASS INDEX: 24.97 KG/M2

## 2023-08-18 LAB
GLUCOSE BLDC GLUCOMTR-MCNC: 107 MG/DL (ref 70–99)
GLUCOSE BLDC GLUCOMTR-MCNC: 122 MG/DL (ref 70–99)
GLUCOSE BLDC GLUCOMTR-MCNC: 154 MG/DL (ref 70–99)

## 2023-08-18 PROCEDURE — 250N000013 HC RX MED GY IP 250 OP 250 PS 637: Performed by: STUDENT IN AN ORGANIZED HEALTH CARE EDUCATION/TRAINING PROGRAM

## 2023-08-18 RX ADMIN — ASPIRIN 325 MG ORAL TABLET 325 MG: 325 PILL ORAL at 07:40

## 2023-08-18 RX ADMIN — CLOPIDOGREL BISULFATE 75 MG: 75 TABLET ORAL at 07:40

## 2023-08-18 ASSESSMENT — ACTIVITIES OF DAILY LIVING (ADL)
ADLS_ACUITY_SCORE: 25

## 2023-08-18 NOTE — PLAN OF CARE
Discharged to: home w/ daughter at 0950  Belongings: clothing sent w/ patient  AVS (After Visit Summary) discussed with: patient & daughter, Elisa. All questions answered. PIV removed.

## 2023-08-18 NOTE — PROGRESS NOTES
Admitted/transferred from: PACU  Reason for admission/transfer: Monitoring post cerebral angio  2 RN skin assessment: completed by Rupal HURLEY   Result of skin assessment and interventions/actions: Right groin access site, otherwise intact  Height, weight, drug calc weight: Done  Patient belongings (see Flowsheet) clothing, shoes, purse, phone, tablet  MDRO education added to care plan N/A  ?

## 2023-08-18 NOTE — PLAN OF CARE
Major Shift Events: Pt stable overnight. Baseline neuro status. Groin site and CMS intact. Tolerating clear liquids, will order breakfast this morning. Up to bedside commode with stand-by assist. Voids without difficulty.     Plan: Continue with plan of care. For vital signs and complete assessments, please see documentation flowsheets.     Goal Outcome Evaluation:  Plan of Care Reviewed With: patient  Overall Patient Progress: no change

## 2023-08-18 NOTE — DISCHARGE SUMMARY
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 The aneurysm was diagnosed during evaluation of cognitive decline, specifically short term memory loss.    Discharge Diagnosis:   Unruptured right internal carotid artery aneurysm now status post flow diverter stent placement./    Procedures: Endovascular flow diversion of right ICA aneurysm    Problem Leading to Hospitalization (from [facility]):   Unruptured right internal carotid artery aneurysm.     Please see H&P dated 8/17/2023 for further details about presentation.    Brief Hospital Course:   Patient was admitted to neurointensive care unit for overnight observation after procedure. She had an uneventful course. There were no new problems. Patient was tolerating oral diet and was ambulating independently. Her groin access site was dry and intact without any evidence of hematoma or tenderness. She was provided detailed explanation about her treatment and follow-up plan. She was discharged home in stable condition.     Complications: None.     Other problems addressed during this hospitalization:  None    PHYSICAL EXAMINATION  Vital Signs:  B/P: 107/64, T: 98.4, P: 94, R: 16    Mentation: Awake, alert & oriented x3  Speech: Normal. No dysarthria or aphasia  Cranial nerve exam: Pupils equal, round and reactive to light bilaterally, visual fields full, extraocular movements intact, Facial sensations intact, face symmetric, hearing normal B/L, Shoulder shrug strong B/L, tongue movements intact  Motor: Strength 5/5 throughout in right upper and bilateral lowers, decreased range of motion in the  left upper extremity with pain limited decreased flexion 4/5 and extension 4/5, 4/5 arm abduction  Sensations: Intact B/L to light touch  Coordination: Finger to nose test intact B/L  Gait: Deferred    National Institutes of Health Stroke Scale (on day of discharge)  NIHSS Total Score: 0        mRS 3    Medications    Current Discharge Medication List        CONTINUE these medications which have NOT CHANGED    Details   aspirin (ASA) 325 MG tablet Please begin taking 1 tablet 5 days prior to procedure.  Qty: 30 tablet, Refills: 3    Associated Diagnoses: Cerebral aneurysm, nonruptured      celecoxib (CELEBREX) 100 MG capsule Take 100 mg by mouth      cetirizine (ZYRTEC) 10 MG tablet Take 10 mg by mouth      Cholecalciferol (VITAMIN D) 50 MCG (2000 UT) CAPS       clopidogrel (PLAVIX) 75 MG tablet Please begin taking 1 tablet 5 days prior to procedure.  Qty: 30 tablet, Refills: 3    Associated Diagnoses: Cerebral aneurysm, nonruptured      cyanocobalamin (VITAMIN B-12) 1000 MCG tablet Take 1,000 mcg by mouth daily      donepezil (ARICEPT) 5 MG tablet Take 1 tablet by mouth daily      mirtazapine (REMERON) 7.5 MG tablet Take 1 tablet by mouth At Bedtime      Multiple Vitamin (MULTI-DAY PO) Take 1 tablet by mouth daily.      simvastatin (ZOCOR) 10 MG tablet Take 10 mg by mouth At Bedtime      CALCIUM + D OR 1500/800 daily             Additional recommendations and follow up:  - Follow-up with Dr. Bryan's clinic in 1-2 weeks  -Continue aspirin 325mg , plavix 75 mg for 3 months  -Repeat MRA head in 6 months     No discharge procedures on file.    Patient was discussed with the attending physician, Dr. Bryan  .  Tracy Doty MD  Endovascular Surgical Neuroradiology Fellow  [facility]  [phone]    Endovascular Surgical Neuroradiology staff is Dr. Bryan

## 2023-08-18 NOTE — DISCHARGE INSTRUCTIONS
What you may eat or drink after angiogram:  -- There are no changes in what you should eat or drink after this test except that you should drink at least six to eight 8-ounce glasses of fluid each day for 2 days to flush the contrast (dye) out of your body unless you are on a limited fluids diet.    Moving around after angiogram:  -- After your test: Rest 48 hours and follow the special activity instructions listed.    Special activity restrictions:  -- Limit physical activity for 48 hours.  Rest on a sofa or bed as much as possible.  -- No strenuous activity.  -- No long walks.  -- Avoid climbing stairs if possible.  If you must climb stairs, do it slowly.    Lifting:  -- Do not lift more than 10 pounds for 48 hours.   -- Do not lift more than 20 pounds for 7 days. (A full gallon of water weighs about 8 pounds)    Sexual activity:  -- You can resume sexual activity in 2 days.  Bathing/Swimming:  -- You may shower in 24 hours .  -- You may NOT take a tub bath, swim, or sit in water for 5 days.    The groin puncture site:  Care:  -- Keep the area clean and dry except for during daily shower.  -- Clean the site daily using soap and water while standing in the shower.  Pat dry thoroughly.  -- Cover the groin site with a bandaid until the skin has closed.   -- When you sneeze, cough or laugh, hold pressure on the puncture site.  -- If you must strain when having a bowel movement, hold gentle pressure to the puncture site.    Anesthesia or sedation information:  You have received medication for your procedure that made you sleepy:  -- You may be sleepy, feel less coordinated or feel weak.  To prevent injury, be careful when you walk, bathe, cook or use electrical devices/tools.  -- You may NOT drive or operate mechanical equipment until 24 hours after your procedure.  You also must stop taking narcotic pain medications before driving.  -- A responsible adult should remain with you for at least 24 hours after your  procedure.  You should stay at home and rest today.  -- This may cause you to react differently to alcohol.  Do not drink alcohol for at least 24 hours after your procedure.  -- This may cause you to not think clearly.  Do not make important decisions for 24 hours after your procedure.  -- To prevent burns, do not smoke.    Local anesthesia:  -- You had local anesthesia (numbing medicine) for your procedure.  The numbness should go away a few hours after the procedure.    Your medicines:  -- It is important that you take the medicines on your list.  Work with your health care provider or pharmacist if you have questions about your medicine.    Return to work:  -- You can return to work in 24 hours if your work does not stop you from following your care instructions (such as lifting).  If your work does not allow you to follow the instructions, you should return to work in 48 hours.    For any questions or concerns please call:  Tena Sexton at [facility] [phone] and option 1 during business hours.   Off hours: [phone]  Tracy Doty MD  Endovascular Surgical Neuroradiology Fellow  [facility]  [phone]

## 2023-08-21 ENCOUNTER — PATIENT OUTREACH (OUTPATIENT)
Dept: NEUROLOGY | Facility: CLINIC | Age: 73
End: 2023-08-21
Payer: MEDICARE

## 2023-08-21 NOTE — PROGRESS NOTES
Endovascular Surgical Neuroradiology - Post Discharge Call    Admit: 8/17/23    Discharge: 8/18/23    Facility: [facility]    MD/Service: Dr. Bryan/LUDIVINA    Pre-Procedure Diagnosis: Right internal carotid artery dorsal ophthalmic aneurysm    Post-Procedure Diagnosis:  Same     Procedure(s):   Endovascular flow diversion for cerebral aneurysm     Findings:  Right internal carotid dorsal ophthalmic artery aneurysm s/p placement of 2 overlapping [employer] flow diverters     Plan: Follow up with Dr. Bryan in 1 month. Continue DAPT x3 months, repeat MRA in 6 months and determine need for repeat angiogram at that time.    [facility] for patient letting her know I was calling from Dr. Bryan office to check in. Left my contact information and encouraged her to call back if she has any questions or concerns. Also will send a [employer] message to check in with her.     Cecilia Olivarez RN 8/21/2023 11:08 AM

## 2023-08-22 ENCOUNTER — TELEPHONE (OUTPATIENT)
Dept: NEUROSURGERY | Facility: CLINIC | Age: 73
End: 2023-08-22
Payer: MEDICARE

## 2023-08-22 NOTE — TELEPHONE ENCOUNTER
[facility] Call Center    Phone Message    May a detailed message be left on voicemail: yes     Reason for Call: Other: Patients daughter Elisa is calling to reschedule this patients appointment for 9/19. She states the the patient had some scans done and is wondering if this appointment can be done in person now, they are also looking for a few weeks later. Writer is unable to reschedule without instructions or approval of the appointment change. Please review and call back to reschedule.      Action Taken: Message routed to:  [facility] ([facility]    Travel Screening: Not Applicable

## 2023-08-24 ENCOUNTER — DOCUMENTATION ONLY (OUTPATIENT)
Dept: OTHER | Facility: CLINIC | Age: 73
End: 2023-08-24
Payer: MEDICARE

## 2023-09-05 ENCOUNTER — TELEPHONE (OUTPATIENT)
Dept: DERMATOLOGY | Facility: CLINIC | Age: 73
End: 2023-09-05
Payer: MEDICARE

## 2023-09-05 NOTE — TELEPHONE ENCOUNTER
[facility] Call Center    Phone Message    May a detailed message be left on voicemail: yes     Reason for Call: Other: Pt has surgical follow-up and needs to reschedule the appointment for October. She is available any day but the 10th.     Action Taken: Message routed to:  Other: [facility]    Travel Screening: Not Applicable

## 2023-09-13 ENCOUNTER — TELEPHONE (OUTPATIENT)
Dept: NEUROSURGERY | Facility: CLINIC | Age: 73
End: 2023-09-13

## 2023-09-13 NOTE — TELEPHONE ENCOUNTER
[facility] Call Center    Phone Message    May a detailed message be left on voicemail: yes     Reason for Call: Pt is having an MRI on 9/13/23.  They won't do the MRI without information from the implant care.  Please call Elisa back with information ASAP.  Thanks.

## 2023-09-20 ENCOUNTER — TELEPHONE (OUTPATIENT)
Dept: NEUROSURGERY | Facility: CLINIC | Age: 73
End: 2023-09-20
Payer: MEDICARE

## 2023-10-05 ENCOUNTER — ANCILLARY PROCEDURE (OUTPATIENT)
Dept: CT IMAGING | Facility: CLINIC | Age: 73
End: 2023-10-05
Attending: NEUROLOGICAL SURGERY
Payer: MEDICARE

## 2023-10-05 DIAGNOSIS — I67.1 CEREBRAL ANEURYSM, NONRUPTURED: ICD-10-CM

## 2023-10-05 LAB
CREAT BLD-MCNC: 0.6 MG/DL (ref 0.5–1)
EGFRCR SERPLBLD CKD-EPI 2021: >60 ML/MIN/1.73M2

## 2023-10-05 PROCEDURE — 82565 ASSAY OF CREATININE: CPT

## 2023-10-05 PROCEDURE — 70496 CT ANGIOGRAPHY HEAD: CPT | Mod: MG | Performed by: STUDENT IN AN ORGANIZED HEALTH CARE EDUCATION/TRAINING PROGRAM

## 2023-10-05 PROCEDURE — G1010 CDSM STANSON: HCPCS | Performed by: STUDENT IN AN ORGANIZED HEALTH CARE EDUCATION/TRAINING PROGRAM

## 2023-10-05 RX ORDER — IOPAMIDOL 755 MG/ML
70 INJECTION, SOLUTION INTRAVASCULAR ONCE
Status: COMPLETED | OUTPATIENT
Start: 2023-10-05 | End: 2023-10-05

## 2023-10-05 RX ADMIN — IOPAMIDOL 70 ML: 755 INJECTION, SOLUTION INTRAVASCULAR at 16:26

## 2023-10-06 ENCOUNTER — APPOINTMENT (OUTPATIENT)
Dept: MRI IMAGING | Facility: CLINIC | Age: 73
DRG: 038 | End: 2023-10-06
Payer: MEDICARE

## 2023-10-06 ENCOUNTER — HOSPITAL ENCOUNTER (INPATIENT)
Facility: CLINIC | Age: 73
LOS: 7 days | Discharge: HOME | DRG: 038 | End: 2023-10-13
Attending: EMERGENCY MEDICINE | Admitting: PSYCHIATRY & NEUROLOGY
Payer: MEDICARE

## 2023-10-06 DIAGNOSIS — Z86.73 HISTORY OF CVA (CEREBROVASCULAR ACCIDENT): ICD-10-CM

## 2023-10-06 DIAGNOSIS — R73.9 HYPERGLYCEMIA: Primary | ICD-10-CM

## 2023-10-06 DIAGNOSIS — I67.1 CEREBRAL ANEURYSM, NONRUPTURED: ICD-10-CM

## 2023-10-06 LAB
ALBUMIN SERPL BCG-MCNC: 3.6 G/DL (ref 3.5–5.2)
ALP SERPL-CCNC: 78 U/L (ref 35–104)
ALT SERPL W P-5'-P-CCNC: 32 U/L (ref 0–50)
ANION GAP SERPL CALCULATED.3IONS-SCNC: 9 MMOL/L (ref 7–15)
APTT PPP: 26 SECONDS (ref 22–38)
AST SERPL W P-5'-P-CCNC: 38 U/L (ref 0–45)
BASO+EOS+MONOS # BLD AUTO: NORMAL 10*3/UL
BASO+EOS+MONOS NFR BLD AUTO: NORMAL %
BASOPHILS # BLD AUTO: 0 10E3/UL (ref 0–0.2)
BASOPHILS NFR BLD AUTO: 0 %
BILIRUB DIRECT SERPL-MCNC: ABNORMAL MG/DL
BILIRUB SERPL-MCNC: <0.2 MG/DL
BUN SERPL-MCNC: 21.8 MG/DL (ref 8–23)
CALCIUM SERPL-MCNC: 9.1 MG/DL (ref 8.8–10.2)
CHLORIDE SERPL-SCNC: 107 MMOL/L (ref 98–107)
CHOLEST SERPL-MCNC: 112 MG/DL
CREAT SERPL-MCNC: 0.55 MG/DL (ref 0.51–0.95)
DEPRECATED HCO3 PLAS-SCNC: 26 MMOL/L (ref 22–29)
EGFRCR SERPLBLD CKD-EPI 2021: >90 ML/MIN/1.73M2
EOSINOPHIL # BLD AUTO: 0 10E3/UL (ref 0–0.7)
EOSINOPHIL NFR BLD AUTO: 0 %
ERYTHROCYTE [DISTWIDTH] IN BLOOD BY AUTOMATED COUNT: 12.9 % (ref 10–15)
FOLATE SERPL-MCNC: 30 NG/ML (ref 4.6–34.8)
GLUCOSE SERPL-MCNC: 99 MG/DL (ref 70–99)
HBA1C MFR BLD: 5.7 %
HCT VFR BLD AUTO: 37 % (ref 35–47)
HDLC SERPL-MCNC: 41 MG/DL
HGB BLD-MCNC: 12.2 G/DL (ref 11.7–15.7)
IMM GRANULOCYTES # BLD: 0 10E3/UL
IMM GRANULOCYTES NFR BLD: 0 %
INR PPP: 0.98 (ref 0.85–1.15)
LDLC SERPL CALC-MCNC: 59 MG/DL
LYMPHOCYTES # BLD AUTO: 1.4 10E3/UL (ref 0.8–5.3)
LYMPHOCYTES NFR BLD AUTO: 32 %
MCH RBC QN AUTO: 27.7 PG (ref 26.5–33)
MCHC RBC AUTO-ENTMCNC: 33 G/DL (ref 31.5–36.5)
MCV RBC AUTO: 84 FL (ref 78–100)
MONOCYTES # BLD AUTO: 0.4 10E3/UL (ref 0–1.3)
MONOCYTES NFR BLD AUTO: 9 %
NEUTROPHILS # BLD AUTO: 2.6 10E3/UL (ref 1.6–8.3)
NEUTROPHILS NFR BLD AUTO: 59 %
NONHDLC SERPL-MCNC: 71 MG/DL
NRBC # BLD AUTO: 0 10E3/UL
NRBC BLD AUTO-RTO: 0 /100
PA ADP BLD-ACNC: 7 PRU
PLATELET # BLD AUTO: 203 10E3/UL (ref 150–450)
POTASSIUM SERPL-SCNC: 4.3 MMOL/L (ref 3.4–5.3)
PROT SERPL-MCNC: 6.2 G/DL (ref 6.4–8.3)
RBC # BLD AUTO: 4.4 10E6/UL (ref 3.8–5.2)
SODIUM SERPL-SCNC: 142 MMOL/L (ref 135–145)
T4 FREE SERPL-MCNC: 1.6 NG/DL (ref 0.9–1.7)
TRIGL SERPL-MCNC: 59 MG/DL
TROPONIN T SERPL HS-MCNC: 12 NG/L
TSH SERPL DL<=0.005 MIU/L-ACNC: <0.01 UIU/ML (ref 0.3–4.2)
VIT B12 SERPL-MCNC: 1548 PG/ML (ref 232–1245)
WBC # BLD AUTO: 4.4 10E3/UL (ref 4–11)

## 2023-10-06 PROCEDURE — 200N000002 HC R&B ICU UMMC

## 2023-10-06 PROCEDURE — 85610 PROTHROMBIN TIME: CPT | Performed by: PHYSICIAN ASSISTANT

## 2023-10-06 PROCEDURE — 84484 ASSAY OF TROPONIN QUANT: CPT

## 2023-10-06 PROCEDURE — 70553 MRI BRAIN STEM W/O & W/DYE: CPT | Mod: MG

## 2023-10-06 PROCEDURE — C9460 INJECTION, CANGRELOR: HCPCS | Mod: JZ | Performed by: PSYCHIATRY & NEUROLOGY

## 2023-10-06 PROCEDURE — 36415 COLL VENOUS BLD VENIPUNCTURE: CPT | Performed by: PHYSICIAN ASSISTANT

## 2023-10-06 PROCEDURE — 80061 LIPID PANEL: CPT

## 2023-10-06 PROCEDURE — A9585 GADOBUTROL INJECTION: HCPCS | Mod: JZ | Performed by: EMERGENCY MEDICINE

## 2023-10-06 PROCEDURE — 82746 ASSAY OF FOLIC ACID SERUM: CPT

## 2023-10-06 PROCEDURE — 36415 COLL VENOUS BLD VENIPUNCTURE: CPT | Performed by: STUDENT IN AN ORGANIZED HEALTH CARE EDUCATION/TRAINING PROGRAM

## 2023-10-06 PROCEDURE — G1010 CDSM STANSON: HCPCS | Performed by: RADIOLOGY

## 2023-10-06 PROCEDURE — 258N000003 HC RX IP 258 OP 636: Performed by: PSYCHIATRY & NEUROLOGY

## 2023-10-06 PROCEDURE — 80053 COMPREHEN METABOLIC PANEL: CPT | Performed by: PHYSICIAN ASSISTANT

## 2023-10-06 PROCEDURE — 82607 VITAMIN B-12: CPT

## 2023-10-06 PROCEDURE — 250N000011 HC RX IP 250 OP 636: Mod: JZ | Performed by: PSYCHIATRY & NEUROLOGY

## 2023-10-06 PROCEDURE — 85730 THROMBOPLASTIN TIME PARTIAL: CPT | Performed by: PHYSICIAN ASSISTANT

## 2023-10-06 PROCEDURE — 85576 BLOOD PLATELET AGGREGATION: CPT

## 2023-10-06 PROCEDURE — 85004 AUTOMATED DIFF WBC COUNT: CPT | Performed by: PHYSICIAN ASSISTANT

## 2023-10-06 PROCEDURE — 83921 ORGANIC ACID SINGLE QUANT: CPT

## 2023-10-06 PROCEDURE — 84443 ASSAY THYROID STIM HORMONE: CPT

## 2023-10-06 PROCEDURE — 83036 HEMOGLOBIN GLYCOSYLATED A1C: CPT

## 2023-10-06 PROCEDURE — 84439 ASSAY OF FREE THYROXINE: CPT

## 2023-10-06 PROCEDURE — 70553 MRI BRAIN STEM W/O & W/DYE: CPT | Mod: 26 | Performed by: RADIOLOGY

## 2023-10-06 PROCEDURE — 250N000013 HC RX MED GY IP 250 OP 250 PS 637: Performed by: EMERGENCY MEDICINE

## 2023-10-06 PROCEDURE — 99285 EMERGENCY DEPT VISIT HI MDM: CPT | Mod: FS | Performed by: EMERGENCY MEDICINE

## 2023-10-06 PROCEDURE — 99285 EMERGENCY DEPT VISIT HI MDM: CPT | Mod: 25 | Performed by: EMERGENCY MEDICINE

## 2023-10-06 PROCEDURE — 255N000002 HC RX 255 OP 636: Mod: JZ | Performed by: EMERGENCY MEDICINE

## 2023-10-06 PROCEDURE — 250N000013 HC RX MED GY IP 250 OP 250 PS 637

## 2023-10-06 RX ORDER — MIRTAZAPINE 7.5 MG/1
7.5 TABLET, FILM COATED ORAL AT BEDTIME
Status: DISCONTINUED | OUTPATIENT
Start: 2023-10-06 | End: 2023-10-13 | Stop reason: HOSPADM

## 2023-10-06 RX ORDER — LIDOCAINE 40 MG/G
CREAM TOPICAL
Status: DISCONTINUED | OUTPATIENT
Start: 2023-10-06 | End: 2023-10-13 | Stop reason: HOSPADM

## 2023-10-06 RX ORDER — LORAZEPAM 0.5 MG/1
0.5 TABLET ORAL
Status: COMPLETED | OUTPATIENT
Start: 2023-10-06 | End: 2023-10-06

## 2023-10-06 RX ORDER — PROCHLORPERAZINE MALEATE 5 MG
5 TABLET ORAL EVERY 6 HOURS PRN
Status: DISCONTINUED | OUTPATIENT
Start: 2023-10-06 | End: 2023-10-13 | Stop reason: HOSPADM

## 2023-10-06 RX ORDER — ACETAMINOPHEN 325 MG/1
975 TABLET ORAL EVERY 6 HOURS PRN
Status: DISCONTINUED | OUTPATIENT
Start: 2023-10-06 | End: 2023-10-13 | Stop reason: HOSPADM

## 2023-10-06 RX ORDER — ONDANSETRON 2 MG/ML
4 INJECTION INTRAMUSCULAR; INTRAVENOUS EVERY 6 HOURS PRN
Status: DISCONTINUED | OUTPATIENT
Start: 2023-10-06 | End: 2023-10-13 | Stop reason: HOSPADM

## 2023-10-06 RX ORDER — SIMVASTATIN 10 MG
10 TABLET ORAL AT BEDTIME
Status: DISCONTINUED | OUTPATIENT
Start: 2023-10-06 | End: 2023-10-08

## 2023-10-06 RX ORDER — CLOPIDOGREL BISULFATE 75 MG/1
75 TABLET ORAL DAILY
Status: DISCONTINUED | OUTPATIENT
Start: 2023-10-06 | End: 2023-10-08

## 2023-10-06 RX ORDER — PROCHLORPERAZINE 25 MG
12.5 SUPPOSITORY, RECTAL RECTAL EVERY 12 HOURS PRN
Status: DISCONTINUED | OUTPATIENT
Start: 2023-10-06 | End: 2023-10-13 | Stop reason: HOSPADM

## 2023-10-06 RX ORDER — ASPIRIN 325 MG
325 TABLET ORAL DAILY
Status: DISCONTINUED | OUTPATIENT
Start: 2023-10-06 | End: 2023-10-10

## 2023-10-06 RX ORDER — GADOBUTROL 604.72 MG/ML
7.5 INJECTION INTRAVENOUS ONCE
Status: COMPLETED | OUTPATIENT
Start: 2023-10-06 | End: 2023-10-06

## 2023-10-06 RX ORDER — HYDRALAZINE HYDROCHLORIDE 20 MG/ML
10-20 INJECTION INTRAMUSCULAR; INTRAVENOUS
Status: DISCONTINUED | OUTPATIENT
Start: 2023-10-06 | End: 2023-10-13 | Stop reason: HOSPADM

## 2023-10-06 RX ORDER — LABETALOL HYDROCHLORIDE 5 MG/ML
10-20 INJECTION, SOLUTION INTRAVENOUS EVERY 10 MIN PRN
Status: DISCONTINUED | OUTPATIENT
Start: 2023-10-06 | End: 2023-10-13 | Stop reason: HOSPADM

## 2023-10-06 RX ORDER — DONEPEZIL HYDROCHLORIDE 5 MG/1
5 TABLET, FILM COATED ORAL DAILY
Status: DISCONTINUED | OUTPATIENT
Start: 2023-10-07 | End: 2023-10-08

## 2023-10-06 RX ORDER — ONDANSETRON 4 MG/1
4 TABLET, ORALLY DISINTEGRATING ORAL EVERY 6 HOURS PRN
Status: DISCONTINUED | OUTPATIENT
Start: 2023-10-06 | End: 2023-10-13 | Stop reason: HOSPADM

## 2023-10-06 RX ADMIN — LORAZEPAM 0.5 MG: 0.5 TABLET ORAL at 20:18

## 2023-10-06 RX ADMIN — SIMVASTATIN 10 MG: 10 TABLET, FILM COATED ORAL at 21:24

## 2023-10-06 RX ADMIN — CANGRELOR 2 MCG/KG/MIN: 50 INJECTION, POWDER, LYOPHILIZED, FOR SOLUTION INTRAVENOUS at 20:31

## 2023-10-06 RX ADMIN — MIRTAZAPINE 7.5 MG: 7.5 TABLET, FILM COATED ORAL at 21:24

## 2023-10-06 RX ADMIN — GADOBUTROL 7.5 ML: 604.72 INJECTION INTRAVENOUS at 21:10

## 2023-10-06 ASSESSMENT — ACTIVITIES OF DAILY LIVING (ADL)
ADLS_ACUITY_SCORE: 35

## 2023-10-06 NOTE — ED TRIAGE NOTES
73 yr old male hx dementia, CVA with left sided deficits, referred to ED by neurosurgery for stent concern.

## 2023-10-06 NOTE — ED PROVIDER NOTES
ED Provider Note  [facility]      History     Chief Complaint   Patient presents with    Neurologic Concern     HPI  72yo F pmhx HTN, HLD, asthma, dementia right ICA aneurysm s/p endovascular flow diversion w/ stent placement (Aug 2023) and subacute (9/30/23, seen at [facility]) multifocal ischemic infarcts in MCA distribution w/ associated  LUE weakness presents on referral of neuro IR for concern for stent compromise, as pt has been having ?new neuro symptoms. Denies acute symptoms today, though was seen yesterday at [facility] for concern for worsening baseline left facial droop. Had an MRI yesterday which was grossly stable and discharged. Pt's daughter spoke with neurology here, who was concerned symptoms could be 2/2 stent compromise. Pt and family deny any acute neuro symptoms at present.     Past Medical History  Past Medical History:   Diagnosis Date    Asthma, mild intermittent     Family history of colon cancer     had colonoscopy in 2008, due in 5 years    GERD (gastroesophageal reflux disease)     History of nonmelanoma skin cancer     HTN (hypertension)     Hyperlipidemia     Osteoporosis     Squamous cell carcinoma of skin, unspecified     Vitiligo      Past Surgical History:   Procedure Laterality Date    HC REMOVAL GALLBLADDER  4-1999    IR CAROTID CEREBRAL ANGIOGRAM BILATERAL  8/17/2023    SURGICAL HISTORY OF -       wrist and finger surgery for fracture     aspirin (ASA) 325 MG tablet  CALCIUM + D OR  celecoxib (CELEBREX) 100 MG capsule  cetirizine (ZYRTEC) 10 MG tablet  Cholecalciferol (VITAMIN D) 50 MCG (2000 UT) CAPS  clopidogrel (PLAVIX) 75 MG tablet  cyanocobalamin (VITAMIN B-12) 1000 MCG tablet  donepezil (ARICEPT) 5 MG tablet  mirtazapine (REMERON) 7.5 MG tablet  Multiple Vitamin (MULTI-DAY PO)  simvastatin (ZOCOR) 10 MG tablet      Allergies   Allergen Reactions    Azithromycin Hives    Erythromycin Hives    Pcn [Penicillins] Hives    Tetracycline Hives      Family History  Family History   Problem Relation Age of Onset    Asthma Mother     Diabetes Mother     Hypertension Mother     Allergies Mother     Arthritis Mother     Cancer Mother     Eye Disorder Mother     Gastrointestinal Disease Mother     Gynecology Mother     Lipids Mother     Osteoporosis Mother     Respiratory Mother     C.A.D. Father     Diabetes Father     Hypertension Father     Blood Disease Father     Cancer Father     Cardiovascular Father     Circulatory Father     Heart Disease Father     Lipids Father     Diabetes Maternal Grandmother     Heart Disease Maternal Grandmother     Osteoporosis Maternal Grandmother     Thyroid Disease Maternal Grandmother     C.A.D. Maternal Grandfather     Cardiovascular Maternal Grandfather     Cancer Paternal Grandmother     Hypertension Brother     Cardiovascular Brother     Lipids Brother     Hypertension Sister     Cancer Sister     Gastrointestinal Disease Sister     Gynecology Sister     Genitourinary Problems Sister     Lipids Sister     Obesity Sister     Asthma Daughter     Allergies Daughter     Gastrointestinal Disease Daughter     Obesity Daughter     Glaucoma No family hx of     Cerebrovascular Disease Paternal Aunt     Hypertension Brother     Connective Tissue Disorder Brother         pancreatic problem      Social History   Social History     Tobacco Use    Smoking status: Never    Smokeless tobacco: Never   Vaping Use    Vaping Use: Never used   Substance Use Topics    Alcohol use: No    Drug use: No         A medically appropriate review of systems was performed with pertinent positives and negatives noted in the HPI, and all other systems negative.    Physical Exam   BP: 138/65  Pulse: 83  Temp: 98.1  F (36.7  C)  Resp: 20  Weight: 59.9 kg (132 lb)  SpO2: 95 %  Physical Exam  Constitutional:       General: She is not in acute distress.     Appearance: Normal appearance. She is well-developed.   HENT:      Head: Normocephalic and atraumatic.    Eyes:      Conjunctiva/sclera: Conjunctivae normal.   Cardiovascular:      Rate and Rhythm: Normal rate.   Pulmonary:      Effort: Pulmonary effort is normal.   Musculoskeletal:      Cervical back: Normal range of motion and neck supple.   Skin:     General: Skin is warm and dry.      Findings: No rash.   Neurological:      Mental Status: She is alert.      Cranial Nerves: Cranial nerves 2-12 are intact.      Comments: No facial droop appreicated.   2/5 strength LUE, baseline per family   Decreases sensation LUE, baseline per family   5/5 strength remainder extremities            ED Course, Procedures, & Data      Procedures                      Results for orders placed or performed during the hospital encounter of 10/06/23   INR     Status: Normal   Result Value Ref Range    INR 0.98 0.85 - 1.15   Partial thromboplastin time     Status: Normal   Result Value Ref Range    aPTT 26 22 - 38 Seconds   CBC with platelets and differential     Status: None   Result Value Ref Range    WBC Count 4.4 4.0 - 11.0 10e3/uL    RBC Count 4.40 3.80 - 5.20 10e6/uL    Hemoglobin 12.2 11.7 - 15.7 g/dL    Hematocrit 37.0 35.0 - 47.0 %    MCV 84 78 - 100 fL    MCH 27.7 26.5 - 33.0 pg    MCHC 33.0 31.5 - 36.5 g/dL    RDW 12.9 10.0 - 15.0 %    Platelet Count 203 150 - 450 10e3/uL    % Neutrophils 59 %    % Lymphocytes 32 %    % Monocytes 9 %    Mids % (Monos, Eos, Basos)      % Eosinophils 0 %    % Basophils 0 %    % Immature Granulocytes 0 %    NRBCs per 100 WBC 0 <1 /100    Absolute Neutrophils 2.6 1.6 - 8.3 10e3/uL    Absolute Lymphocytes 1.4 0.8 - 5.3 10e3/uL    Absolute Monocytes 0.4 0.0 - 1.3 10e3/uL    Mids Abs (Monos, Eos, Basos)      Absolute Eosinophils 0.0 0.0 - 0.7 10e3/uL    Absolute Basophils 0.0 0.0 - 0.2 10e3/uL    Absolute Immature Granulocytes 0.0 <=0.4 10e3/uL    Absolute NRBCs 0.0 10e3/uL   CBC with platelets differential     Status: None    Narrative    The following orders were created for panel order CBC with  platelets differential.  Procedure                               Abnormality         Status                     ---------                               -----------         ------                     CBC with platelets and d...[[id number]]                      Final result                 Please view results for these tests on the individual orders.     Medications - No data to display  Labs Ordered and Resulted from Time of ED Arrival to Time of ED Departure   INR - Normal       Result Value    INR 0.98     PARTIAL THROMBOPLASTIN TIME - Normal    aPTT 26     CBC WITH PLATELETS AND DIFFERENTIAL    WBC Count 4.4      RBC Count 4.40      Hemoglobin 12.2      Hematocrit 37.0      MCV 84      MCH 27.7      MCHC 33.0      RDW 12.9      Platelet Count 203      % Neutrophils 59      % Lymphocytes 32      % Monocytes 9      Mids % (Monos, Eos, Basos)        % Eosinophils 0      % Basophils 0      % Immature Granulocytes 0      NRBCs per 100 WBC 0      Absolute Neutrophils 2.6      Absolute Lymphocytes 1.4      Absolute Monocytes 0.4      Mids Abs (Monos, Eos, Basos)        Absolute Eosinophils 0.0      Absolute Basophils 0.0      Absolute Immature Granulocytes 0.0      Absolute NRBCs 0.0     BASIC METABOLIC PANEL     No orders to display          Critical care was not performed.     Medical Decision Making  The patient's presentation was of high complexity (a chronic illness severe exacerbation, progression, or side effect of treatment).    The patient's evaluation involved:  review of external note(s) from 2 sources ([facility] ED and discharge summary)  review of 1 test result(s) ordered prior to this encounter (MRI)  ordering and/or review of 1 test(s) in this encounter (labs)  discussion of management or test interpretation with another health professional (Neuro IR and stroke neuro)    The patient's management necessitated high risk (a decision regarding hospitalization).    Assessment & Plan    74yo F pmhx HTN, HLD, asthma,  dementia right ICA aneurysm s/p endovascular flow diversion w/ stent placement (Aug 2023) and subacute (9/30/23, seen at [facility]) multifocal ischemic infarcts in MCA distribution w/ associated  LUE weakness presents on referral of neuro IR for concern for stent compromise, as pt has been having ?new neuro symptoms. Denies acute symptoms today, though was seen yesterday at [facility] for concern for worsening baseline left facial droop. Had an MRI yesterday which was grossly stable and discharged.     In ED, HDS/AF, well appearing, at neurologic baseline per family (LUE weakness and decreased sensation).     Discussed with neuro IR. Plan diagnostic angiogram later today or tomorrow. Discussed with neuro/stroke service who will admit pt. General admission/pre-procedure labs sent.     I have reviewed the nursing notes. I have reviewed the findings, diagnosis, plan and need for follow up with the patient.      --    ED Attending Physician Attestation    I Godfrey Solano MD, cared for this patient with the Advanced Practice Provider (DAKOTA). I have performed a history and physical examination of the patient independent of the DAKOTA. I reviewed the DAKOTA's documentation above and agree with the documented findings and plan of care. I personally provided a substantive portion of the care for this patient, including the complete Medical Decision Making. Please see the DAKOTA's documentation for full details.    Summary of HPI, PE, ED Course   Patient is a 73 year old female evaluated in the emergency department for evaluation by neuro interventional radiology.  Patient with recent MCA distribution strokelike symptoms in the setting of prior ICA stent.  Evaluated outside facility.  Presented today due to concern of possible need for stent revision or further evaluation.  Exam and ED course notable for patient to be alert, oriented and interactive.  No sign of acute neurovascular deficit.  Case discussed with stroke neurology  service will admit patient primarily in consult with neuro interventional radiology for further evaluation.  After the completion of care in the emergency department, the patient was admitted to inpatient.        Godfrey Solano MD  Emergency Medicine      New Prescriptions    No medications on file       Final diagnoses:   Cerebral aneurysm, nonruptured   History of CVA (cerebrovascular accident)         Eddie Cali PA-C  [facility] EMERGENCY DEPARTMENT  10/6/2023     Eddie Cali PA-C  10/06/23 1527       Godfrey Solano MD  10/06/23 [id number]

## 2023-10-06 NOTE — LETTER
Transition Communication Hand-off for Care Transitions to Next Level of Care Provider    Name: Crys Gama  : 1950  MRN #: [id number]  Primary Care Provider: Roxanna Hernandez  Primary Care MD Name: Dr. Roxanna Hernandez  Primary Clinic: [address] [place] [address] [facility]  [facility] [address]     Reason for Hospitalization:  Cerebral aneurysm, nonruptured [I67.1]  History of CVA (cerebrovascular accident) [Z86.73]  Admit Date/Time: 10/6/2023  2:37 PM  Discharge Date: 10/13/23  Payor Source: Payor: MEDICARE / Plan: MEDICARE / Product Type: Medicare /              Reason for Communication Hand-off Referral: Other continuity of care    Discharge Plan: discharge today with outpatient therapies       Concern for non-adherence with plan of care:   Y/N no  Discharge Needs Assessment:  Needs      Flowsheet Row Most Recent Value   Equipment Currently Used at Home grab bar, toilet, grab bar, tub/shower, shower chair          Follow-up specialty is recommended: Yes    Follow-up plan:    Future Appointments   Date Time Provider Department Center   10/13/2023  4:15 PM Braden Jarrett, OT [facility] O   10/14/2023  7:00 PM Arelis Motley PT [facility] O   2023  9:00 AM Xochitl Bryan MD [facility]       Any outstanding tests or procedures:        Referrals       Future Labs/Procedures    Occupational Therapy Referral     Process Instructions:    Work Related Injury: Functional Capacity and Work Conditioning are only offered at [facility] and [facility] (service can be provided by PT or OT).    *This therapy referral will be filtered to a centralized scheduling office at [facility] and the patient will receive a call to schedule an appointment at a [place] location most convenient for them. *    Comments:    Please be aware that coverage of these services is subject to the terms and limitations of your health insurance plan.  Call member services at your health plan  with any benefit or coverage questions.  If you have not heard from the scheduling office within 2 business days, please call [phone] for [facility], [phone] for [place] and [phone] for [facility].    Physical Therapy Referral     Process Instructions:    Work Related Injury: Functional Capacity and Work Conditioning are only offered at [facility] and [facility] (service can be provided by PT or OT).    *This therapy referral will be filtered to a centralized scheduling office at [facility] and the patient will receive a call to schedule an appointment at a [place] location most convenient for them. *    Comments:    Please be aware that coverage of these services is subject to the terms and limitations of your health insurance plan.  Call member services at your health plan with any benefit or coverage questions.  If you have not heard from the scheduling office within 2 business days, please call [phone] for [facility], [phone] for [place] and [phone] for [facility].    Speech Therapy Referral     Process Instructions:    *This therapy referral will be filtered to a centralized scheduling office at [facility] and the patient will receive a call to schedule an appointment at a [place] location most convenient for them. *    Comments:    Please be aware that coverage of these services is subject to the terms and limitations of your health insurance plan.  Call member services at your health plan with any benefit or coverage questions.  If you have not heard from the scheduling office within 2 business days, please call [phone] for [facility], [phone] for [place] and [phone] for [place].            Supplies       Future Labs/Procedures    ALCOHOL WIPES PER BOX             Key Recommendations:  please see attached AVS    Lexi Chauhan RN    AVS/Discharge Summary is the source of truth; this is a  helpful guide for improved communication of patient story

## 2023-10-06 NOTE — H&P
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    History obtained by daughter per patient's request given her history of dementia.    During May 2023, the patient was diagnosed with Alzheimer's disease.  As part of the work-up, brain imaging revealed the right ICA aneurysm.  Patient has been dealing with progressive memory loss for the past 6 years.  No hallucinations. Endorses gradual decline in ability to find the exact words she's thinking.    She had an elective stent placed in the aneurysm 8/17/23    Around Labor Day, the patient started to progressively developed weakness in her left arm. Since then, she has received multiple brain images that revealed multifocal right MCA territory infarcts.  2 weeks ago, neighbor noticed onset of a left facial droop. 4 days ago, she developed difficulty seeing from the periphery of her L eye - this symptoms has fluctuated in severity (was present earlier today with neuro-IR provider's exam but resolved on my exam). Yesterday, daughter states that patient was particularly feeling unwell.  She was very weak in her left arm and her gait has significantly slowed.  She also appeared to be more confused, with the inability to figure out how to put her shirt over her head (complicated by L arm weakness).     Patient's gait is now extremely abnormal.  2 weeks ago, patient was ambulating independently without difficulty.  With the ability to walk more than 10,000 steps a day.  However, now the patient  walks very slowly and is very cautious with her steps.    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 Initial NIHSS 4 in setting of answering month incorrectly, mild L facial droop, mild L arm drift with associated decrease in sensation.    #R ICA aneurysm  #Multifocal R MCA territory strokes   -Admit to stroke neurology  -Diagnostic cerebral angiogram with an intention to plasty planned for 10/7 at 0900  -P2Y12 level pending  -Cangrelor gtt overnight - to be managed by Neuro-IR  -Continue for at least for 2 hours or longer when able to switch to oral therapy.   -Hold all other antiplatelets (PTA aspirin 325 mg daily and PTA plavix 75 mg daily)  - Neurochecks Q 4 hours  - BP goal < 180 / 105  - Avoid hypotonic IV fluids  - Statin: PTA simvastatin 10 mg daily  - MRI brain with and without contrast   - Telemetry, EKG  - Bedside Glucose Monitoring  - Euthermia, Euglycemia    #HLD   -PTA simavastatin 10 mg daily    #Hx depression  -PTA mirtazapine 7.5 mg at bedtime         Prophylaxis            For VTE Prevention:  - Hold DVT ppx for Neuro-IR procedure in AM    For Acid Suppression:  - GI prophylaxis is not indicated    Diet: Regular, thin liquids. NPO at midnight     Code Status  Not discussed yet    During initial physical assessment, the plan of care was discussed and developed with patient, spouse, and daughter.  Plan of care includes: plan per above .    Patient was admitted via [facility]    The  patient will be admitted to the Stroke team..     Araceli Vines MD  PGY-3 Neurology Resident   ___________________________________________________    Nutrition:   Orders Placed This Encounter      NPO per Anesthesia Guidelines for Procedure/Surgery Except for: Meds    Clinically Significant Risk Factors Present on Admission                # Drug Induced Platelet Defect: home medication list includes an antiplatelet medication   # Hypertension: Noted on problem list          # Asthma: noted on problem list       Past Medical History   Past Medical History:   Diagnosis Date    Asthma, mild intermittent     Family history of colon cancer     had colonoscopy in 2008, due in 5 years    GERD (gastroesophageal reflux disease)     History of nonmelanoma skin cancer     HTN (hypertension)     Hyperlipidemia     Osteoporosis     Squamous cell carcinoma of skin, unspecified     Vitiligo      Past Surgical History   Past Surgical History:   Procedure Laterality Date    HC REMOVAL GALLBLADDER  4-1999    IR CAROTID CEREBRAL ANGIOGRAM BILATERAL  8/17/2023    SURGICAL HISTORY OF -       wrist and finger surgery for fracture     Medications   Home Meds  Prior to Admission medications    Medication Sig Start Date End Date Taking? Authorizing Provider   aspirin (ASA) 325 MG tablet Please begin taking 1 tablet 5 days prior to procedure. 7/27/23   Xochitl Bryan MD   CALCIUM + D OR 1500/800 daily    Reported, Patient   celecoxib (CELEBREX) 100 MG capsule Take 100 mg by mouth 7/11/23   Reported, Patient   cetirizine (ZYRTEC) 10 MG tablet Take 10 mg by mouth    Reported, Patient   Cholecalciferol (VITAMIN D) 50 MCG (2000 UT) CAPS     Reported, Patient   clopidogrel (PLAVIX) 75 MG tablet Please begin taking 1 tablet 5 days prior to procedure. 7/27/23   Xochitl Bryan MD   cyanocobalamin (VITAMIN B-12) 1000 MCG tablet Take 1,000 mcg by mouth daily    Reported, Patient   donepezil (ARICEPT) 5 MG tablet Take 1 tablet  by mouth daily 7/11/23   Reported, Patient   mirtazapine (REMERON) 7.5 MG tablet Take 1 tablet by mouth At Bedtime 5/11/23   Reported, Patient   Multiple Vitamin (MULTI-DAY PO) Take 1 tablet by mouth daily.    Reported, Patient   simvastatin (ZOCOR) 10 MG tablet Take 10 mg by mouth At Bedtime    Reported, Patient       Scheduled Meds      Infusion Meds      PRN Meds      Allergies   Allergies   Allergen Reactions    Azithromycin Hives    Erythromycin Hives    Pcn [Penicillins] Hives    Tetracycline Hives     Family History   Family History   Problem Relation Age of Onset    Asthma Mother     Diabetes Mother     Hypertension Mother     Allergies Mother     Arthritis Mother     Cancer Mother     Eye Disorder Mother     Gastrointestinal Disease Mother     Gynecology Mother     Lipids Mother     Osteoporosis Mother     Respiratory Mother     C.A.D. Father     Diabetes Father     Hypertension Father     Blood Disease Father     Cancer Father     Cardiovascular Father     Circulatory Father     Heart Disease Father     Lipids Father     Diabetes Maternal Grandmother     Heart Disease Maternal Grandmother     Osteoporosis Maternal Grandmother     Thyroid Disease Maternal Grandmother     C.A.D. Maternal Grandfather     Cardiovascular Maternal Grandfather     Cancer Paternal Grandmother     Hypertension Brother     Cardiovascular Brother     Lipids Brother     Hypertension Sister     Cancer Sister     Gastrointestinal Disease Sister     Gynecology Sister     Genitourinary Problems Sister     Lipids Sister     Obesity Sister     Asthma Daughter     Allergies Daughter     Gastrointestinal Disease Daughter     Obesity Daughter     Glaucoma No family hx of     Cerebrovascular Disease Paternal Aunt     Hypertension Brother     Connective Tissue Disorder Brother         pancreatic problem      Social History   Social History     Tobacco Use    Smoking status: Never    Smokeless tobacco: Never   Vaping Use    Vaping Use: Never used    Substance Use Topics    Alcohol use: No    Drug use: No       Review of Systems   The 10 point Review of Systems is negative other than noted in the HPI or here.        PHYSICAL EXAMINATION  Temp:  [98.1  F (36.7  C)] 98.1  F (36.7  C)  Pulse:  [83] 83  Resp:  [20] 20  BP: (138)/(65) 138/65  SpO2:  [95 %] 95 %    Physical Examination   Vitals: /65   Pulse 83   Temp 98.1  F (36.7  C) (Oral)   Resp 20   Wt 59.9 kg (132 lb)   SpO2 95%   BMI 24.94 kg/m    General: Lying in bed, NAD  Head: Normocephalic/atraumatic  Eyes: no icterus  Cardiac: Regular rate per vitals  Pulmonary: non-labored, breathing comfortably on room air  GI: non-distended  Skin: No rash or lesion on exposed skin  Extremities: Extremities warm, no edema   Psych: Mood pleasant, affect congruent    Neuro:  Mental status: Awake, alert, attentive, oriented to self, age, place (city, state, location), year but not month. Language is fluent and coherent with intact comprehension, naming and repetition.  Cranial nerves: PERRL, conjugate gaze, EOMI, facial sensation intact to light touch, Visual fields intact to both finger counting and wiggling, mild L facial droop, shoulder shrug strong, tongue midline, no dysarthria.   Motor: Normal tone. No abnormal movements. No drift in all extremities except mild L arm drift without hitting bed.    Right Left   Shoulder abduction:        5 4-   Elbow extension: 5 4   Elbow flexion:            5 4   Wrist flexion:            Not tested 0   Wrist extension:        Not tested 2   Finger extension Not tested 0   Finger flexion Not tested  0   Hip flexion 4+ 4+   Knee flexion 5 5   Knee extension 5 5   Dorsiflexion 5 5   Plantar flexion 5 5      Reflexes: Positive Cosby in L hand, negative on the right, no clonus, toes down-going.  Sensory: Intact to light touch in all 4 extremities. Decreased sensation to pinprick in L hand up to the wrist.   Coordination: R FNF and b/l Heel to shin without ataxia or  dysmetria.   Gait: Slightly wide width, slow and cautious stride, symmetric arm swing, 2-3 steps to turn but very slow.    Dysphagia Screen  Per Nursing    Stroke Scales    NIHSS  1a. Level of Consciousness 0-->Alert, keenly responsive   1b. LOC Questions 1-->Answers one question correctly   1c. LOC Commands 0-->Performs both tasks correctly   2.   Best Gaze 0-->Normal   3.   Visual 0-->No visual loss   4.   Facial Palsy 1-->Minor paralysis (flattened nasolabial fold, asymmetry on smiling)   5a. Motor Arm, Left 1-->Drift, limb holds 90 (or 45) degrees, but drifts down before full 10 seconds, does not hit bed or other support   5b. Motor Arm, Right 0-->No drift, limb holds 90 (or 45) degrees for full 10 secs   6a. Motor Leg, Left 0-->No drift, leg holds 30 degree position for full 5 secs   6b. Motor Leg, right 0-->No drift, leg holds 30 degree position for full 5 secs   7.   Limb Ataxia 0-->Absent   8.   Sensory 1-->Mild-to-moderate sensory loss, patient feels pinprick is less sharp or is dull on the affected side, or there is a loss of superficial pain with pinprick, but patient is aware of being touched   9.   Best Language 0-->No aphasia, normal   10. Dysarthria 0-->Normal   11. Extinction and Inattention  0-->No abnormality   Total 4 (10/06/23 2027)       Modified Ford Score (Pre-morbid)  3 - Moderate disability.  Requires some help, but able to walk unassisted.    Imaging  I personally reviewed all imaging; relevant findings per the HPI.    Lab Results Data   CBC  Recent Labs   Lab 10/06/23  1458   WBC 4.4   RBC 4.40   HGB 12.2   HCT 37.0        Basic Metabolic Panel   Recent Labs   Lab 10/06/23  1458 10/05/23  1516     --    POTASSIUM 4.3  --    CHLORIDE 107  --    CO2 26  --    BUN 21.8  --    CR 0.55 0.6   GLC 99  --    FRANK 9.1  --      Liver Panel  No results for input(s): PROTTOTAL, ALBUMIN, BILITOTAL, ALKPHOS, AST, ALT, BILIDIRECT in the last 168 hours.  INR    Recent Labs   Lab Test  10/06/23  1458   INR 0.98      Lipid Profile  No lab results found.  A1C  No lab results found.  Troponin  No results for input(s): CTROPT, TROPONINIS, TROPONINI, GHTROP in the last 168 hours.       Stroke Code / Stroke Consult Data Data    Not a stroke code

## 2023-10-06 NOTE — PROGRESS NOTES
Brief Clinical Note:    Crys Gama is a 73 year old female who presents with left sided weakness, numbness and vision loss that has been progressing since Labor day weekend. She first had left arm numbness and weakness then developed left facial droop and the vision loss developed later. She had a flow diverter stent placed for a right internal carotid artery aneurysm on 8/17/23. Concern for thrombus formation on the stent with resulting stroke.     Plan:   -Diagnostic cerebral angiogram with an intention to plasty   -P2Y12 level  -Cangrelor gtt  overnight   Cangrelor Bolus + Infusion    Cangrelor 30 mcg/kg IV bolus followed immediately by a 2 mcg/kg/min IV infusion.   Continue for at least for 2 hours or longer when able to switch to oral therapy.     - Hold other antiplatelets.   -NPO at midnight     Tracy Doty MD  Endovascular Surgical Neuroradiology Fellow  [facility]  [phone]    Endovascular Surgical Neuroradiology staff is Dr. Johnson

## 2023-10-07 ENCOUNTER — APPOINTMENT (OUTPATIENT)
Dept: OCCUPATIONAL THERAPY | Facility: CLINIC | Age: 73
DRG: 038 | End: 2023-10-07
Payer: MEDICARE

## 2023-10-07 ENCOUNTER — APPOINTMENT (OUTPATIENT)
Dept: INTERVENTIONAL RADIOLOGY/VASCULAR | Facility: CLINIC | Age: 73
DRG: 038 | End: 2023-10-07
Payer: MEDICARE

## 2023-10-07 ENCOUNTER — APPOINTMENT (OUTPATIENT)
Dept: PHYSICAL THERAPY | Facility: CLINIC | Age: 73
DRG: 038 | End: 2023-10-07
Payer: MEDICARE

## 2023-10-07 LAB
ANION GAP SERPL CALCULATED.3IONS-SCNC: 9 MMOL/L (ref 7–15)
ATRIAL RATE - MUSE: 83 BPM
BUN SERPL-MCNC: 13.7 MG/DL (ref 8–23)
CALCIUM SERPL-MCNC: 8.7 MG/DL (ref 8.8–10.2)
CHLORIDE SERPL-SCNC: 108 MMOL/L (ref 98–107)
CREAT SERPL-MCNC: 0.48 MG/DL (ref 0.51–0.95)
DEPRECATED HCO3 PLAS-SCNC: 25 MMOL/L (ref 22–29)
DIASTOLIC BLOOD PRESSURE - MUSE: NORMAL MMHG
EGFRCR SERPLBLD CKD-EPI 2021: >90 ML/MIN/1.73M2
ERYTHROCYTE [DISTWIDTH] IN BLOOD BY AUTOMATED COUNT: 13 % (ref 10–15)
GLUCOSE BLDC GLUCOMTR-MCNC: 100 MG/DL (ref 70–99)
GLUCOSE SERPL-MCNC: 112 MG/DL (ref 70–99)
HCT VFR BLD AUTO: 35 % (ref 35–47)
HGB BLD-MCNC: 11.7 G/DL (ref 11.7–15.7)
INTERPRETATION ECG - MUSE: NORMAL
MCH RBC QN AUTO: 27.5 PG (ref 26.5–33)
MCHC RBC AUTO-ENTMCNC: 33.4 G/DL (ref 31.5–36.5)
MCV RBC AUTO: 82 FL (ref 78–100)
P AXIS - MUSE: 74 DEGREES
PLATELET # BLD AUTO: 187 10E3/UL (ref 150–450)
POTASSIUM SERPL-SCNC: 4 MMOL/L (ref 3.4–5.3)
PR INTERVAL - MUSE: 128 MS
QRS DURATION - MUSE: 66 MS
QT - MUSE: 350 MS
QTC - MUSE: 411 MS
R AXIS - MUSE: 23 DEGREES
RBC # BLD AUTO: 4.25 10E6/UL (ref 3.8–5.2)
SODIUM SERPL-SCNC: 142 MMOL/L (ref 135–145)
SYSTOLIC BLOOD PRESSURE - MUSE: NORMAL MMHG
T AXIS - MUSE: 58 DEGREES
VENTRICULAR RATE- MUSE: 83 BPM
WBC # BLD AUTO: 4.7 10E3/UL (ref 4–11)

## 2023-10-07 PROCEDURE — C2628 CATHETER, OCCLUSION: HCPCS

## 2023-10-07 PROCEDURE — C9460 INJECTION, CANGRELOR: HCPCS | Mod: JZ | Performed by: PSYCHIATRY & NEUROLOGY

## 2023-10-07 PROCEDURE — 97161 PT EVAL LOW COMPLEX 20 MIN: CPT | Mod: GP

## 2023-10-07 PROCEDURE — 200N000002 HC R&B ICU UMMC

## 2023-10-07 PROCEDURE — 250N000011 HC RX IP 250 OP 636: Mod: JZ

## 2023-10-07 PROCEDURE — C1725 CATH, TRANSLUMIN NON-LASER: HCPCS

## 2023-10-07 PROCEDURE — 97760 ORTHOTIC MGMT&TRAING 1ST ENC: CPT | Mod: GO | Performed by: OCCUPATIONAL THERAPIST

## 2023-10-07 PROCEDURE — 250N000011 HC RX IP 250 OP 636: Performed by: STUDENT IN AN ORGANIZED HEALTH CARE EDUCATION/TRAINING PROGRAM

## 2023-10-07 PROCEDURE — 87040 BLOOD CULTURE FOR BACTERIA: CPT

## 2023-10-07 PROCEDURE — C1887 CATHETER, GUIDING: HCPCS

## 2023-10-07 PROCEDURE — 258N000003 HC RX IP 258 OP 636: Performed by: PSYCHIATRY & NEUROLOGY

## 2023-10-07 PROCEDURE — 80048 BASIC METABOLIC PNL TOTAL CA: CPT

## 2023-10-07 PROCEDURE — 36415 COLL VENOUS BLD VENIPUNCTURE: CPT

## 2023-10-07 PROCEDURE — 272N000506 HC NEEDLE CR6

## 2023-10-07 PROCEDURE — 61630 BALO ANGIOPLASTY ICR PERQ: CPT | Mod: GC | Performed by: RADIOLOGY

## 2023-10-07 PROCEDURE — 99152 MOD SED SAME PHYS/QHP 5/>YRS: CPT

## 2023-10-07 PROCEDURE — 250N000012 HC RX MED GY IP 250 OP 636 PS 637: Performed by: STUDENT IN AN ORGANIZED HEALTH CARE EDUCATION/TRAINING PROGRAM

## 2023-10-07 PROCEDURE — 82962 GLUCOSE BLOOD TEST: CPT

## 2023-10-07 PROCEDURE — C9460 INJECTION, CANGRELOR: HCPCS | Mod: JZ

## 2023-10-07 PROCEDURE — 250N000009 HC RX 250: Performed by: STUDENT IN AN ORGANIZED HEALTH CARE EDUCATION/TRAINING PROGRAM

## 2023-10-07 PROCEDURE — 258N000003 HC RX IP 258 OP 636

## 2023-10-07 PROCEDURE — 272N000116 HC CATH CR1

## 2023-10-07 PROCEDURE — 255N000002 HC RX 255 OP 636: Performed by: RADIOLOGY

## 2023-10-07 PROCEDURE — 61630 BALO ANGIOPLASTY ICR PERQ: CPT

## 2023-10-07 PROCEDURE — 999N000127 HC STATISTIC PERIPHERAL IV START W US GUIDANCE

## 2023-10-07 PROCEDURE — C1760 CLOSURE DEV, VASC: HCPCS

## 2023-10-07 PROCEDURE — 97110 THERAPEUTIC EXERCISES: CPT | Mod: GP

## 2023-10-07 PROCEDURE — 97530 THERAPEUTIC ACTIVITIES: CPT | Mod: GP

## 2023-10-07 PROCEDURE — 250N000011 HC RX IP 250 OP 636: Mod: JZ | Performed by: PSYCHIATRY & NEUROLOGY

## 2023-10-07 PROCEDURE — 99152 MOD SED SAME PHYS/QHP 5/>YRS: CPT | Mod: GC | Performed by: RADIOLOGY

## 2023-10-07 PROCEDURE — 272N000566 HC SHEATH CR3

## 2023-10-07 PROCEDURE — 36224 PLACE CATH CAROTD ART: CPT | Mod: RT

## 2023-10-07 PROCEDURE — C1769 GUIDE WIRE: HCPCS

## 2023-10-07 PROCEDURE — 97165 OT EVAL LOW COMPLEX 30 MIN: CPT | Mod: GO | Performed by: OCCUPATIONAL THERAPIST

## 2023-10-07 PROCEDURE — 250N000013 HC RX MED GY IP 250 OP 250 PS 637: Performed by: STUDENT IN AN ORGANIZED HEALTH CARE EDUCATION/TRAINING PROGRAM

## 2023-10-07 PROCEDURE — 250N000013 HC RX MED GY IP 250 OP 250 PS 637

## 2023-10-07 PROCEDURE — 272N000192 HC ACCESSORY CR2

## 2023-10-07 PROCEDURE — 85027 COMPLETE CBC AUTOMATED: CPT

## 2023-10-07 PROCEDURE — 97535 SELF CARE MNGMENT TRAINING: CPT | Mod: GO | Performed by: OCCUPATIONAL THERAPIST

## 2023-10-07 RX ORDER — DEXAMETHASONE 2 MG/1
2 TABLET ORAL 2 TIMES DAILY
Status: DISCONTINUED | OUTPATIENT
Start: 2023-10-10 | End: 2023-10-10

## 2023-10-07 RX ORDER — HEPARIN SODIUM 1000 [USP'U]/ML
500-6000 INJECTION, SOLUTION INTRAVENOUS; SUBCUTANEOUS
Status: COMPLETED | OUTPATIENT
Start: 2023-10-07 | End: 2023-10-07

## 2023-10-07 RX ORDER — HEPARIN SODIUM 200 [USP'U]/100ML
1 INJECTION, SOLUTION INTRAVENOUS CONTINUOUS PRN
Status: DISCONTINUED | OUTPATIENT
Start: 2023-10-07 | End: 2023-10-07 | Stop reason: HOSPADM

## 2023-10-07 RX ORDER — DEXAMETHASONE 0.5 MG/1
1 TABLET ORAL DAILY
Status: DISCONTINUED | OUTPATIENT
Start: 2023-10-14 | End: 2023-10-10

## 2023-10-07 RX ORDER — FLUMAZENIL 0.1 MG/ML
0.2 INJECTION, SOLUTION INTRAVENOUS
Status: DISCONTINUED | OUTPATIENT
Start: 2023-10-07 | End: 2023-10-07 | Stop reason: HOSPADM

## 2023-10-07 RX ORDER — LIDOCAINE 40 MG/G
CREAM TOPICAL
Status: DISCONTINUED | OUTPATIENT
Start: 2023-10-07 | End: 2023-10-07 | Stop reason: HOSPADM

## 2023-10-07 RX ORDER — NALOXONE HYDROCHLORIDE 0.4 MG/ML
0.2 INJECTION, SOLUTION INTRAMUSCULAR; INTRAVENOUS; SUBCUTANEOUS
Status: DISCONTINUED | OUTPATIENT
Start: 2023-10-07 | End: 2023-10-07 | Stop reason: HOSPADM

## 2023-10-07 RX ORDER — FENTANYL CITRATE 50 UG/ML
25-50 INJECTION, SOLUTION INTRAMUSCULAR; INTRAVENOUS EVERY 5 MIN PRN
Status: DISCONTINUED | OUTPATIENT
Start: 2023-10-07 | End: 2023-10-07 | Stop reason: HOSPADM

## 2023-10-07 RX ORDER — SODIUM CHLORIDE 9 MG/ML
INJECTION, SOLUTION INTRAVENOUS CONTINUOUS
Status: DISCONTINUED | OUTPATIENT
Start: 2023-10-07 | End: 2023-10-07 | Stop reason: HOSPADM

## 2023-10-07 RX ORDER — NALOXONE HYDROCHLORIDE 0.4 MG/ML
0.4 INJECTION, SOLUTION INTRAMUSCULAR; INTRAVENOUS; SUBCUTANEOUS
Status: DISCONTINUED | OUTPATIENT
Start: 2023-10-07 | End: 2023-10-07 | Stop reason: HOSPADM

## 2023-10-07 RX ORDER — IODIXANOL 320 MG/ML
100 INJECTION, SOLUTION INTRAVASCULAR ONCE
Status: COMPLETED | OUTPATIENT
Start: 2023-10-07 | End: 2023-10-07

## 2023-10-07 RX ORDER — DEXAMETHASONE 0.5 MG/1
1 TABLET ORAL 2 TIMES DAILY
Status: DISCONTINUED | OUTPATIENT
Start: 2023-10-12 | End: 2023-10-10

## 2023-10-07 RX ORDER — DEXAMETHASONE 4 MG/1
4 TABLET ORAL EVERY 6 HOURS SCHEDULED
Status: DISCONTINUED | OUTPATIENT
Start: 2023-10-07 | End: 2023-10-10

## 2023-10-07 RX ORDER — METHYLPREDNISOLONE SODIUM SUCCINATE 125 MG/2ML
125 INJECTION, POWDER, LYOPHILIZED, FOR SOLUTION INTRAMUSCULAR; INTRAVENOUS ONCE
Status: COMPLETED | OUTPATIENT
Start: 2023-10-07 | End: 2023-10-07

## 2023-10-07 RX ORDER — CLOPIDOGREL BISULFATE 75 MG/1
75 TABLET ORAL DAILY
Status: DISCONTINUED | OUTPATIENT
Start: 2023-10-07 | End: 2023-10-10

## 2023-10-07 RX ADMIN — SIMVASTATIN 10 MG: 10 TABLET, FILM COATED ORAL at 21:26

## 2023-10-07 RX ADMIN — DEXAMETHASONE 4 MG: 4 TABLET ORAL at 17:09

## 2023-10-07 RX ADMIN — DONEPEZIL HYDROCHLORIDE 5 MG: 5 TABLET, FILM COATED ORAL at 07:44

## 2023-10-07 RX ADMIN — MIDAZOLAM 0.5 MG: 1 INJECTION INTRAMUSCULAR; INTRAVENOUS at 13:03

## 2023-10-07 RX ADMIN — MIDAZOLAM 0.5 MG: 1 INJECTION INTRAMUSCULAR; INTRAVENOUS at 14:25

## 2023-10-07 RX ADMIN — CANGRELOR 0.2 MCG/KG/MIN: 50 INJECTION, POWDER, LYOPHILIZED, FOR SOLUTION INTRAVENOUS at 15:11

## 2023-10-07 RX ADMIN — CANGRELOR 2 MCG/KG/MIN: 50 INJECTION, POWDER, LYOPHILIZED, FOR SOLUTION INTRAVENOUS at 05:50

## 2023-10-07 RX ADMIN — FENTANYL CITRATE 25 MCG: 50 INJECTION, SOLUTION INTRAMUSCULAR; INTRAVENOUS at 14:25

## 2023-10-07 RX ADMIN — LIDOCAINE HYDROCHLORIDE 10 ML: 10 INJECTION, SOLUTION EPIDURAL; INFILTRATION; INTRACAUDAL; PERINEURAL at 13:34

## 2023-10-07 RX ADMIN — CANGRELOR 0.2 MCG/KG/MIN: 50 INJECTION, POWDER, LYOPHILIZED, FOR SOLUTION INTRAVENOUS at 11:09

## 2023-10-07 RX ADMIN — MIDAZOLAM 1 MG: 1 INJECTION INTRAMUSCULAR; INTRAVENOUS at 13:10

## 2023-10-07 RX ADMIN — FENTANYL CITRATE 25 MCG: 50 INJECTION, SOLUTION INTRAMUSCULAR; INTRAVENOUS at 13:03

## 2023-10-07 RX ADMIN — HEPARIN SODIUM 5 BAG: 200 INJECTION, SOLUTION INTRAVENOUS at 13:34

## 2023-10-07 RX ADMIN — CLOPIDOGREL BISULFATE 75 MG: 75 TABLET ORAL at 16:43

## 2023-10-07 RX ADMIN — HEPARIN SODIUM 2000 UNITS: 1000 INJECTION INTRAVENOUS; SUBCUTANEOUS at 13:10

## 2023-10-07 RX ADMIN — METHYLPREDNISOLONE SODIUM SUCCINATE 125 MG: 125 INJECTION, POWDER, LYOPHILIZED, FOR SOLUTION INTRAMUSCULAR; INTRAVENOUS at 16:43

## 2023-10-07 RX ADMIN — HEPARIN SODIUM 2000 UNITS: 1000 INJECTION INTRAVENOUS; SUBCUTANEOUS at 13:44

## 2023-10-07 RX ADMIN — IODIXANOL 100 ML: 320 INJECTION, SOLUTION INTRAVASCULAR at 14:28

## 2023-10-07 RX ADMIN — MIRTAZAPINE 7.5 MG: 7.5 TABLET, FILM COATED ORAL at 21:26

## 2023-10-07 RX ADMIN — FENTANYL CITRATE 50 MCG: 50 INJECTION, SOLUTION INTRAMUSCULAR; INTRAVENOUS at 13:10

## 2023-10-07 RX ADMIN — ACETAMINOPHEN 975 MG: 325 TABLET, FILM COATED ORAL at 19:40

## 2023-10-07 RX ADMIN — DEXAMETHASONE 4 MG: 4 TABLET ORAL at 23:10

## 2023-10-07 RX ADMIN — MIDAZOLAM 0.5 MG: 1 INJECTION INTRAMUSCULAR; INTRAVENOUS at 13:36

## 2023-10-07 RX ADMIN — FENTANYL CITRATE 25 MCG: 50 INJECTION, SOLUTION INTRAMUSCULAR; INTRAVENOUS at 13:36

## 2023-10-07 ASSESSMENT — VISUAL ACUITY
OU: NORMAL ACUITY;OTHER (SEE COMMENT)
OU: NORMAL ACUITY;OTHER (SEE COMMENT)

## 2023-10-07 ASSESSMENT — ACTIVITIES OF DAILY LIVING (ADL)
ADLS_ACUITY_SCORE: 35
ADLS_ACUITY_SCORE: 48
ADLS_ACUITY_SCORE: 35
ADLS_ACUITY_SCORE: 48
ADLS_ACUITY_SCORE: 46
ADLS_ACUITY_SCORE: 35

## 2023-10-07 NOTE — PROGRESS NOTES
Admitted/transferred from: Neuro IR  Reason for admission/transfer: Neuro monitoring   2 RN skin assessment: completed by Neeru RN & Katarzyna TRUJILLO RN     Major Shift Events: Pt A&O x4, occasionally disoriented to time. L upper extremity weak, 2/5. Strong R upper & lowers. L facial droop. Neuro IR cleared for bedside swallow, passed & regular diet started. R groin site, some bleeding noted; dressing changed. Off bedrest at 1640 per neuro IR. Up with 1-2 assist.     For vital signs and complete assessments, please see documentation flowsheets.

## 2023-10-07 NOTE — PLAN OF CARE
SLP: Clinical swallow eval orders received. Attempted to see pt, however pt NPO for angiogram. Will follow-up as appropriate.

## 2023-10-07 NOTE — PROGRESS NOTES
10/07/23 1351   Appointment Info   Signing Clinician's Name / Credentials (OT) miguel angel concetta ot/l   Living Environment   People in Home spouse   Current Living Arrangements house   Home Accessibility stairs to enter home   Number of Stairs, Main Entrance 2   Stair Railings, Main Entrance railings on both sides of stairs   Transportation Anticipated family or friend will provide   Living Environment Comments pt lives in single level house with SO and 2 weeks ago was walking greater than 10, 000 steps daily.   Self-Care   Usual Activity Tolerance good   Current Activity Tolerance moderate   Activity/Exercise Type walking   Exercise Amount/Frequency daily   Equipment Currently Used at Home grab bar, toilet;grab bar, tub/shower;shower chair   Instrumental Activities of Daily Living (IADL)   IADL Comments pt completing basic ADL with increased need for assist over past fw weeks   General Information   Referring Physician Araceli Vines   Patient/Family Therapy Goal Statement (OT) return to PLOF   Additional Occupational Profile Info/Pertinent History of Current Problem Pt reports she lives in a one-level home with her spouse, who will present/available to assist. Dtr reports 24/7 support will be available. 2 NANCY with handrails. Walk-in shower with grabbars and shower bench. Family provides transportation.   Existing Precautions/Restrictions fall   General Observations and Info pt motiated with supportive duaghter at bedside   Cognitive Status Examination   Orientation Status orientation to person, place and time   Cognitive Status Comments pt with Alzheimers at baseline but remains moderately I and able to problem solve and shows insight to deficits.   Visual Perception   Visual Impairment/Limitations WFL   Impact of Vision Impairment on Function (Vision) pt c/o intermittent L field cut however at this time no field cut noted, acuity intact   Sensory   Sensory Quick Adds sensation intact   Sensory Comments L protective  sensation present   Range of Motion Comprehensive   General Range of Motion other (see comments)   Comment, General Range of Motion R UE WFL, L UE PROM WFL, AROM limited by hemiparesis in hand/wrist and FA, pain in L hsoulder 2/2 previous RC tear limiting as well as hemiparesis.   Strength Comprehensive (MMT)   General Manual Muscle Testing (MMT) Assessment other (see comments)   Comment, General Manual Muscle Testing (MMT) Assessment R UE grossly 4/5, L hand and wrist 0-1/5, FA 1/5 and elbow shoulder grossly 2-3/5 confounded by L shoudler pain 2/2 RC tear   Coordination   Coordination Comments severe deficits in L hand, R hand WFL   Bed Mobility   Comment (Bed Mobility) NT   Transfers   Transfer Comments NT   Activities of Daily Living   BADL Assessment/Intervention lower body dressing;upper body dressing   Upper Body Dressing Assessment/Training   [place] Level (Upper Body Dressing) maximum assist (25% patient effort)  (per clinical judgement)   Lower Body Dressing Assessment/Training   [place] Level (Lower Body Dressing) maximum assist (25% patient effort)  (per clinical judgement.)   Clinical Impression   Criteria for Skilled Therapeutic Interventions Met (OT) Yes, treatment indicated   OT Diagnosis decreased ADL I   Assessment of Occupational Performance 5 or more Performance Deficits   Identified Performance Deficits dressing, bathing, toileting, G/H, home making, leisure, cooking.   Planned Therapy Interventions (OT) ADL retraining;IADL retraining;fine motor coordination training;joint mobilization;manual therapy;motor coordination training;neuromuscular re-education;orthoic fitting/training;ROM;strengthening;transfer training;home program guidelines;progressive activity/exercise;risk factor education   Clinical Decision Making Complexity (OT) low complexity   Risk & Benefits of therapy have been explained evaluation/treatment results reviewed;care plan/treatment goals reviewed;risks/benefits  reviewed;current/potential barriers reviewed;participants voiced agreement with care plan;participants included;patient;daughter   Clinical Impression Comments pt presents to OT with L hemiparesis lmiting ADL I   OT Total Evaluation Time   OT Eval, Low Complexity Minutes (16862) 5   OT Goals   Therapy Frequency (OT) 5 times/wk   OT Predicted Duration/Target Date for Goal Attainment 10/13/23   OT Goals Hygiene/Grooming;Upper Body Dressing;Lower Body Dressing;Toilet Transfer/Toileting   OT: Hygiene/Grooming modified independent;while standing   OT: Upper Body Dressing Modified independent   OT: Lower Body Dressing Modified independent   OT: Toilet Transfer/Toileting Modified independent;toilet transfer;cleaning and garment management   OT Discharge Planning   OT Plan follow up on splint wear and fit, L UE neuro re-ed   OT Discharge Recommendation (DC Rec) home with outpatient occupational therapy;home with assist   OT Rationale for DC Rec pt mobilizing well per PT eval and currently has sufficient AE and assist   OT Brief overview of current status splint fabricated and dressing techniques introduced, session interrupted by medical team for imaging/family education.   Total Session Time   Total Session Time (sum of timed and untimed services) 5

## 2023-10-07 NOTE — IR NOTE
Patient Name: Crys Gama  Medical Record Number: [id number]  Today's Date: 10/7/2023    Procedure: cerebral angiogram with angioplasty  Proceduralist: Dr ANTONETTE Johnson, Dr DYLAN Slaughter    Sedation start time: 1303  Sedation end time: 1433  Sedation medications administered: 2.5 mg versed, 125 mcg fentanyl  Total sedation time: 90 min  Sedation Notes: none     Other medications: 4000 units of heparin given IV per MAR as ordered.    Procedure start time: 1305  Procedure end time: 1433    Report given to: Le MILLER 4E  : none    Other Notes: Pt arrived to IR room 3 from ED. Consent reviewed, pt confirmed. Pt denies any questions or concerns regarding procedure. Pt positioned supine and monitored per protocol. Right groin accessed. Angioseal closure device deployed at 1435. 2 hours flat bedrest, Ambulation time 1635. Right groin site cleansed and dressed per protocol. Pt tolerated procedure without any noted complications. Pt transferred back to .

## 2023-10-07 NOTE — PROGRESS NOTES
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     History obtained by daughter per patient's request given her history of dementia.     During May 2023, the patient was diagnosed with Alzheimer's disease.  As part of the work-up, brain imaging revealed the right ICA aneurysm.  Patient has been dealing with progressive memory loss for the past 6 years.  No hallucinations. Endorses gradual decline in ability to find the exact words she's thinking.     She had an elective stent placed in the aneurysm 8/17/23     Around Labor Day, the patient started to progressively developed weakness in her left arm. Since then, she has received multiple brain images that revealed multifocal right MCA territory infarcts.  2 weeks ago, neighbor noticed onset of a left facial droop. 4 days ago, she developed difficulty seeing from the periphery of her L eye - this symptoms has fluctuated in severity (was present earlier today with neuro-IR provider's exam but resolved on my exam). Yesterday, daughter states that patient was particularly feeling unwell.  She was very weak in her left arm and her gait has significantly slowed.  She also appeared to be more confused, with the inability to figure out how to put her shirt over her head (complicated by L arm weakness).      Patient's gait is now extremely abnormal.  2 weeks ago, patient was ambulating independently without difficulty.  With the ability to walk more than 10,000 steps a day.  However, now the patient walks very slowly and " "is very cautious with her steps.\"    Medical History:  Past Medical History:   Diagnosis Date    Asthma, mild intermittent     Family history of colon cancer     had colonoscopy in 2008, due in 5 years    GERD (gastroesophageal reflux disease)     History of nonmelanoma skin cancer     HTN (hypertension)     Hyperlipidemia     Osteoporosis     Squamous cell carcinoma of skin, unspecified     Vitiligo        Surgical History:  Past Surgical History:   Procedure Laterality Date    HC REMOVAL GALLBLADDER  4-1999    IR CAROTID CEREBRAL ANGIOGRAM BILATERAL  8/17/2023    SURGICAL HISTORY OF -       wrist and finger surgery for fracture       Family History:  Family History   Problem Relation Age of Onset    Asthma Mother     Diabetes Mother     Hypertension Mother     Allergies Mother     Arthritis Mother     Cancer Mother     Eye Disorder Mother     Gastrointestinal Disease Mother     Gynecology Mother     Lipids Mother     Osteoporosis Mother     Respiratory Mother     C.A.D. Father     Diabetes Father     Hypertension Father     Blood Disease Father     Cancer Father     Cardiovascular Father     Circulatory Father     Heart Disease Father     Lipids Father     Diabetes Maternal Grandmother     Heart Disease Maternal Grandmother     Osteoporosis Maternal Grandmother     Thyroid Disease Maternal Grandmother     C.A.D. Maternal Grandfather     Cardiovascular Maternal Grandfather     Cancer Paternal Grandmother     Hypertension Brother     Cardiovascular Brother     Lipids Brother     Hypertension Sister     Cancer Sister     Gastrointestinal Disease Sister     Gynecology Sister     Genitourinary Problems Sister     Lipids Sister     Obesity Sister     Asthma Daughter     Allergies Daughter     Gastrointestinal Disease Daughter     Obesity Daughter     Glaucoma No family hx of     Cerebrovascular Disease Paternal Aunt     Hypertension Brother     Connective Tissue Disorder Brother         pancreatic problem        Social " History:  Social History     Socioeconomic History    Marital status:      Spouse name: Not on file    Number of children: 2    Years of education: Not on file    Highest education level: Not on file   Occupational History     Employer: [employer]   Tobacco Use    Smoking status: Never    Smokeless tobacco: Never   Vaping Use    Vaping Use: Never used   Substance and Sexual Activity    Alcohol use: No    Drug use: No    Sexual activity: Yes     Partners: Male   Other Topics Concern    Parent/sibling w/ CABG, MI or angioplasty before 65F 55M? Not Asked   Social History Narrative    Not on file     Social Determinants of Health     Financial Resource Strain: Not on file   Food Insecurity: Not on file   Transportation Needs: Not on file   Physical Activity: Not on file   Stress: Not on file   Social Connections: Not on file   Interpersonal Safety: Not on file   Housing Stability: Not on file       Allergies:  Allergies   Allergen Reactions    Azithromycin Hives    Erythromycin Hives    Pcn [Penicillins] Hives    Tetracycline Hives       Is there a contrast allergy?  No    Medications:  (Not in a hospital admission) .    ROS:  The 10 point Review of Systems is negative other than noted in the HPI or here.    PHYSICAL EXAMINATION  Vital Signs:  B/P: 123/79,  T: 98,  P: 95,  R: 16    Cardio:  RRR  Pulmonary:  no respiratory distress  Abdomen:  soft, non-tender, non-distended    Neurologic  Mental Status:  fully alert, attentive and oriented, follows commands, speech clear and fluent  Cranial Nerves:  visual fields intact, PERRL, EOMI with normal smooth pursuit, hearing not formally tested but intact to conversation, palate elevation symmetric and uvula midline, no dysarthria, shoulder shrug strong bilaterally, tongue protrusion midline, Left facial droop.   Motor:  Left sided weakness.   Sensory:  intact/symmetric to light touch and pin prick throughout upper and lower extremities  Coordination:  FNF and HS intact  without dysmetria on the right.     Pre-procedure National Institutes of Health Stroke Scale:       NIHSS  1a. Level of Consciousness 0-->Alert, keenly responsive   1b. LOC Questions 1-->Answers one question correctly   1c. LOC Commands 0-->Performs both tasks correctly   2.   Best Gaze 0-->Normal   3.   Visual 0-->No visual loss   4.   Facial Palsy 1-->Minor paralysis (flattened nasolabial fold, asymmetry on smiling)   5a. Motor Arm, Left 1-->Drift, limb holds 90 (or 45) degrees, but drifts down before full 10 seconds, does not hit bed or other support   5b. Motor Arm, Right 0-->No drift, limb holds 90 (or 45) degrees for full 10 secs   6a. Motor Leg, Left 0-->No drift, leg holds 30 degree position for full 5 secs   6b. Motor Leg, right 0-->No drift, leg holds 30 degree position for full 5 secs   7.   Limb Ataxia 0-->Absent   8.   Sensory 1-->Mild-to-moderate sensory loss, patient feels pinprick is less sharp or is dull on the affected side, or there is a loss of superficial pain with pinprick, but patient is aware of being touched   9.   Best Language 0-->No aphasia, normal   10. Dysarthria 0-->Normal   11. 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 Initial NIHSS 4 in setting of answering month incorrectly, mild L facial droop, mild L arm drift  with associated decrease in sensation     PLAN:     - Diagnostic Cerebral Angiogram  - Radial or Femoral Access  - Moderate Sedation  - Use of Closure Device     PRE-PROCEDURE SEDATION ASSESSMENT     Pre-Procedure Sedation Assessment done at 1200.    Expected Level:  Moderate Sedation    Indication:  Sedation is required to allow for neurointerventional procedure.    Consent obtained from patient and relative Daughter after discussing the risks, benefits and alternatives.     PO Intake:  Appropriately NPO for procedure    ASA Class:  Class 2 - MILD SYSTEMIC DISEASE, NO ACUTE PROBLEMS, NO FUNCTIONAL LIMITATIONS.    Mallampati:  Grade 2:  Soft palate, base of uvula, tonsillar pillars, and portion of posterior pharyngeal wall visible    History and physical reviewed and no updates needed. I have reviewed the lab findings, diagnostic data, medications, and the plan for sedation. I have determined this patient to be an appropriate candidate for the planned sedation and procedure and have reassessed the patient IMMEDIATELY PRIOR to sedation and procedure.    Patient was discussed with the Attending, Dr. Johnson, who agrees with the plan.    Billy Slaughter MD, MPH  Neuroendovascular Surgery Fellow  [facility]  Pager: [phone]

## 2023-10-07 NOTE — PROCEDURES
[facility]     Endovascular Surgical Neuroradiology Post-Procedure Note    Pre-Procedure Diagnosis:  Intimal Hyperplasia over the Flow Diverter  Post-Procedure Diagnosis:  Intimal Hyperplasia over the Flow Diverter    Procedure(s):   Intracranial Angioplasty    Findings:    - Mild to moderate intimal hyperplasia noted in the Flow Diverter  - Angioplasty of the Flow Diverter using Scepter C 4 mm x 15 mm and [place] 4mm x 15 mm Non-Compliant Balloon  - No residual aneurysm noted.     Plan:    - Bed Rest for 2 hours.   - Stop Cangrelor  - Continue DAPT - ASA + Plavix  - Solumedrol one time 125 mg dose   - Followed by Decadron taper.     Primary Surgeon:  Dr. Rickey Johnson  Secondary Surgeon:  Not applicable  Secondary Surgeon Review:  None  Fellow:  Sukhjinder  Additional Assistants:     Prior to the start of the procedure and with procedural staff participation, I verbally confirmed: the patient s identity using two indicators, relevant allergies, that the procedure was appropriate and matched the consent or emergent situation, and that the correct equipment/implants were available. Immediately prior to starting the procedure I conducted the Time Out with the procedural staff and re-confirmed the patient s name, procedure, and site/side. (The Joint Commission universal protocol was followed.)  Yes    PRU value: 7    Anesthesia:  Conscious Sedation  Medications:  Fentanyl, Midazolam  Puncture site:  Right Femoral Artery    Fluoroscopy time (minutes):  54  Radiation dose (mGy):  995  Contrast amount (mL):  120    Estimated blood loss (mL): 15    Closure:  Device    Disposition:  Will be followed in hospital by the Neuro Critical Care/Stroke team.        Sedation Post-Procedure Summary    Sedatives: Fentanyl and Midazolam (Versed)    Vital signs and pulse oximetry were monitored and remained stable throughout the procedure, and sedation was maintained until the procedure  was complete.  The patient was monitored by staff until sedation discharge criteria were met.    Patient tolerance:  Patient tolerated the procedure well with no immediate complications.    Time of sedation in minutes:  90 minutes from beginning to end of physician one to one monitoring.    Billy Slaughter MD, MPH  Neuroendovascular Surgery Fellow  [facility]  Pager: [phone]

## 2023-10-07 NOTE — PROGRESS NOTES
"Evaluation completed in ED unit.    Jeanine Cabezas, PT. Pager [phone]      10/07/23 9898   Appointment Info   Signing Clinician's Name / Credentials (PT) Jeanine Cabezas, PT, DPT   Rehab Comments (PT) LUE flaccid, anxious       Present no   Living Environment   People in Home spouse   Current Living Arrangements house   Home Accessibility stairs to enter home   Number of Stairs, Main Entrance 2   Stair Railings, Main Entrance railings on both sides of stairs;railings safe and in good condition   Transportation Anticipated family or friend will provide   Living Environment Comments Pt reports she lives in a one-level home with her spouse, who will present/available to assist. Dtr reports 24/7 support will be available. 2 NANCY with handrails. Walk-in shower with grabbars and shower bench. Family provides transportation.   Self-Care   Usual Activity Tolerance good   Current Activity Tolerance moderate   Regular Exercise Yes   Activity/Exercise Type walking   Exercise Amount/Frequency daily   Equipment Currently Used at Home none   Fall history within last six months no   Activity/Exercise/Self-Care Comment IND with ADL's and mobility. No falls reported. Walks daily for exercise; good endurance.   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 stent c/b multiple recurrent R MCA territory strokes, and anxiety who presents for elective cerebral angiogram and angioplasty in setting of progressive weakness of her L arm associated with development of L facial droop.\"   Existing Precautions/Restrictions fall   Weight-Bearing Status - LUE full " weight-bearing   Weight-Bearing Status - RUE full weight-bearing   Weight-Bearing Status - LLE full weight-bearing   Weight-Bearing Status - RLE full weight-bearing   General Observations Activity: up ad ana rosa   Cognition   Orientation Status (Cognition) oriented to;person;situation   Pain Assessment   Patient Currently in Pain No   Integumentary/Edema   Integumentary/Edema no deficits were identifed   Posture    Posture Forward head position;Protracted shoulders   Range of Motion (ROM)   ROM Comment All ROM WFL except for LUE which is limited by weakness and decreased sensation   Strength (Manual Muscle Testing)   Strength (Manual Muscle Testing) Deficits observed during functional mobility   Strength Comments grossly 3/5 in BLE and RUE; 0/5 in LUE   Bed Mobility   Comment, (Bed Mobility) supine > sit with min A and HOB elevated   Transfers   Comment, (Transfers) sit > stand with SBA/CGA and no AD   Gait/Stairs (Locomotion)   Distance in Feet 150ft   Comment, (Gait/Stairs) Ambulated 150ft with SBA/CGA and no AD   Balance   Balance other (describe)   Balance Comments fair(+) sitting balance; fair/fair(+) standing balance   Sensory Examination   Sensory Perception other (describe)   Sensory Perception Comments reports numbness to LUE   Coordination   Coordination no deficits were identified   Muscle Tone   Muscle Tone no deficits were identified   Clinical Impression   Criteria for Skilled Therapeutic Intervention Yes, treatment indicated   PT Diagnosis (PT) impaired functional mobility   Influenced by the following impairments decreased balance and strength   Functional limitations due to impairments difficulty with functional mobility   Clinical Presentation (PT Evaluation Complexity) Stable/Uncomplicated   Clinical Presentation Rationale per clinical judgment   Clinical Decision Making (Complexity) low complexity   Planned Therapy Interventions (PT) balance training;bed mobility training;gait training;motor  coordination training;neuromuscular re-education;patient/family education;postural re-education;ROM (range of motion);stair training;strengthening;stretching;transfer training;progressive activity/exercise;risk factor education;home program guidelines   Anticipated Equipment Needs at Discharge (PT)   (tbd)   Risk & Benefits of therapy have been explained evaluation/treatment results reviewed;care plan/treatment goals reviewed;risks/benefits reviewed;current/potential barriers reviewed;participants voiced agreement with care plan;participants included;patient;daughter   PT Total Evaluation Time   PT Eval, Low Complexity Minutes ([id number]) 10   Physical Therapy Goals   PT Frequency 3x/week   PT Predicted Duration/Target Date for Goal Attainment 10/14/23   PT: Bed Mobility Independent;Supine to/from sit;Rolling;Bridging   PT: Transfers Independent;Sit to/from stand;Bed to/from chair   PT: Gait Independent;Greater than 200 feet   PT: Stairs Modified independent;2 stairs;Rail on both sides   PT: Goal 1 Pt will demonstrate a low risk for falls on any standardized balance assessment to reduce risk for falls and readmission.   PT Discharge Planning   PT Plan balance, endurance, any home setup concerns   PT Discharge Recommendation (DC Rec) home with assist;home with outpatient physical therapy   PT Rationale for DC Rec Pt is slightly below baseline for functional mobility. Will benefit from IP PT during stay to reduce deconditioning. Once medically appropriate, anticipated to d/c home with assist from family for ADL's and mobility as needed. Will also benefit from OP PT to maintain IND and allow pt to return to community activities she enjoys. Pt and daughter in agreement.   PT Brief overview of current status SBA in hallway; encourage up to recliner and hallway walking 3-4x/day; ok to ambulate in hallway with daughter

## 2023-10-08 ENCOUNTER — APPOINTMENT (OUTPATIENT)
Dept: OCCUPATIONAL THERAPY | Facility: CLINIC | Age: 73
DRG: 038 | End: 2023-10-08
Payer: MEDICARE

## 2023-10-08 ENCOUNTER — APPOINTMENT (OUTPATIENT)
Dept: SPEECH THERAPY | Facility: CLINIC | Age: 73
DRG: 038 | End: 2023-10-08
Payer: MEDICARE

## 2023-10-08 LAB
ANION GAP SERPL CALCULATED.3IONS-SCNC: 12 MMOL/L (ref 7–15)
BUN SERPL-MCNC: 21.5 MG/DL (ref 8–23)
CALCIUM SERPL-MCNC: 8.8 MG/DL (ref 8.8–10.2)
CHLORIDE SERPL-SCNC: 105 MMOL/L (ref 98–107)
CREAT SERPL-MCNC: 0.5 MG/DL (ref 0.51–0.95)
DEPRECATED HCO3 PLAS-SCNC: 22 MMOL/L (ref 22–29)
EGFRCR SERPLBLD CKD-EPI 2021: >90 ML/MIN/1.73M2
ERYTHROCYTE [DISTWIDTH] IN BLOOD BY AUTOMATED COUNT: 12.8 % (ref 10–15)
GLUCOSE BLDC GLUCOMTR-MCNC: 176 MG/DL (ref 70–99)
GLUCOSE SERPL-MCNC: 167 MG/DL (ref 70–99)
HCT VFR BLD AUTO: 35.7 % (ref 35–47)
HGB BLD-MCNC: 11.6 G/DL (ref 11.7–15.7)
MCH RBC QN AUTO: 27.6 PG (ref 26.5–33)
MCHC RBC AUTO-ENTMCNC: 32.5 G/DL (ref 31.5–36.5)
MCV RBC AUTO: 85 FL (ref 78–100)
PLATELET # BLD AUTO: 207 10E3/UL (ref 150–450)
POTASSIUM SERPL-SCNC: 3.8 MMOL/L (ref 3.4–5.3)
RBC # BLD AUTO: 4.21 10E6/UL (ref 3.8–5.2)
SODIUM SERPL-SCNC: 139 MMOL/L (ref 135–145)
WBC # BLD AUTO: 5.3 10E3/UL (ref 4–11)

## 2023-10-08 PROCEDURE — 250N000013 HC RX MED GY IP 250 OP 250 PS 637: Performed by: STUDENT IN AN ORGANIZED HEALTH CARE EDUCATION/TRAINING PROGRAM

## 2023-10-08 PROCEDURE — 97760 ORTHOTIC MGMT&TRAING 1ST ENC: CPT | Mod: GO

## 2023-10-08 PROCEDURE — 250N000013 HC RX MED GY IP 250 OP 250 PS 637

## 2023-10-08 PROCEDURE — 250N000012 HC RX MED GY IP 250 OP 636 PS 637: Performed by: STUDENT IN AN ORGANIZED HEALTH CARE EDUCATION/TRAINING PROGRAM

## 2023-10-08 PROCEDURE — 36415 COLL VENOUS BLD VENIPUNCTURE: CPT

## 2023-10-08 PROCEDURE — 97535 SELF CARE MNGMENT TRAINING: CPT | Mod: GO

## 2023-10-08 PROCEDURE — 85027 COMPLETE CBC AUTOMATED: CPT

## 2023-10-08 PROCEDURE — 200N000002 HC R&B ICU UMMC

## 2023-10-08 PROCEDURE — 92610 EVALUATE SWALLOWING FUNCTION: CPT | Mod: GN

## 2023-10-08 PROCEDURE — 92526 ORAL FUNCTION THERAPY: CPT | Mod: GN

## 2023-10-08 PROCEDURE — 97530 THERAPEUTIC ACTIVITIES: CPT | Mod: GO

## 2023-10-08 PROCEDURE — 80048 BASIC METABOLIC PNL TOTAL CA: CPT

## 2023-10-08 RX ORDER — DONEPEZIL HYDROCHLORIDE 5 MG/1
10 TABLET, FILM COATED ORAL EVERY EVENING
Status: DISCONTINUED | OUTPATIENT
Start: 2023-10-09 | End: 2023-10-13 | Stop reason: HOSPADM

## 2023-10-08 RX ORDER — ATORVASTATIN CALCIUM 40 MG/1
40 TABLET, FILM COATED ORAL EVERY EVENING
Status: DISCONTINUED | OUTPATIENT
Start: 2023-10-08 | End: 2023-10-13 | Stop reason: HOSPADM

## 2023-10-08 RX ORDER — ATORVASTATIN CALCIUM 40 MG/1
40 TABLET, FILM COATED ORAL DAILY
COMMUNITY

## 2023-10-08 RX ORDER — DONEPEZIL HYDROCHLORIDE 5 MG/1
5 TABLET, FILM COATED ORAL ONCE
Status: COMPLETED | OUTPATIENT
Start: 2023-10-08 | End: 2023-10-08

## 2023-10-08 RX ADMIN — ACETAMINOPHEN 975 MG: 325 TABLET, FILM COATED ORAL at 06:09

## 2023-10-08 RX ADMIN — ASPIRIN 325 MG ORAL TABLET 325 MG: 325 PILL ORAL at 07:32

## 2023-10-08 RX ADMIN — DEXAMETHASONE 4 MG: 4 TABLET ORAL at 06:24

## 2023-10-08 RX ADMIN — ACETAMINOPHEN 975 MG: 325 TABLET, FILM COATED ORAL at 16:51

## 2023-10-08 RX ADMIN — CLOPIDOGREL BISULFATE 75 MG: 75 TABLET ORAL at 07:32

## 2023-10-08 RX ADMIN — DONEPEZIL HYDROCHLORIDE 5 MG: 5 TABLET, FILM COATED ORAL at 20:26

## 2023-10-08 RX ADMIN — DEXAMETHASONE 4 MG: 4 TABLET ORAL at 12:03

## 2023-10-08 RX ADMIN — ATORVASTATIN CALCIUM 40 MG: 40 TABLET, FILM COATED ORAL at 20:26

## 2023-10-08 RX ADMIN — DEXAMETHASONE 4 MG: 4 TABLET ORAL at 16:59

## 2023-10-08 RX ADMIN — DONEPEZIL HYDROCHLORIDE 5 MG: 5 TABLET, FILM COATED ORAL at 07:33

## 2023-10-08 ASSESSMENT — ACTIVITIES OF DAILY LIVING (ADL)
ADLS_ACUITY_SCORE: 46
WALKING_OR_CLIMBING_STAIRS_DIFFICULTY: NO
ADLS_ACUITY_SCORE: 32
EQUIPMENT_CURRENTLY_USED_AT_HOME: GRAB BAR, TOILET;GRAB BAR, TUB/SHOWER;SHOWER CHAIR
DIFFICULTY_EATING/SWALLOWING: NO
ADLS_ACUITY_SCORE: 32
VISION_MANAGEMENT: GLASSES
ADLS_ACUITY_SCORE: 46
WEAR_GLASSES_OR_BLIND: YES
FALL_HISTORY_WITHIN_LAST_SIX_MONTHS: NO
CHANGE_IN_FUNCTIONAL_STATUS_SINCE_ONSET_OF_CURRENT_ILLNESS/INJURY: NO
ADLS_ACUITY_SCORE: 32
ADLS_ACUITY_SCORE: 46
DOING_ERRANDS_INDEPENDENTLY_DIFFICULTY: NO
ADLS_ACUITY_SCORE: 32
ADLS_ACUITY_SCORE: 32
TOILETING_ISSUES: NO
DRESSING/BATHING_DIFFICULTY: NO
ADLS_ACUITY_SCORE: 32
ADLS_ACUITY_SCORE: 46
CONCENTRATING,_REMEMBERING_OR_MAKING_DECISIONS_DIFFICULTY: YES

## 2023-10-08 ASSESSMENT — VISUAL ACUITY
OU: OTHER (SEE COMMENT)
OU: NORMAL ACUITY
OU: NORMAL ACUITY;OTHER (SEE COMMENT)
OU: NORMAL ACUITY

## 2023-10-08 NOTE — PHARMACY-ADMISSION MEDICATION HISTORY
Pharmacist Admission Medication History    Admission medication history is complete. The information provided in this note is only as accurate as the sources available at the time of the update.    Information Source(s): Laury/Deni via chart review - patient admitted to [facility] 9/30/23-10/3/23 and pharmacy member completed medication history upon admission.     Pertinent Information: None    Changes made to PTA medication list:  Added: collagen  Deleted: celecoxib - no fills seen and not on med history from [facility]  Changed: donepezil from 5mg to 10mg daily; vitamin D from 50mcg to 25mcg daily; simvastatin 10mg to atorvastatin 40mg per [facility] discharge summary    Allergies reviewed with patient and updates made in EHR: no    Medication History Completed By: Maureen Darby RP 10/8/2023 1:15 PM    PTA Med List   Medication Sig Last Dose    aspirin (ASA) 325 MG tablet Take 325 mg by mouth daily Past Week    atorvastatin (LIPITOR) 40 MG tablet Take 40 mg by mouth daily Past Week    cetirizine (ZYRTEC) 10 MG tablet Take 10 mg by mouth daily Past Week    clopidogrel (PLAVIX) 75 MG tablet Please begin taking 1 tablet 5 days prior to procedure. (Patient taking differently: Take 75 mg by mouth daily) Past Week    COLLAGEN PO  Past Month    cyanocobalamin (VITAMIN B-12) 1000 MCG tablet Take 1,000 mcg by mouth daily Past Week    donepezil (ARICEPT) 10 MG tablet Take 10 mg by mouth daily Past Week    mirtazapine (REMERON) 7.5 MG tablet Take 1 tablet by mouth At Bedtime Past Week    Multiple Vitamin (MULTI-DAY PO) Take 1 tablet by mouth daily. Past Week    Vitamin D3 (CHOLECALCIFEROL) 25 mcg (1000 units) tablet Take 25 mcg by mouth daily Past Week

## 2023-10-08 NOTE — PLAN OF CARE
Goal Outcome Evaluation:    Major Shift Events: Pt A&O x4, occasionally disoriented to time. L upper extremity weak, 3/5. Strong R upper & lowers. L facial droop. 1200: Patient with confusion/yelling/paranoia episode - pt reoriented and calmed down, had daughter come back to room & resolved. Walked malcolm with therapy.    R groin site WNL.  Transfer to  when bed available.   For vital signs and complete assessments, please see documentation flowsheets.

## 2023-10-08 NOTE — PROGRESS NOTES
Neuro Interventional Progress Note    10/08/2023    Crys Gama is a 73 year old year old female patient admitted on 10/6/2023 with left sided facial droop and arm weakness, she had a history of a right ICA flow diverter stent placed for an incidental aneurysm. She then developed multiple right sided strokes over the course of a few months.       Problem List  1. Right MCA territory strokes with left sided weakness.       24 hour events:  Had a run of agitation and tachycardia that was able to be redirected with talking to her .     24 Hour Vital Signs Summary:  Temperatures:  Current - Temp: 98.4  F (36.9  C); Max - Temp  Av.2  F (36.8  C)  Min: 97.9  F (36.6  C)  Max: 98.4  F (36.9  C)  Respiration range: Resp  Av.8  Min: 7  Max: 31  Pulse range: Pulse  Av.7  Min: 65  Max: 156  Blood pressure range: Systolic (24hrs), Av , Min:96 , Max:177   ; Diastolic (24hrs), Av, Min:70, Max:110    Pulse oximetry range: SpO2  Av.4 %  Min: 95 %  Max: 100 %    Current Medications:   aspirin  325 mg Oral or Feeding Tube Daily    clopidogrel  75 mg Oral Daily    dexAMETHasone  4 mg Oral Q6H COOKIE    Followed by    [START ON 10/10/2023] dexAMETHasone  2 mg Oral BID    Followed by    [START ON 10/12/2023] dexAMETHasone  1 mg Oral BID    Followed by    [START ON 10/14/2023] dexAMETHasone  1 mg Oral Daily    donepezil  5 mg Oral or Feeding Tube Daily    mirtazapine  7.5 mg Oral or Feeding Tube At Bedtime    simvastatin  10 mg Oral or Feeding Tube At Bedtime    sodium chloride (PF)  3 mL Intracatheter Q8H       PRN Medications:  acetaminophen, - MEDICATION INSTRUCTIONS -, labetalol **OR** hydrALAZINE, lidocaine 4%, lidocaine (buffered or not buffered), - MEDICATION INSTRUCTIONS -, - MEDICATION INSTRUCTIONS -, - MEDICATION INSTRUCTIONS -, ondansetron **OR** ondansetron, prochlorperazine **OR** prochlorperazine **OR** prochlorperazine, sodium chloride (PF)    Infusions:   - MEDICATION INSTRUCTIONS -       - MEDICATION INSTRUCTIONS -      - MEDICATION INSTRUCTIONS -      - MEDICATION INSTRUCTIONS -         Allergies   Allergen Reactions    Azithromycin Hives    Erythromycin Hives    Pcn [Penicillins] Hives    Tetracycline Hives       Physical Examination:    Mental:Awake, alert, attentive, oriented to self, time, place, and circumstance. Language is fluent and coherent with intact comprehension of complex and crossed commands, naming, and repetition. No visuospatial hemineglect.  Cranial Nerves: VFF,EOMI,  face symmetric, tongue midline, no dysarthria, equal shoulder shrug  Motor: No drift in 3/4 extremities, there is 3/5 weakness in left arm abduction, 2/5 in finger extension and 0/5 in finger flexion. Arm extension and flexion are 3/5, no abnormal movements  Sensory: Sensation intact to light touch in 4/4 extremities. No hemisensory neglect.  Cerebellar: No ataxia or dysmetria to finger-to-nose or heel-to-shin testing  Gait: deferred    Labs/Studies:  Recent Labs   Lab Test 10/08/23  0336 10/07/23  1108 10/07/23  0558 10/06/23  1458 10/05/23  1516 08/17/23  1958 08/17/23  1029   NA  --   --  142 142  --   --  141   POTASSIUM  --   --  4.0 4.3  --   --  3.9   CHLORIDE  --   --  108* 107  --   --  105   CO2  --   --  25 26  --   --  26   ANIONGAP  --   --  9 9  --   --  10   * 100* 112* 99  --    < > 99   BUN  --   --  13.7 21.8  --   --  20.3   CR  --   --  0.48* 0.55 0.6  --  0.62   FRANK  --   --  8.7* 9.1  --   --  9.2   WBC  --   --  4.7 4.4  --   --  4.0   RBC  --   --  4.25 4.40  --   --  4.34   HGB  --   --  11.7 12.2  --   --  12.2   PLT  --   --  187 203  --   --  201    < > = values in this interval not displayed.       Recent Labs   Lab Test 10/06/23  1458   INR 0.98   PTT 26         No lab results found in last 7 days.        Assessment/Plan  Left sided weakness- secondary to ischemic strokes in right ICA territory. She had a nickel based flow diverter stent placed- possible etiologies of stroke  could be stent malposition or acute allergic reaction to the stent material resulting in inflammatory response. She is status post angioplasty.  Plan:  -transfer to floor  -Q4h neurochecks  -PRN seroquel at night   -Delirium precautions  -Continue aspirin and plavix for 6 months  -Continue steroids Decadron 4mg with taper.     Tracy Doty MD  Endovascular Surgical Neuroradiology Fellow  [facility]  [phone]    Endovascular Surgical Neuroradiology staff is Dr. Johnson

## 2023-10-08 NOTE — PROGRESS NOTES
Provider Notification: 4507 K.S. Pt had another episode of paranoia. Floor orders? Plavix was d/c do we need that ordered? Family is present asking for provider update. thanks! [phone]

## 2023-10-08 NOTE — PROVIDER NOTIFICATION
"2257 K.S. Pt stated she is seeing \"dust\" floating against the gray wall & R side headache. Tylenol given. Just wanted to let you know!  "

## 2023-10-08 NOTE — PHARMACY-CONSULT NOTE
Pharmacy Consult to evaluate for medication related stroke core measures    Crys Gama, 73 year old female admitted for left sided weakness- secondary to ischemic strokes in right ICA territory on 10/6/2023.    Thrombolytic was not given because etiologies of stroke were thought to be secondary to previous stent and either malposition or acute allergic reaction to the stent material.     VTE Prophylaxis SCDs /PCDs placed on 10/7/23, as appropriate prior to end of hospital day 2.    Antithrombotic: aspirin and clopidogrel started on 10/7-10/8/23, as appropriate by end of hospital day 2 (cangrelor used as bridging antiplatelet agent). Continue antithrombotic therapy on discharge to meet quality measures, unless contraindicated.    Anticoagulation if history of A-fib/flutter: Patient does not have history of A-fib/flutter - anticoagulation not required for medication related stroke core measures.     LDL Cholesterol Calculated   Date Value Ref Range Status   10/06/2023 59 <=100 mg/dL Final   08/07/2012 92 0 - 129 mg/dL Final     Comment:     LDL Cholesterol is the primary guide to therapy: LDL-cholesterol goal in high   risk patients is <100 mg/dL and in very high risk patients is <70 mg/dL.       Patient's home statin, Zocor (simvastatin) restarted; continue statin on discharge to meet quality measures, unless contraindicated.     Recommendations: None at this time    Thank you for the consult.    Maureen Darby RPH 10/8/2023 1:01 PM

## 2023-10-08 NOTE — PROGRESS NOTES
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 Initial NIHSS 4 in setting of answering month incorrectly, mild L facial droop, mild L arm drift with associated decrease in sensation. Left sided weakness- secondary to ischemic strokes in right ICA territory. She had a nickel based flow diverter stent placed- possible etiologies of stroke could be stent malposition or acute allergic reaction to the stent material resulting in inflammatory response. She is status post angioplasty. Clinical swallow eval completed at 1100 per MD orders.   Type of Evaluation   Type of Evaluation Swallow Evaluation   Oral Motor   Oral Musculature anomalies present   Structural Abnormalities none present   Mucosal Quality adequate   Dentition (Oral Motor)   Dentition (Oral Motor) adequate dentition   Facial Symmetry (Oral Motor)   Facial Symmetry (Oral Motor) left side impairment   Left Side Facial Asymmetry moderate impairment   Lip Function (Oral Motor)   Lip Range of Motion (Oral Motor) WNL   Tongue Function (Oral Motor)   Tongue ROM (Oral Motor) WNL   Jaw Function (Oral Motor)   Jaw Function (Oral Motor) WNL   Cough/Swallow/Gag Reflex (Oral Motor)   Volitional Throat Clear/Cough (Oral Motor) WNL   Vocal Quality/Secretion Management (Oral Motor)   Vocal Quality (Oral Motor) WNL   Secretion Management (Oral Motor) WNL    General Swallowing Observations   Past History of Dysphagia No hx of dysphagia per chart review or pt report   Respiratory Support (General Swallowing Observations) none   Current Diet/Method of Nutritional Intake (General Swallowing Observations, NIS) thin liquids (level 0);regular diet   Swallowing Evaluation Clinical swallow evaluation   Clinical Swallow Evaluation   Feeding Assistance no assistance needed   Clinical Swallow Evaluation Textures Trialed thin liquids;solid foods   Clinical Swallow Eval: Thin Liquid Texture Trial   Mode of Presentation, Thin Liquids straw;self-fed   Volume of Liquid or Food Presented 4 oz   Oral Phase of Swallow WFL   Pharyngeal Phase of Swallow intact   Diagnostic Statement No overt s/sx of aspiration   Clinical Swallow Evaluation: Solid Food Texture Trial   Mode of Presentation self-fed   Volume Presented 1 cracker   Oral Phase WFL   Pharyngeal Phase intact   Diagnostic Statement No overt s/sx of aspiration. Functional time for mastication.   Esophageal Phase of Swallow   Patient reports or presents with symptoms of esophageal dysphagia No   Swallowing Recommendations   Diet Consistency Recommendations thin liquids (level 0);regular diet   Supervision Level for Intake patient independent   Mode of Delivery Recommendations bolus size, small;slow rate of intake   Swallowing Maneuver Recommendations alternate food and liquid intake   Monitoring/Assistance Required (Eating/Swallowing) monitor for cough or change in vocal quality with intake;stop eating activities when fatigue is present   Recommended Feeding/Eating Techniques (Swallow Eval) maintain upright sitting position for eating;maintain upright posture during/after eating for 30 minutes;minimize distractions during oral intake;provide 6 smaller meals throughout day;set-up and prepare tray   Medication Administration Recommendations, Swallowing (SLP) as tolerated   Instrumental Assessment Recommendations instrumental  evaluation not recommended at this time   General Therapy Interventions   Planned Therapy Interventions Dysphagia Treatment   Dysphagia treatment Instruction of safe swallow strategies;Compensatory strategies for swallowing   Clinical Impression   Criteria for Skilled Therapeutic Interventions Met (SLP Eval) Current level of function same as previous level of function   SLP Diagnosis Oropharyngeal swallow WFL   Risks & Benefits of therapy have been explained evaluation/treatment results reviewed;care plan/treatment goals reviewed;risks/benefits reviewed;current/potential barriers reviewed;participants voiced agreement with care plan;participants included;patient   Clinical Impression Comments Clincial swallow eval completed per MD orders. Pt presents with functional oropharyngeal swallowing skills. Oral mech exam notable for L-sided facial droop, otherwise WFL. Pt tolerated thin liquids via straw and regular solid textures with no overt s/sx of aspiration. Pt with functional time for mastication. Recommend regular textures and thin liquids. Pt should be fully upright and alert for all PO, take small sips/bites, slow pacing, alternate between consistencies, and monitor for L sided oral residuals. Pt verbalized good understanding. No additional ST needs indicated.   SLP Discharge Recommendation home with assist   SLP Rationale for DC Rec no additional ST needs indicated   SLP Brief overview of current status  Recommend regular textures and thin liquids. Pt should be fully upright and alert for all PO, take small sips/bites, slow pacing, alternate between consistencies, and monitor for L sided oral residuals.   Total Session Time   Total Session Time (sum of timed and untimed services) 18

## 2023-10-08 NOTE — PLAN OF CARE
Goal Outcome Evaluation:    Major Shift Events:  None.  Neuro: Patient is A&OX4, denies pain and nausea.  Follows commands, moves all limbs, she had L-hand weakness, cannot wiggles fingers but can move the hand.  She also has L-facial droop. Woke up confused at 6am, could not be oriented and had the spouse talked to the patient and was able to calm down and Assist X1 to commode and bed.    Resp: On Room air, SaO2 in mid 90s, LS clear.  Cards: SR/ST in 60s, to 100s, map>65, R-groin site is stable no hematoma and bleeding noted and CMS intact.  While upset had episode of HTN and tachycardia, resolved after calming done and speaking with spouse over the phone.  Gi/Gu: On regular diet, takes oral pills without difficulties and voided spontaneously.  Plan: Transition cares to floor, maintain safety and keep bed-alarm on.  For vital signs and complete assessments, please see documentation flowsheets.        70

## 2023-10-09 ENCOUNTER — APPOINTMENT (OUTPATIENT)
Dept: OCCUPATIONAL THERAPY | Facility: CLINIC | Age: 73
DRG: 038 | End: 2023-10-09
Payer: MEDICARE

## 2023-10-09 ENCOUNTER — APPOINTMENT (OUTPATIENT)
Dept: PHYSICAL THERAPY | Facility: CLINIC | Age: 73
DRG: 038 | End: 2023-10-09
Payer: MEDICARE

## 2023-10-09 LAB
ANION GAP SERPL CALCULATED.3IONS-SCNC: 7 MMOL/L (ref 7–15)
BUN SERPL-MCNC: 22.1 MG/DL (ref 8–23)
CALCIUM SERPL-MCNC: 8.5 MG/DL (ref 8.8–10.2)
CHLORIDE SERPL-SCNC: 105 MMOL/L (ref 98–107)
CREAT SERPL-MCNC: 0.48 MG/DL (ref 0.51–0.95)
DEPRECATED HCO3 PLAS-SCNC: 23 MMOL/L (ref 22–29)
EGFRCR SERPLBLD CKD-EPI 2021: >90 ML/MIN/1.73M2
ERYTHROCYTE [DISTWIDTH] IN BLOOD BY AUTOMATED COUNT: 13 % (ref 10–15)
GLUCOSE SERPL-MCNC: 211 MG/DL (ref 70–99)
HCT VFR BLD AUTO: 31 % (ref 35–47)
HGB BLD-MCNC: 10.2 G/DL (ref 11.7–15.7)
MCH RBC QN AUTO: 27.9 PG (ref 26.5–33)
MCHC RBC AUTO-ENTMCNC: 32.9 G/DL (ref 31.5–36.5)
MCV RBC AUTO: 85 FL (ref 78–100)
PLATELET # BLD AUTO: 204 10E3/UL (ref 150–450)
POTASSIUM SERPL-SCNC: 3.6 MMOL/L (ref 3.4–5.3)
RBC # BLD AUTO: 3.65 10E6/UL (ref 3.8–5.2)
SODIUM SERPL-SCNC: 135 MMOL/L (ref 135–145)
WBC # BLD AUTO: 9.4 10E3/UL (ref 4–11)

## 2023-10-09 PROCEDURE — 97116 GAIT TRAINING THERAPY: CPT | Mod: GP

## 2023-10-09 PROCEDURE — 80048 BASIC METABOLIC PNL TOTAL CA: CPT

## 2023-10-09 PROCEDURE — 97750 PHYSICAL PERFORMANCE TEST: CPT | Mod: GP

## 2023-10-09 PROCEDURE — 250N000013 HC RX MED GY IP 250 OP 250 PS 637

## 2023-10-09 PROCEDURE — 250N000013 HC RX MED GY IP 250 OP 250 PS 637: Performed by: STUDENT IN AN ORGANIZED HEALTH CARE EDUCATION/TRAINING PROGRAM

## 2023-10-09 PROCEDURE — 85027 COMPLETE CBC AUTOMATED: CPT

## 2023-10-09 PROCEDURE — 36415 COLL VENOUS BLD VENIPUNCTURE: CPT

## 2023-10-09 PROCEDURE — 120N000002 HC R&B MED SURG/OB UMMC

## 2023-10-09 PROCEDURE — 97535 SELF CARE MNGMENT TRAINING: CPT | Mod: GO

## 2023-10-09 PROCEDURE — 250N000012 HC RX MED GY IP 250 OP 636 PS 637: Performed by: STUDENT IN AN ORGANIZED HEALTH CARE EDUCATION/TRAINING PROGRAM

## 2023-10-09 PROCEDURE — 97530 THERAPEUTIC ACTIVITIES: CPT | Mod: GO

## 2023-10-09 RX ADMIN — CLOPIDOGREL BISULFATE 75 MG: 75 TABLET ORAL at 08:16

## 2023-10-09 RX ADMIN — DEXAMETHASONE 4 MG: 4 TABLET ORAL at 00:14

## 2023-10-09 RX ADMIN — ACETAMINOPHEN 975 MG: 325 TABLET, FILM COATED ORAL at 05:27

## 2023-10-09 RX ADMIN — DEXAMETHASONE 4 MG: 4 TABLET ORAL at 13:09

## 2023-10-09 RX ADMIN — DEXAMETHASONE 4 MG: 4 TABLET ORAL at 18:36

## 2023-10-09 RX ADMIN — ASPIRIN 325 MG ORAL TABLET 325 MG: 325 PILL ORAL at 08:16

## 2023-10-09 RX ADMIN — DEXAMETHASONE 4 MG: 4 TABLET ORAL at 05:27

## 2023-10-09 RX ADMIN — DEXAMETHASONE 4 MG: 4 TABLET ORAL at 23:49

## 2023-10-09 RX ADMIN — Medication 12.5 MG: at 03:30

## 2023-10-09 RX ADMIN — DONEPEZIL HYDROCHLORIDE 10 MG: 10 TABLET, FILM COATED ORAL at 20:15

## 2023-10-09 RX ADMIN — Medication 12.5 MG: at 21:58

## 2023-10-09 RX ADMIN — MIRTAZAPINE 7.5 MG: 7.5 TABLET, FILM COATED ORAL at 00:14

## 2023-10-09 RX ADMIN — ACETAMINOPHEN 975 MG: 325 TABLET, FILM COATED ORAL at 00:13

## 2023-10-09 RX ADMIN — MIRTAZAPINE 7.5 MG: 7.5 TABLET, FILM COATED ORAL at 21:58

## 2023-10-09 RX ADMIN — ATORVASTATIN CALCIUM 40 MG: 40 TABLET, FILM COATED ORAL at 20:15

## 2023-10-09 ASSESSMENT — ACTIVITIES OF DAILY LIVING (ADL)
ADLS_ACUITY_SCORE: 28
ADLS_ACUITY_SCORE: 26
ADLS_ACUITY_SCORE: 26
ADLS_ACUITY_SCORE: 27
ADLS_ACUITY_SCORE: 28
ADLS_ACUITY_SCORE: 28
ADLS_ACUITY_SCORE: 26
ADLS_ACUITY_SCORE: 26
ADLS_ACUITY_SCORE: 28
ADLS_ACUITY_SCORE: 26
ADLS_ACUITY_SCORE: 28
ADLS_ACUITY_SCORE: 28

## 2023-10-09 ASSESSMENT — VISUAL ACUITY
OU: NORMAL ACUITY

## 2023-10-09 NOTE — PROGRESS NOTES
Admitted/transferred from: 4E  Reason for admission/transfer: Lateral r/t bed availability   Patient status upon admission/transfer: Stable; walked over from 4E. A&O. Pain free. VSS.  Interventions: None  Plan: Transfer to  when bed available. Give seroquel w/ evening meds r/t sundowning per family request.  2 RN skin assessment: completed by Lacey ANTONY   Sexual Orientation and Gender Identity (SOGI) smartfom completed: Not Done  Result of skin assessment and interventions/actions: R. Groin site intact   Height, weight, drug calc weight: Done  Patient belongings (see Flowsheet - Adult Profile for details): Brought w/ pt  MDRO education (if applicable):  N/A

## 2023-10-09 NOTE — PLAN OF CARE
Goal Outcome Evaluation:    Major Shift Events:  Insomnia episode trouble sleeping.  Neuro: Alert to self, occasionally confused, needs remainder and re-orientation, oriented to time, place and situation.  Baseline L-facial droop, LUE weakness, with hand board (removed at 5am), follows command and can move all extremities (except L-arm).  Woke up at 5am confused and paranoid and wanted to leave the hospital, spouse was called and spoke to the patient that helped her calm down.  Up in chair with chair-alarm on.  Cards: SR 60s, AVSS.  Tachycardia with confusion. C/O mild headaches and relief with tylenol.   Resp: On room air, LS clear.  GI/: Reg diet, voids spontenously and passing flatus.  Skin: Thuan neck, R-groin site free from bleeding and hematoma and CMS intact.  Plan: Has floor transfer orders to 6A.  For vital signs and complete assessments, please see documentation flowsheets.

## 2023-10-09 NOTE — PLAN OF CARE
Neuro: Alert to self, forgetful, occasional confusion, responds to redirection, Left facial droop  Cardiac: SR 80's   Resp: On room air, Lung sounds clear   GI: Regular diet, 1 bowel movement during shift  : Continent   Skin: Right groin site CDI, Neck redness   Muscle/Mobility: Generalized weakness, Left arm weakness, left arm brace in use. Out of bed to chair, one person assist with toileting, ambulated around unit with therapy, fall precautions.  Pain: Denies pain  Lines/Drains: Right PIV x 2  PLAN: Floor orders 6A.     Patient transferred to  rm [id number]. Family at bedside, personal belongings transferred with patient.

## 2023-10-09 NOTE — PROGRESS NOTES
10/09/23 1138   Signing Clinician's Name / Credentials   Signing clinician's name / credentials SOHAM Montaño   Dynamic Gait Index (Sher and Barrett [place], 1995)   Gait Level Surface 2   Change in Gait Speed 3   Gait and Horizontal Head Turns 2   Gait with Vertical Head Turns 3   Gait and Pivot Turns 1   Step Over Obstacle 3   Step Around Obstacles 2   Steps 1   Total Dynamic Gait Index Score  (A score of 19 or less has been correlated to an increased risk of falls in community dwelling older adults, patients with vestibular disorders, and patients with MS.)   Total Score (out of 24) 17       Dynamic Gait Index (DGI):The DGI is a measure of balance during gait that is reliable and valid for the elderly and individuals with Parkinson's disease, MS, vestibular disorders, or s/p stroke. Gait assistive device used: None    Patient score: 17/24  Scores ?19/24 indicate an increased risk for falls according to Ai et al 2000  Minimal Detectable Change = 2.9 in community dwelling elderly according to Ryder et al 2011    Assessment (rationale for performing, application to patient s function & care plan): Pt demonstrated narrow DONALDO during walking and crossing over of steps. DGI was performed to evaluate dynamic balance and safe gait, pt scored 17/24 indicating fall risk. Score indicates that pt would benefit from further OP PT to address balance deficits. DGI outcomes highlighted deficits including gait and pivot turn as well as steps.   Minutes billed as physical performance test: 20    Timed Up and Go (TUG): TUG is a test of basic mobility skills. It is used to screen individuals prone to falls.  Gait assistive device used: none     Patient score 12.86 seconds  ?13.5 seconds indicate at risk for falls in older adults according to Marisa et al 2000.  ?30 seconds - indicates dependency in most ADL and mobility skills according to Posstanleyo & Harris 1991  >11.5 seconds indicate at risk for falls  in adults with Parkinson's Disease    Minimal Detectable Change for patients with Alzheimer s = 4.09 sec   Minimal Detectable Change for patients with Parkinson s Disease = 3.5 sec   according to Mushtaq & Arturo Garcia 2011    Normative Data from Katty MESA, Luis Felipe DEJESUS, Mo M, Ashley TRUJILLO, Adriana. 2017  Age                 Average Time (in seconds)  20-39                     5.9-7.4         40-59                     6.3-7.8   60-69                     7.1-9.0  70-79                     8.2-10.2  80-99                    10.0-12.7            Assessment (rationale for performing, application to patient s function & care plan): Pt demonstrated movement deficits during ambulation such as staggering gait, indicated further plan to evaluate if pt is falls risk. Pt score of 12.86s indicates low risk of falls and performing ADLs. Pt required repeated instruction to better understand test but able to successfully complete. Discussed with pt and daughter plan to progress balance and GT.  (10 minutes)

## 2023-10-09 NOTE — PROGRESS NOTES
CLINICAL NUTRITION SERVICES - ASSESSMENT NOTE     Nutrition Prescription    Malnutrition Status:    Unable to determine due to unable to obtain nutrition hx and NFPE at this time d/t pt out of room or busy with cares    Recommendations already ordered by Registered Dietitian (RD):  Supplements: Ensure Enlive - offer with meals TID    Future/Additional Recommendations:  -Obtain NFPE and wt history as able.  -Monitor PO adequacy, used of ONS, wt trends      REASON FOR ASSESSMENT  Crys Gama is a/an 73 year old female assessed by the dietitian for Admission Nutrition Risk Screen for positive (unsure wt loss)    NUTRITION HISTORY  Pt not previously known to Clinical Nutrition.     No food allergies noted per chart.    Attempted to meet with pt at bedside, but pt out of room on first attempt, then busy with nursing cares at later attempt.     CURRENT NUTRITION ORDERS  Diet: Regular - per SLP  Intake/Tolerance: 100% meals so far this admission. Ordered 1314 kcal and 62 g protein yesterday per [employer] meal ordering which is .     LABS  Labs reviewed    MEDICATIONS  Medications reviewed    ANTHROPOMETRICS  Height: 0 cm (Data Unavailable)  Most Recent Weight: 54.9 kg (121 lb 0.5 oz)    IBW: 47.7 kg   BMI: 22.87 kg/m2; Normal BMI  Weight History: ~9% wt loss over past ~3 months - unclear if intended or not (suspect unintended given H&P report of decline in health recently)  Wt Readings from Last 20 Encounters:   10/09/23 54.9 kg (121 lb 0.5 oz)   08/18/23 60 kg (132 lb 4.4 oz)   07/12/14 76.2 kg (168 lb)   02/20/13 79 kg (174 lb 4 oz)   08/29/12 77.6 kg (171 lb)   08/14/12 77.7 kg (171 lb 6 oz)   05/08/12 76.7 kg (169 lb 3.2 oz)   05/04/11 72.1 kg (159 lb)   12/01/10 71.1 kg (156 lb 12.8 oz)   04/28/10 83.9 kg (185 lb)   10/30/09 85.3 kg (188 lb)     Per Care Everywhere:  60.3 kg (133 lb) 07/11/2023 3:19 PM CDT     57.3 kg (126 lb 6.4 oz) 05/11/2023 1:52 PM CDT     62 kg (136 lb 11.2 oz) 01/13/2023 9:49 AM CST      Dosing Weight: 51 kg (actual, based on lowest wt this admit of 50.8 kg)    ASSESSED NUTRITION NEEDS  Estimated Energy Needs: 5999-6839 kcals/day (25 - 30 kcals/kg)  Justification: Maintenance  Estimated Protein Needs: 61-77 grams protein/day (1.2 - 1.5 grams of pro/kg)  Justification: Hypercatabolism with critical illness  Estimated Fluid Needs: 1 mL/kcal   Justification: Maintenance or Per provider pending fluid status    PHYSICAL FINDINGS  See malnutrition section below.    MALNUTRITION  % Intake: Unable to assess  % Weight Loss: > 7.5% in 3 months (severe)  Subcutaneous Fat Loss: Unable to assess  Muscle Loss: Unable to assess  Fluid Accumulation/Edema: None noted per chart  Malnutrition Diagnosis: Unable to determine due to unable to obtain nutrition hx and NFPE at this time d/t pt out of room or busy with cares    NUTRITION DIAGNOSIS  Predicted inadequate nutrient intake (pro/kcal) related to potential for PO to fall <% meals pending medical course and LOS.     INTERVENTIONS  Implementation  -Nutrition Education: Unable to complete due to pt not available.   -Medical food supplement therapy: PRN    Goals  Total avg nutritional intake to meet a minimum of 25 kcal/kg and 1.5 g PRO/kg daily (per dosing wt 51 kg).     Monitoring/Evaluation  Progress toward goals will be monitored and evaluated per protocol.  Mariya Ricks RD, LD  Pager: [phone]

## 2023-10-10 ENCOUNTER — APPOINTMENT (OUTPATIENT)
Dept: OCCUPATIONAL THERAPY | Facility: CLINIC | Age: 73
DRG: 038 | End: 2023-10-10
Payer: MEDICARE

## 2023-10-10 ENCOUNTER — APPOINTMENT (OUTPATIENT)
Dept: MRI IMAGING | Facility: CLINIC | Age: 73
DRG: 038 | End: 2023-10-10
Payer: MEDICARE

## 2023-10-10 LAB
ANION GAP SERPL CALCULATED.3IONS-SCNC: 9 MMOL/L (ref 7–15)
BUN SERPL-MCNC: 20.6 MG/DL (ref 8–23)
CALCIUM SERPL-MCNC: 8.9 MG/DL (ref 8.8–10.2)
CHLORIDE SERPL-SCNC: 107 MMOL/L (ref 98–107)
CREAT SERPL-MCNC: 0.46 MG/DL (ref 0.51–0.95)
DEPRECATED HCO3 PLAS-SCNC: 25 MMOL/L (ref 22–29)
EGFRCR SERPLBLD CKD-EPI 2021: >90 ML/MIN/1.73M2
ERYTHROCYTE [DISTWIDTH] IN BLOOD BY AUTOMATED COUNT: 13.1 % (ref 10–15)
GLUCOSE BLDC GLUCOMTR-MCNC: 316 MG/DL (ref 70–99)
GLUCOSE BLDC GLUCOMTR-MCNC: 317 MG/DL (ref 70–99)
GLUCOSE SERPL-MCNC: 189 MG/DL (ref 70–99)
HCT VFR BLD AUTO: 34.8 % (ref 35–47)
HGB BLD-MCNC: 11.6 G/DL (ref 11.7–15.7)
MCH RBC QN AUTO: 28.2 PG (ref 26.5–33)
MCHC RBC AUTO-ENTMCNC: 33.3 G/DL (ref 31.5–36.5)
MCV RBC AUTO: 85 FL (ref 78–100)
PLATELET # BLD AUTO: 262 10E3/UL (ref 150–450)
POTASSIUM SERPL-SCNC: 4 MMOL/L (ref 3.4–5.3)
RBC # BLD AUTO: 4.11 10E6/UL (ref 3.8–5.2)
SODIUM SERPL-SCNC: 141 MMOL/L (ref 135–145)
WBC # BLD AUTO: 10.7 10E3/UL (ref 4–11)

## 2023-10-10 PROCEDURE — 250N000013 HC RX MED GY IP 250 OP 250 PS 637

## 2023-10-10 PROCEDURE — G1010 CDSM STANSON: HCPCS

## 2023-10-10 PROCEDURE — 250N000012 HC RX MED GY IP 250 OP 636 PS 637

## 2023-10-10 PROCEDURE — 70551 MRI BRAIN STEM W/O DYE: CPT | Mod: 26 | Performed by: RADIOLOGY

## 2023-10-10 PROCEDURE — 250N000011 HC RX IP 250 OP 636: Performed by: STUDENT IN AN ORGANIZED HEALTH CARE EDUCATION/TRAINING PROGRAM

## 2023-10-10 PROCEDURE — 250N000012 HC RX MED GY IP 250 OP 636 PS 637: Performed by: STUDENT IN AN ORGANIZED HEALTH CARE EDUCATION/TRAINING PROGRAM

## 2023-10-10 PROCEDURE — G1010 CDSM STANSON: HCPCS | Mod: GC | Performed by: RADIOLOGY

## 2023-10-10 PROCEDURE — 250N000013 HC RX MED GY IP 250 OP 250 PS 637: Performed by: STUDENT IN AN ORGANIZED HEALTH CARE EDUCATION/TRAINING PROGRAM

## 2023-10-10 PROCEDURE — 80048 BASIC METABOLIC PNL TOTAL CA: CPT

## 2023-10-10 PROCEDURE — 36415 COLL VENOUS BLD VENIPUNCTURE: CPT

## 2023-10-10 PROCEDURE — 120N000002 HC R&B MED SURG/OB UMMC

## 2023-10-10 PROCEDURE — 97110 THERAPEUTIC EXERCISES: CPT | Mod: GO | Performed by: OCCUPATIONAL THERAPIST

## 2023-10-10 PROCEDURE — 85027 COMPLETE CBC AUTOMATED: CPT

## 2023-10-10 PROCEDURE — 97535 SELF CARE MNGMENT TRAINING: CPT | Mod: GO | Performed by: OCCUPATIONAL THERAPIST

## 2023-10-10 RX ORDER — DEXAMETHASONE 1 MG
1 TABLET ORAL EVERY 12 HOURS
Status: CANCELLED | OUTPATIENT
Start: 2023-10-12

## 2023-10-10 RX ORDER — DEXAMETHASONE 2 MG/1
2 TABLET ORAL EVERY 12 HOURS
Status: CANCELLED | OUTPATIENT
Start: 2023-10-10

## 2023-10-10 RX ORDER — DEXTROSE MONOHYDRATE 25 G/50ML
25-50 INJECTION, SOLUTION INTRAVENOUS
Status: DISCONTINUED | OUTPATIENT
Start: 2023-10-10 | End: 2023-10-13 | Stop reason: HOSPADM

## 2023-10-10 RX ORDER — LORAZEPAM 2 MG/ML
1 INJECTION INTRAMUSCULAR
Status: COMPLETED | OUTPATIENT
Start: 2023-10-10 | End: 2023-10-10

## 2023-10-10 RX ORDER — DEXAMETHASONE 0.5 MG/1
0.5 TABLET ORAL DAILY
Status: DISCONTINUED | OUTPATIENT
Start: 2023-10-16 | End: 2023-10-13 | Stop reason: HOSPADM

## 2023-10-10 RX ORDER — DEXAMETHASONE 0.5 MG/1
1 TABLET ORAL DAILY
Status: DISCONTINUED | OUTPATIENT
Start: 2023-10-14 | End: 2023-10-10

## 2023-10-10 RX ORDER — DEXAMETHASONE 0.5 MG/1
0.5 TABLET ORAL DAILY
Status: CANCELLED | OUTPATIENT
Start: 2023-10-16

## 2023-10-10 RX ORDER — DEXAMETHASONE 1 MG
1 TABLET ORAL DAILY
Status: DISCONTINUED | OUTPATIENT
Start: 2023-10-14 | End: 2023-10-13 | Stop reason: HOSPADM

## 2023-10-10 RX ORDER — NICOTINE POLACRILEX 4 MG
15-30 LOZENGE BUCCAL
Status: DISCONTINUED | OUTPATIENT
Start: 2023-10-10 | End: 2023-10-13 | Stop reason: HOSPADM

## 2023-10-10 RX ORDER — DEXAMETHASONE 2 MG/1
2 TABLET ORAL EVERY 12 HOURS SCHEDULED
Status: COMPLETED | OUTPATIENT
Start: 2023-10-10 | End: 2023-10-11

## 2023-10-10 RX ORDER — DEXAMETHASONE 0.5 MG/1
1 TABLET ORAL EVERY 12 HOURS SCHEDULED
Status: DISCONTINUED | OUTPATIENT
Start: 2023-10-12 | End: 2023-10-10

## 2023-10-10 RX ORDER — ASPIRIN 81 MG/1
81 TABLET, CHEWABLE ORAL DAILY
Status: DISCONTINUED | OUTPATIENT
Start: 2023-10-11 | End: 2023-10-13 | Stop reason: HOSPADM

## 2023-10-10 RX ORDER — DEXAMETHASONE 0.5 MG/1
0.5 TABLET ORAL DAILY
Status: DISCONTINUED | OUTPATIENT
Start: 2023-10-16 | End: 2023-10-10

## 2023-10-10 RX ORDER — DEXAMETHASONE 2 MG/1
2 TABLET ORAL EVERY 12 HOURS SCHEDULED
Status: DISCONTINUED | OUTPATIENT
Start: 2023-10-10 | End: 2023-10-10

## 2023-10-10 RX ORDER — DEXAMETHASONE 1 MG
1 TABLET ORAL EVERY 12 HOURS SCHEDULED
Status: DISCONTINUED | OUTPATIENT
Start: 2023-10-12 | End: 2023-10-13 | Stop reason: HOSPADM

## 2023-10-10 RX ORDER — DEXAMETHASONE 1 MG
1 TABLET ORAL DAILY
Status: CANCELLED | OUTPATIENT
Start: 2023-10-14

## 2023-10-10 RX ORDER — CLOPIDOGREL BISULFATE 75 MG/1
75 TABLET ORAL DAILY
Status: DISCONTINUED | OUTPATIENT
Start: 2023-10-11 | End: 2023-10-13 | Stop reason: HOSPADM

## 2023-10-10 RX ADMIN — DEXAMETHASONE 4 MG: 4 TABLET ORAL at 05:23

## 2023-10-10 RX ADMIN — CLOPIDOGREL BISULFATE 75 MG: 75 TABLET ORAL at 07:42

## 2023-10-10 RX ADMIN — INSULIN ASPART 8 UNITS: 100 INJECTION, SOLUTION INTRAVENOUS; SUBCUTANEOUS at 17:40

## 2023-10-10 RX ADMIN — DONEPEZIL HYDROCHLORIDE 10 MG: 10 TABLET, FILM COATED ORAL at 19:38

## 2023-10-10 RX ADMIN — Medication 12.5 MG: at 21:29

## 2023-10-10 RX ADMIN — ASPIRIN 325 MG ORAL TABLET 325 MG: 325 PILL ORAL at 07:42

## 2023-10-10 RX ADMIN — DEXAMETHASONE 2 MG: 2 TABLET ORAL at 19:39

## 2023-10-10 RX ADMIN — DEXAMETHASONE 2 MG: 2 TABLET ORAL at 14:20

## 2023-10-10 RX ADMIN — ATORVASTATIN CALCIUM 40 MG: 40 TABLET, FILM COATED ORAL at 19:38

## 2023-10-10 RX ADMIN — LORAZEPAM 1 MG: 2 INJECTION INTRAMUSCULAR; INTRAVENOUS at 16:47

## 2023-10-10 RX ADMIN — MIRTAZAPINE 7.5 MG: 7.5 TABLET, FILM COATED ORAL at 21:29

## 2023-10-10 ASSESSMENT — ACTIVITIES OF DAILY LIVING (ADL)
ADLS_ACUITY_SCORE: 27
ADLS_ACUITY_SCORE: 26
ADLS_ACUITY_SCORE: 27
ADLS_ACUITY_SCORE: 26
ADLS_ACUITY_SCORE: 27
ADLS_ACUITY_SCORE: 26
ADLS_ACUITY_SCORE: 27

## 2023-10-10 NOTE — PROGRESS NOTES
Brief Clinical Note:  Patient had missed one of her Plavix doses. There is an incorrect number of pills left. The daughter is not sure when or how many she may have missed but could be just 1 dose.     Updated plan   -allergy test upon discharge for Nickel  -Continue aspirin/ Plavix therapy for now  -Taper steroids   - Follow-up with Dr. Bryan in 1 month.   -Plan for rehab placement.     Tracy Doty MD  Endovascular Surgical Neuroradiology Fellow  [facility]  [phone]    Endovascular Surgical Neuroradiology staff is Dr. Bryan

## 2023-10-10 NOTE — PLAN OF CARE
ICU End of Shift Summary. See flowsheets for vital signs and detailed assessment.    Changes this shift: No acute events this shift, VSS on RA. Up with SBA, independent with hygiene cares and able to make needs known. Decadron taper updated in eMAR. MRI completed this evening.  and 317 upon 1630 check - Neuro IR notified. Sliding scale ordered.    Plan: Discharge plan in progress - TCU referrals placed per social work.    Goal Outcome Evaluation:      Plan of Care Reviewed With: patient, child, spouse    Overall Patient Progress: improvingOverall Patient Progress: improving    Outcome Evaluation: VSS. No acute events

## 2023-10-10 NOTE — PROGRESS NOTES
Care Management Follow Up    Length of Stay (days): 4    Expected Discharge Date:  TBD      Concerns to be Addressed:     discharge planning.     Neurology team has asked to assist patient with ARU placement.   Steroids will be tapered to stop occurences of hallucination.    Referral sent to EDILSON Gama to follow up with the patient.     Per communication with therapist, patient is aware of her mild dementia. She shared with the therapist that her family is researching facility with ILF and Memory care to accommodate patient and her  for the long term plan. She is open to TCU.   Denise Herrera, MSN, MA, CCM, RN  4C/4A ICU Care Coordinator  Phone:[phone]  Pager: [phone]    For Weekend & Holiday on call RN Care Coordinator:  [facility] ([phone]) Saturday & Sunday; ([phone]) [facility] Recognized Holidays   Pager: [phone] Units: 4A, 4C, 4E, 6A.

## 2023-10-10 NOTE — PLAN OF CARE
ICU End of Shift Summary. See flowsheets for vital signs and detailed assessment.    Changes this shift: Alert, disoriented to time occasionally. Forgetful at times. PRN seroquel given with evening meds. Delirium precautions with do not disturb orders in place for 1231-5952. Pt slept from midnight until 0400. At about 0440 pt became restless/agitated, wanting to leave, pt refusing to call family. Writer walked pt through breathing exercises and pt up to chair and back to bed several times. After about 1.5 hr patient began calming down. VSS on RA, denies pain, denies SOB. Ambulated to toilet x3, two small BM overnight.     Plan:  Transfer to  when bed available. Continue delirium precautions.     Goal Outcome Evaluation:      Plan of Care Reviewed With: patient    Overall Patient Progress: improvingOverall Patient Progress: improving    Outcome Evaluation: VSS on RA. Alert, forgetful. Neuro unchanged

## 2023-10-10 NOTE — PROGRESS NOTES
Neuro Interventional Progress Note    10/10/2023    Crys Gama is a 73 year old year old female patient admitted on 10/6/2023 with left sided facial droop and arm weakness, she had a history of a right ICA flow diverter stent placed for an incidental aneurysm. She then developed multiple right sided strokes over the course of a few months.       Problem List  1. Right MCA territory strokes with left sided weakness.       24 hour events:  Had a run of agitation overnight    24 Hour Vital Signs Summary:  Temperatures:  Current - Temp: 98.4  F (36.9  C); Max - Temp  Av.2  F (36.8  C)  Min: 97.9  F (36.6  C)  Max: 98.4  F (36.9  C)  Respiration range: Resp  Av.8  Min: 7  Max: 31  Pulse range: Pulse  Av.7  Min: 65  Max: 156  Blood pressure range: Systolic (24hrs), Av , Min:96 , Max:177   ; Diastolic (24hrs), Av, Min:70, Max:110    Pulse oximetry range: SpO2  Av.4 %  Min: 95 %  Max: 100 %    Current Medications:   aspirin  81 mg Oral Daily    atorvastatin  40 mg Oral or Feeding Tube QPM    dexAMETHasone  4 mg Oral Q6H COOKIE    Followed by    dexAMETHasone  2 mg Oral BID    Followed by    [START ON 10/12/2023] dexAMETHasone  1 mg Oral BID    Followed by    [START ON 10/14/2023] dexAMETHasone  1 mg Oral Daily    donepezil  10 mg Oral or Feeding Tube QPM    mirtazapine  7.5 mg Oral or Feeding Tube At Bedtime    sodium chloride (PF)  3 mL Intracatheter Q8H    ticagrelor  180 mg Oral Once    ticagrelor  60 mg Oral BID       PRN Medications:  acetaminophen, - MEDICATION INSTRUCTIONS -, labetalol **OR** hydrALAZINE, lidocaine 4%, lidocaine (buffered or not buffered), - MEDICATION INSTRUCTIONS -, - MEDICATION INSTRUCTIONS -, - MEDICATION INSTRUCTIONS -, ondansetron **OR** ondansetron, prochlorperazine **OR** prochlorperazine **OR** prochlorperazine, QUEtiapine, sodium chloride (PF)    Infusions:   - MEDICATION INSTRUCTIONS -      - MEDICATION INSTRUCTIONS -      - MEDICATION INSTRUCTIONS -      -  MEDICATION INSTRUCTIONS -         Allergies   Allergen Reactions    Azithromycin Hives    Erythromycin Hives    Pcn [Penicillins] Hives    Tetracycline Hives       Physical Examination:    Mental:Awake, alert, attentive, oriented to self, time, place, and circumstance. Language is fluent and coherent with intact comprehension of complex and crossed commands, naming, and repetition. No visuospatial hemineglect.  Cranial Nerves: VFF,EOMI,  face symmetric, tongue midline, no dysarthria, equal shoulder shrug  Motor: No drift in 3/4 extremities, there is 3/5 weakness in left arm abduction, 2/5 in finger extension and 1/5 in finger flexion. Arm extension and flexion are 3/5, no abnormal movements  Sensory: Sensation intact to light touch in 4/4 extremities. No hemisensory neglect.  Cerebellar: No ataxia or dysmetria to finger-to-nose or heel-to-shin testing  Gait: deferred    Labs/Studies:  Recent Labs   Lab Test 10/08/23  0336 10/07/23  1108 10/07/23  0558 10/06/23  1458 10/05/23  1516 08/17/23  1958 08/17/23  1029   NA  --   --  142 142  --   --  141   POTASSIUM  --   --  4.0 4.3  --   --  3.9   CHLORIDE  --   --  108* 107  --   --  105   CO2  --   --  25 26  --   --  26   ANIONGAP  --   --  9 9  --   --  10   * 100* 112* 99  --    < > 99   BUN  --   --  13.7 21.8  --   --  20.3   CR  --   --  0.48* 0.55 0.6  --  0.62   FRANK  --   --  8.7* 9.1  --   --  9.2   WBC  --   --  4.7 4.4  --   --  4.0   RBC  --   --  4.25 4.40  --   --  4.34   HGB  --   --  11.7 12.2  --   --  12.2   PLT  --   --  187 203  --   --  201    < > = values in this interval not displayed.       Recent Labs   Lab Test 10/06/23  1458   INR 0.98   PTT 26         No lab results found in last 7 days.    Assessment/Plan  Left sided weakness- secondary to ischemic strokes in right ICA territory. She had a nickel based flow diverter stent placed- possible etiologies of stroke could be stent malposition or acute allergic reaction to the stent material  resulting in inflammatory response. She is status post angioplasty.  Plan:  -transfer to floor  -Q4h neurochecks  -PRN seroquel at night   -Delirium precautions  - Transition to aspirin 81mg daily and Brilinta 60mg BID  -Loading dose of 180mg Brilinta   -Continue steroids Decadron 4mg with taper.   - discharge when able   - MRI brain WWO   Tracy Doty MD  Endovascular Surgical Neuroradiology Fellow  [facility]  [phone]    Endovascular Surgical Neuroradiology staff is Dr. Johnson

## 2023-10-11 ENCOUNTER — APPOINTMENT (OUTPATIENT)
Dept: OCCUPATIONAL THERAPY | Facility: CLINIC | Age: 73
DRG: 038 | End: 2023-10-11
Payer: MEDICARE

## 2023-10-11 ENCOUNTER — APPOINTMENT (OUTPATIENT)
Dept: PHYSICAL THERAPY | Facility: CLINIC | Age: 73
DRG: 038 | End: 2023-10-11
Payer: MEDICARE

## 2023-10-11 LAB
ANION GAP SERPL CALCULATED.3IONS-SCNC: 8 MMOL/L (ref 7–15)
BUN SERPL-MCNC: 26.1 MG/DL (ref 8–23)
CALCIUM SERPL-MCNC: 8.3 MG/DL (ref 8.8–10.2)
CHLORIDE SERPL-SCNC: 107 MMOL/L (ref 98–107)
CREAT SERPL-MCNC: 0.49 MG/DL (ref 0.51–0.95)
DEPRECATED HCO3 PLAS-SCNC: 26 MMOL/L (ref 22–29)
EGFRCR SERPLBLD CKD-EPI 2021: >90 ML/MIN/1.73M2
ERYTHROCYTE [DISTWIDTH] IN BLOOD BY AUTOMATED COUNT: 13.2 % (ref 10–15)
GLUCOSE BLDC GLUCOMTR-MCNC: 171 MG/DL (ref 70–99)
GLUCOSE BLDC GLUCOMTR-MCNC: 211 MG/DL (ref 70–99)
GLUCOSE BLDC GLUCOMTR-MCNC: 230 MG/DL (ref 70–99)
GLUCOSE BLDC GLUCOMTR-MCNC: 241 MG/DL (ref 70–99)
GLUCOSE SERPL-MCNC: 220 MG/DL (ref 70–99)
HCT VFR BLD AUTO: 30.8 % (ref 35–47)
HGB BLD-MCNC: 10.1 G/DL (ref 11.7–15.7)
MCH RBC QN AUTO: 27.4 PG (ref 26.5–33)
MCHC RBC AUTO-ENTMCNC: 32.8 G/DL (ref 31.5–36.5)
MCV RBC AUTO: 84 FL (ref 78–100)
METHYLMALONATE SERPL-SCNC: 0.12 UMOL/L (ref 0–0.4)
PLATELET # BLD AUTO: 235 10E3/UL (ref 150–450)
POTASSIUM SERPL-SCNC: 4 MMOL/L (ref 3.4–5.3)
RBC # BLD AUTO: 3.69 10E6/UL (ref 3.8–5.2)
SODIUM SERPL-SCNC: 141 MMOL/L (ref 135–145)
WBC # BLD AUTO: 7.1 10E3/UL (ref 4–11)

## 2023-10-11 PROCEDURE — 120N000002 HC R&B MED SURG/OB UMMC

## 2023-10-11 PROCEDURE — 250N000013 HC RX MED GY IP 250 OP 250 PS 637: Performed by: STUDENT IN AN ORGANIZED HEALTH CARE EDUCATION/TRAINING PROGRAM

## 2023-10-11 PROCEDURE — 97530 THERAPEUTIC ACTIVITIES: CPT | Mod: GO

## 2023-10-11 PROCEDURE — 250N000012 HC RX MED GY IP 250 OP 636 PS 637: Performed by: STUDENT IN AN ORGANIZED HEALTH CARE EDUCATION/TRAINING PROGRAM

## 2023-10-11 PROCEDURE — 36415 COLL VENOUS BLD VENIPUNCTURE: CPT

## 2023-10-11 PROCEDURE — 80048 BASIC METABOLIC PNL TOTAL CA: CPT

## 2023-10-11 PROCEDURE — 97116 GAIT TRAINING THERAPY: CPT | Mod: GP

## 2023-10-11 PROCEDURE — 97535 SELF CARE MNGMENT TRAINING: CPT | Mod: GO

## 2023-10-11 PROCEDURE — 250N000013 HC RX MED GY IP 250 OP 250 PS 637

## 2023-10-11 PROCEDURE — 97112 NEUROMUSCULAR REEDUCATION: CPT | Mod: GP

## 2023-10-11 PROCEDURE — 250N000011 HC RX IP 250 OP 636: Mod: JZ

## 2023-10-11 PROCEDURE — 85027 COMPLETE CBC AUTOMATED: CPT

## 2023-10-11 RX ADMIN — CLOPIDOGREL BISULFATE 75 MG: 75 TABLET ORAL at 08:09

## 2023-10-11 RX ADMIN — DONEPEZIL HYDROCHLORIDE 10 MG: 10 TABLET, FILM COATED ORAL at 20:38

## 2023-10-11 RX ADMIN — ASPIRIN 81 MG CHEWABLE TABLET 81 MG: 81 TABLET CHEWABLE at 08:09

## 2023-10-11 RX ADMIN — INSULIN ASPART 3 UNITS: 100 INJECTION, SOLUTION INTRAVENOUS; SUBCUTANEOUS at 11:43

## 2023-10-11 RX ADMIN — INSULIN ASPART 2 UNITS: 100 INJECTION, SOLUTION INTRAVENOUS; SUBCUTANEOUS at 08:14

## 2023-10-11 RX ADMIN — ATORVASTATIN CALCIUM 40 MG: 40 TABLET, FILM COATED ORAL at 20:38

## 2023-10-11 RX ADMIN — DEXAMETHASONE 2 MG: 2 TABLET ORAL at 20:38

## 2023-10-11 RX ADMIN — ACETAMINOPHEN 975 MG: 325 TABLET, FILM COATED ORAL at 14:15

## 2023-10-11 RX ADMIN — MIRTAZAPINE 7.5 MG: 7.5 TABLET, FILM COATED ORAL at 23:14

## 2023-10-11 RX ADMIN — DEXAMETHASONE 2 MG: 2 TABLET ORAL at 08:09

## 2023-10-11 RX ADMIN — ONDANSETRON 4 MG: 2 INJECTION INTRAMUSCULAR; INTRAVENOUS at 20:56

## 2023-10-11 ASSESSMENT — ACTIVITIES OF DAILY LIVING (ADL)
ADLS_ACUITY_SCORE: 29
ADLS_ACUITY_SCORE: 26
ADLS_ACUITY_SCORE: 29
ADLS_ACUITY_SCORE: 29
ADLS_ACUITY_SCORE: 26
ADLS_ACUITY_SCORE: 29
ADLS_ACUITY_SCORE: 26
ADLS_ACUITY_SCORE: 29
ADLS_ACUITY_SCORE: 27
ADLS_ACUITY_SCORE: 29
ADLS_ACUITY_SCORE: 26
ADLS_ACUITY_SCORE: 29

## 2023-10-11 NOTE — PROGRESS NOTES
Neuro Interventional Progress Note    10/11/2023    Crys Gama is a 73 year old year old female patient admitted on 10/6/2023 with left sided facial droop and arm weakness, she had a history of a right ICA flow diverter stent placed for an incidental aneurysm. She then developed multiple right sided strokes over the course of a few months.       Problem List  1. Right MCA territory strokes with left sided weakness.       24 hour events:  Improved sleep overnight with eye covers and ear plugs.     24 Hour Vital Signs Summary:  Temperatures:  Current - Temp: 98.4  F (36.9  C); Max - Temp  Av.2  F (36.8  C)  Min: 97.9  F (36.6  C)  Max: 98.4  F (36.9  C)  Respiration range: Resp  Av.8  Min: 7  Max: 31  Pulse range: Pulse  Av.7  Min: 65  Max: 156  Blood pressure range: Systolic (24hrs), Av , Min:96 , Max:177   ; Diastolic (24hrs), Av, Min:70, Max:110    Pulse oximetry range: SpO2  Av.4 %  Min: 95 %  Max: 100 %    Current Medications:   aspirin  81 mg Oral Daily    atorvastatin  40 mg Oral or Feeding Tube QPM    clopidogrel  75 mg Oral or Feeding Tube Daily    dexAMETHasone  2 mg Oral Q12H [facility] ()    Followed by    [START ON 10/12/2023] dexAMETHasone  1 mg Oral Q12H [facility] ()    Followed by    [START ON 10/14/2023] dexAMETHasone  1 mg Oral Daily    Followed by    [START ON 10/16/2023] dexAMETHasone  0.5 mg Oral Daily    donepezil  10 mg Oral or Feeding Tube QPM    insulin aspart  1-10 Units Subcutaneous TID AC    insulin aspart  1-7 Units Subcutaneous At Bedtime    mirtazapine  7.5 mg Oral or Feeding Tube At Bedtime    sodium chloride (PF)  3 mL Intracatheter Q8H       PRN Medications:  acetaminophen, - MEDICATION INSTRUCTIONS -, glucose **OR** dextrose **OR** glucagon, labetalol **OR** hydrALAZINE, lidocaine 4%, lidocaine (buffered or not buffered), - MEDICATION INSTRUCTIONS -, - MEDICATION INSTRUCTIONS -, - MEDICATION INSTRUCTIONS -, ondansetron **OR** ondansetron,  prochlorperazine **OR** prochlorperazine **OR** prochlorperazine, QUEtiapine, sodium chloride (PF)    Infusions:   - MEDICATION INSTRUCTIONS -      - MEDICATION INSTRUCTIONS -      - MEDICATION INSTRUCTIONS -      - MEDICATION INSTRUCTIONS -         Allergies   Allergen Reactions    Azithromycin Hives    Erythromycin Hives    Pcn [Penicillins] Hives    Tetracycline Hives       Physical Examination:    Mental:Awake, alert, attentive, oriented to self, time, place, and circumstance. Language is fluent and coherent with intact comprehension of complex and crossed commands, naming, and repetition. No visuospatial hemineglect.  Cranial Nerves: VFF,EOMI,  face symmetric, tongue midline, no dysarthria, equal shoulder shrug  Motor: No drift in 3/4 extremities, there is 4/5 weakness in left arm abduction, 3/5 in finger extension and 2/5 in finger flexion. Arm extension and flexion are 3/5, no abnormal movements  Sensory: Sensation intact to light touch in 4/4 extremities. No hemisensory neglect.  Cerebellar: No ataxia or dysmetria to finger-to-nose or heel-to-shin testing  Gait: deferred    Labs/Studies:  Recent Labs   Lab Test 10/08/23  0336 10/07/23  1108 10/07/23  0558 10/06/23  1458 10/05/23  1516 08/17/23  1958 08/17/23  1029   NA  --   --  142 142  --   --  141   POTASSIUM  --   --  4.0 4.3  --   --  3.9   CHLORIDE  --   --  108* 107  --   --  105   CO2  --   --  25 26  --   --  26   ANIONGAP  --   --  9 9  --   --  10   * 100* 112* 99  --    < > 99   BUN  --   --  13.7 21.8  --   --  20.3   CR  --   --  0.48* 0.55 0.6  --  0.62   FRANK  --   --  8.7* 9.1  --   --  9.2   WBC  --   --  4.7 4.4  --   --  4.0   RBC  --   --  4.25 4.40  --   --  4.34   HGB  --   --  11.7 12.2  --   --  12.2   PLT  --   --  187 203  --   --  201    < > = values in this interval not displayed.       Recent Labs   Lab Test 10/06/23  1458   INR 0.98   PTT 26         No lab results found in last 7 days.    Assessment/Plan  Left sided  weakness- secondary to ischemic strokes in right ICA territory. She had a nickel based flow diverter stent placed- possible etiologies of stroke could be stent malposition or acute allergic reaction to the stent material resulting in inflammatory response. She is status post angioplasty. MRI showed new embolic infracts along the bilateral hemispheres- could be related to recent angiogram versus a cardioembolic source.   Plan:  -transfer to floor  -Q4h neurochecks  -PRN seroquel at night   -Delirium precautions  - Continue aspirin and Plavix   - Decadron with taper.   - discharge when able to either living facility or TCU   - high intensity SSI      Tracy Doty MD  Endovascular Surgical Neuroradiology Fellow  [facility]  [phone]    Endovascular Surgical Neuroradiology staff is Dr. Bryan    I have reviewed the history above and agree with the resident's assessment and plan.  JEFFREY Bryan MD

## 2023-10-11 NOTE — PLAN OF CARE
ICU End of Shift Summary. See flowsheets for vital signs and detailed assessment.    Changes this shift: PERRLA, SBA, able to make needs known and follow commands, LUE weakness @ baseline. Oriented to self, confused, and intermittently agitated and tearful. VSS. Tolerating regular diet. Remains on sliding scale insulin. Adequate UO. 2 BM. Ambulated in hallway.     Plan: Contacted team to transfer patient to med/surg per patient and family request.

## 2023-10-11 NOTE — PLAN OF CARE
ICU End of Shift Summary. See flowsheets for vital signs and detailed assessment.    Changes this shift: VSS on RA. Denies pain. Slept well overnight from 1910-8855 with one awakening. No acute events overnight. Up with SBA. One BM overnight. BG remains elevated, MD aware, continue sliding scale.     Plan:  Delirium precautions. Discharge plan in progress.     Goal Outcome Evaluation:      Plan of Care Reviewed With: patient    Overall Patient Progress: improvingOverall Patient Progress: improving    Outcome Evaluation: VSS, no acute events. Slept well overnight.

## 2023-10-11 NOTE — PROGRESS NOTES
CLINICAL NUTRITION SERVICES - BRIEF NOTE     Nutrition Prescription     Recommendations already ordered by Registered Dietitian (RD):  Updating timing of ONS to between meals, continue TID      Future/Additional Recommendations:  Monitor PO intake/adequacy, ONS use and preferences    For last full RD assessment, see note dated 10/9/23    NEW FINDINGS   - Pt up to chair eating salad from OSH w/ daughter visiting at bedside. Both pt and her daughter confirm pt's appetite is back to baseline, eating really well TID. Pt reports that she doesn't care for meat, so PTA gets her protein from other sources and does utilize protein bars and shake. Pt would like to continue Ensure TID while admitted, will update timing per pt preference. Pt likely to have many meals of OSH food w/ visitors per pt's daughter.     -Pt reports unintentional wt loss over the past several months PTA of ~5 lbs. Several years ago pt reports losing wt intentionally. Pt reports that during this time period of unintentional wt loss PTA, she was still eating mostly as usual but that she noticed the wt loss and also fat loss on her legs, hands, and arms. RD did not do formal NFPE today as pt eating lunch at the time of visit - will complete at follow-up.     INTERVENTIONS  Implementation  Medical food supplement therapy     Monitoring/Evaluation  Will continue to monitor and evaluate per protocol.    Cathi Medina, MPH, RDN, LD  4E (Neuro ICU) RD Pager: [phone]   Ascom: [phone]  Weekend/Holiday RD pager [phone]

## 2023-10-11 NOTE — CONSULTS
Care Management Follow Up    Length of Stay (days): 5    Expected Discharge Date: unknown     Concerns to be Addressed:     discharge planning  Patient plan of care discussed at interdisciplinary rounds: Yes    Anticipated Discharge Disposition:  TCU     Anticipated Discharge Services:  PT/OT  Anticipated Discharge DME:  unknown    Patient/family educated on Medicare website which has current facility and service quality ratings:  yes  Education Provided on the Discharge Plan:  yes  Patient/Family in Agreement with the Plan:  yes    Referrals Placed by CM/SW:  [facility] [id number] [facility] [place] [address] [phone]  Private pay costs discussed: Not applicable    Additional Information:  SW support requested per bedside RN and consult placed to assist family with discharge. Per RN pt family is anxious about discharge planning and next steps and page was looking into some memory care for the patient and for Independent living for her  ( hopefully on the same campus).   ARELI met with pt and family bedside where family was wanting to know what the pt discharge plan is as they want pt to go to rehab before returning home. Pt also expressed agreeable to this plan. Pt family states they were told SW will be able to create discharge plan. SW explained to family the steps needed to determine discharge placement and needing approval from doctors to proceed. ARELI also discussed and explained the three common routes to discharge: ARU, TCU, LTC or home with outpatient. Pt and family discussed their concerns about pt going home as it relates to her memory loss and her pt-ot needs.  ARELI was able to consult with bedside RN, and PT and OT working floor that day to states pt PT needs are minimal and would not meet requirements for TCU or ARU. OT states pt rehab needs are also minimal but can support TCU recs at this time.   ARELI spoke with Neuro IR fellow Tracy who states they are recommending rehab as pt   is concerned about pt returning home. SW explained some of the admissions criteria for rehab and that with the minimal pt/ot needs of pt it may be challenging for pt to be accepted to facility.   SW also consulted with RNCC before approaching family about where to makes recs.  Based on above conversations and recs per therapies, SW approached pt and family about TCU recs. Family states that [facility] in [place] also has TCU and would like to make referral there. SW placed referral. SW also provided list of other TCU's in area should additional referrals need to be made. SW explained to family the referral process and common delays that occur.     SW will relay above to 4C SW and continue to support as needed.    EDILSON Hewitt, MARY  4A & 4E ICU   Pager: [phone]  Phone: [phone]

## 2023-10-11 NOTE — CONSULTS
Care Management Follow Up    Length of Stay (days): 5    Expected Discharge Date:       Concerns to be Addressed:       Patient plan of care discussed at interdisciplinary rounds: Yes    Anticipated Discharge Disposition: TCU     Anticipated Discharge Services:  TBD  Anticipated Discharge DME:  TBD    Patient/family educated on Medicare website which has current facility and service quality ratings:  Yes  Education Provided on the Discharge Plan:  Yes  Patient/Family in Agreement with the Plan: Yes     Referrals Placed by CM/SW:      Pending:  [facility]  [address] [place], [place]  Ph. [phone]  Fax [phone]     [facility]  [address], [place]  Ph. [phone]  Fax [phone]  *Sent via fax    Declined:   [facility]  [address] [place] [address]  P: [phone]    [facility] TCU    Private pay costs discussed: insurance costs out of pocket expenses    Additional Information:  SW received a message from [facility] that they would not have bed availability this week. SW relayed this information to patient and her daughter, Irene. Patient expressed being okay with staying at the hospital until a bed is open at [facility]. SW explained that it would be better to find more facilities they feel comfortable sending referrals to. Irene expressed understanding. Irene was able to give [facility], [facility], and [facility] TCU. She also reported being okay with patient going to an ARU and gave [facility], [facility], and [facility] ARU.     SW had the community health worker help send referrals to [facility] and [facility]. SW called [facility] admissions and spoke with the liaison for TCU and ARU. They looked through patient's chart and reported they would not able to accommodate patient with her memory care. They suggested sending referrals to TCUs with a memory care unit for patient to transfer to when she is done with TCU. ARELI will follow up with patient and her  family with this plan.     Alma Hernández [facility]  ICU   Phone: [phone]  Pager: [phone]

## 2023-10-11 NOTE — PROGRESS NOTES
Care Management    10/11: CHW sent additional referrals for TCU placement to the following    [facility]  [address]  Ph. [phone]  Fax [phone]    [address]  Ph. [phone]  Fax [phone]  *Sent via fax    CHW will continue to follow and assist as needed.    Sheree GILL  ICU & ED/OBS  Phone: [phone]

## 2023-10-12 ENCOUNTER — APPOINTMENT (OUTPATIENT)
Dept: PHYSICAL THERAPY | Facility: CLINIC | Age: 73
DRG: 038 | End: 2023-10-12
Payer: MEDICARE

## 2023-10-12 LAB
ANION GAP SERPL CALCULATED.3IONS-SCNC: 6 MMOL/L (ref 7–15)
BACTERIA BLD CULT: NO GROWTH
BACTERIA BLD CULT: NO GROWTH
BUN SERPL-MCNC: 20.3 MG/DL (ref 8–23)
CALCIUM SERPL-MCNC: 8.4 MG/DL (ref 8.8–10.2)
CHLORIDE SERPL-SCNC: 107 MMOL/L (ref 98–107)
CREAT SERPL-MCNC: 0.53 MG/DL (ref 0.51–0.95)
DEPRECATED HCO3 PLAS-SCNC: 27 MMOL/L (ref 22–29)
EGFRCR SERPLBLD CKD-EPI 2021: >90 ML/MIN/1.73M2
ERYTHROCYTE [DISTWIDTH] IN BLOOD BY AUTOMATED COUNT: 13.2 % (ref 10–15)
GLUCOSE BLDC GLUCOMTR-MCNC: 140 MG/DL (ref 70–99)
GLUCOSE BLDC GLUCOMTR-MCNC: 237 MG/DL (ref 70–99)
GLUCOSE BLDC GLUCOMTR-MCNC: 277 MG/DL (ref 70–99)
GLUCOSE BLDC GLUCOMTR-MCNC: 295 MG/DL (ref 70–99)
GLUCOSE SERPL-MCNC: 169 MG/DL (ref 70–99)
HCT VFR BLD AUTO: 32.7 % (ref 35–47)
HGB BLD-MCNC: 10.8 G/DL (ref 11.7–15.7)
MCH RBC QN AUTO: 28.1 PG (ref 26.5–33)
MCHC RBC AUTO-ENTMCNC: 33 G/DL (ref 31.5–36.5)
MCV RBC AUTO: 85 FL (ref 78–100)
PLATELET # BLD AUTO: 238 10E3/UL (ref 150–450)
POTASSIUM SERPL-SCNC: 4.6 MMOL/L (ref 3.4–5.3)
RBC # BLD AUTO: 3.85 10E6/UL (ref 3.8–5.2)
SODIUM SERPL-SCNC: 140 MMOL/L (ref 135–145)
WBC # BLD AUTO: 8.2 10E3/UL (ref 4–11)

## 2023-10-12 PROCEDURE — 037K3ZZ DILATION OF RIGHT INTERNAL CAROTID ARTERY, PERCUTANEOUS APPROACH: ICD-10-PCS | Performed by: NEUROLOGICAL SURGERY

## 2023-10-12 PROCEDURE — 120N000002 HC R&B MED SURG/OB UMMC

## 2023-10-12 PROCEDURE — 250N000013 HC RX MED GY IP 250 OP 250 PS 637

## 2023-10-12 PROCEDURE — 250N000012 HC RX MED GY IP 250 OP 636 PS 637: Performed by: STUDENT IN AN ORGANIZED HEALTH CARE EDUCATION/TRAINING PROGRAM

## 2023-10-12 PROCEDURE — 85027 COMPLETE CBC AUTOMATED: CPT

## 2023-10-12 PROCEDURE — 36415 COLL VENOUS BLD VENIPUNCTURE: CPT

## 2023-10-12 PROCEDURE — 80048 BASIC METABOLIC PNL TOTAL CA: CPT

## 2023-10-12 PROCEDURE — 250N000013 HC RX MED GY IP 250 OP 250 PS 637: Performed by: STUDENT IN AN ORGANIZED HEALTH CARE EDUCATION/TRAINING PROGRAM

## 2023-10-12 PROCEDURE — 97116 GAIT TRAINING THERAPY: CPT | Mod: GP

## 2023-10-12 PROCEDURE — 97112 NEUROMUSCULAR REEDUCATION: CPT | Mod: GP

## 2023-10-12 RX ADMIN — INSULIN ASPART 1 UNITS: 100 INJECTION, SOLUTION INTRAVENOUS; SUBCUTANEOUS at 09:19

## 2023-10-12 RX ADMIN — Medication 12.5 MG: at 01:09

## 2023-10-12 RX ADMIN — INSULIN ASPART 7 UNITS: 100 INJECTION, SOLUTION INTRAVENOUS; SUBCUTANEOUS at 16:55

## 2023-10-12 RX ADMIN — MIRTAZAPINE 7.5 MG: 7.5 TABLET, FILM COATED ORAL at 22:04

## 2023-10-12 RX ADMIN — INSULIN ASPART 4 UNITS: 100 INJECTION, SOLUTION INTRAVENOUS; SUBCUTANEOUS at 12:02

## 2023-10-12 RX ADMIN — CLOPIDOGREL BISULFATE 75 MG: 75 TABLET ORAL at 09:11

## 2023-10-12 RX ADMIN — DEXAMETHASONE 1 MG: 0.5 TABLET ORAL at 09:11

## 2023-10-12 RX ADMIN — DONEPEZIL HYDROCHLORIDE 10 MG: 10 TABLET, FILM COATED ORAL at 20:51

## 2023-10-12 RX ADMIN — ASPIRIN 81 MG CHEWABLE TABLET 81 MG: 81 TABLET CHEWABLE at 09:11

## 2023-10-12 RX ADMIN — ATORVASTATIN CALCIUM 40 MG: 40 TABLET, FILM COATED ORAL at 20:51

## 2023-10-12 RX ADMIN — DEXAMETHASONE 1 MG: 0.5 TABLET ORAL at 20:51

## 2023-10-12 ASSESSMENT — ACTIVITIES OF DAILY LIVING (ADL)
ADLS_ACUITY_SCORE: 27
DEPENDENT_IADLS:: MEDICATION MANAGEMENT;TRANSPORTATION
ADLS_ACUITY_SCORE: 27
ADLS_ACUITY_SCORE: 30
ADLS_ACUITY_SCORE: 27
ADLS_ACUITY_SCORE: 27

## 2023-10-12 NOTE — PROGRESS NOTES
Care Management Follow Up    Length of Stay (days): 6    Expected Discharge Date:  10/13/23      Concerns to be Addressed:  discharge meds, outpatient therapy     Patient plan of care discussed at interdisciplinary rounds: Yes    Anticipated Discharge Disposition:  Home with outpatient [facility]      Anticipated Discharge Services:  outpatient PT/OT  Anticipated Discharge DME:  Patient has a walker and shower chair at home.     Patient/family educated on Medicare website which has current facility and service quality ratings:  N/a   Education Provided on the Discharge Plan:  yes  Patient/Family in Agreement with the Plan:  yes  Additional Information: RNCC met with the patient and her sister and daughter at the bedside.   Since TCU of patient's choice does not have bed, family decided to discharge to home.   Patient will be staying with the family [address] [place] [address]. Patient's daughter will be staying with the patient and will assist with meds.     Patient is not using any DMEs currently and interested to have therapy outptatient.     Requested primary team to enter outpatient PT and OT orders to non [facility] providers.   Patient has steroid-induced hyperglycemia and will need blood sugar checks and insulin injections.   Requested Primary team to enter order for inpatient diabetic education and for glucometer with supplies and insulin injections and supplies.     Spoke with inpatient diabetic education department and requested patient and family teach for 10/13/23 around 10 am.     Updated patient's daughter.     Denise Herrera MSN, MA, CCM, RN  4C/4A ICU Care Coordinator  Phone:[phone]  Pager: [phone]    For Weekend & Holiday on call RN Care Coordinator:  [facility] ([phone]) Saturday & Sunday; ([phone]) [facility] Recognized Holidays   Pager: [phone] Units: 4A, 4C, 4E,  6A.

## 2023-10-12 NOTE — DISCHARGE SUMMARY
[facility]    Endovascular Surgical Neuroradiology Discharge Summary    Date of Admission: 10/6/2023  Date of Discharge: 10/12/2023    Disposition: Discharged to home  Primary Care Physician: Roxanna Hernandez      Admission Diagnosis:   Right middle cerebral artery strokes     Discharge Diagnosis:   Acute ischemic stroke of Right middle cerebral artery due to other etiology (nitinol allergy versus stenosis of right carotid artery stent. 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 Who has recurrent right middle cerebral artery strokes. She has had 3 distinct episodes with the first being left arm weakness on Labor day then 2 weeks prior to admission had left face drop then 4 day prior to arrival to ED she had left eye peripheral vision loss that hs resolved. She underwent angioplasty of her right internal carotid artery and was continued on aspirin and Plavix. She had improvement of her left arm weakness.     Please see H&P dated 10/6/2023 for further details about presentation.    Brief Hospital Course:   Patient was admitted to neurointensive care unit for overnight observation after procedure. She had an uneventful course. There were no new problems. Patient was tolerating oral diet and was ambulating independently. Her groin access site was dry and intact without any evidence of hematoma or tenderness. She was provided detailed explanation about her treatment and follow-up plan. She was discharged home in stable condition.     Complications: None.     Other problems addressed during this hospitalization:  # Hyperglycemia- steroid induced, she was placed on high intensity sliding scale insulin and then discharged with diabetes education and insulin administration instructions for family.     #Hospital delirium- she had episodes of  agitation and restlessness with sundowning at night. She was given Seroquel PRN that reduced her episodes.     PHYSICAL EXAMINATION  Patient was examined by Dr. Bryan prior to discharge.     National Institutes of Health Stroke Scale (on day of discharge)  NIHSS Total Score: 3        mRS 2 - Slight disability. Able to look after own affairs without assistance, but unable to carry out all previous activities.      Medications    Current Discharge Medication List        CONTINUE these medications which have NOT CHANGED    Details   aspirin (ASA) 325 MG tablet Please begin taking 1 tablet 5 days prior to procedure.  Qty: 30 tablet, Refills: 3    Associated Diagnoses: Cerebral aneurysm, nonruptured      atorvastatin (LIPITOR) 40 MG tablet Take 40 mg by mouth daily      cetirizine (ZYRTEC) 10 MG tablet Take 10 mg by mouth daily      clopidogrel (PLAVIX) 75 MG tablet Please begin taking 1 tablet 5 days prior to procedure.  Qty: 30 tablet, Refills: 3    Associated Diagnoses: Cerebral aneurysm, nonruptured      COLLAGEN PO       cyanocobalamin (VITAMIN B-12) 1000 MCG tablet Take 1,000 mcg by mouth daily      donepezil (ARICEPT) 10 MG tablet Take 10 mg by mouth daily      mirtazapine (REMERON) 7.5 MG tablet Take 1 tablet by mouth At Bedtime      Multiple Vitamin (MULTI-DAY PO) Take 1 tablet by mouth daily.      Vitamin D3 (CHOLECALCIFEROL) 25 mcg (1000 units) tablet Take 25 mcg by mouth daily           STOP taking these medications       simvastatin (ZOCOR) 10 MG tablet Comments:   Reason for Stopping:               Additional recommendations and follow up:  _ continue DAPT for 6 months  - Continue aspirin 325mg and Plavix 75mg daily  -Continue steroid taper- Decadron 1mg BID for 1 day, 1mg daily for 2 doses then 0.5mg daily for 2 doses.   -Repeat MRA/MRI brain WWO in 3 months  -Return to Dr. Bryan's clinic in 1 month    No discharge procedures on file.    Patient was discussed with the attending physician,   Irvin Doty MD  Endovascular Surgical Neuroradiology Fellow  [facility]  [phone]    Endovascular Surgical Neuroradiology staff is Dr. Bryan    I have reviewed the history. I have seen and examined the patient myself and agree with the assessment and plan above.  JEFFREY Bryan MD     show

## 2023-10-12 NOTE — PLAN OF CARE
Status: Pt admitted on 10/6/2023 with left sided facial droop and arm weakness, history of a right ICA flow diverter stent placed for an aneurysm, and Alzheimer's disease   Vitals: VSS on RA   Neuros: A&Ox4, forgetful, LUE weakness, wears L arm brace overnight, weak L hand grasp. N/T to LUE, 5/5 t/o ex LUE 3/5   IV: PIV SL   Labs/Electrolytes: ACHS BG checks   Resp/trach: WNL  Diet: Regular diet, good PO intake   Bowel status: LBM 10/11  : Voiding spont   Skin: No new skin deficits noted   Pain: Denied   Activity: SBA, GB  Social: Daughter at bedside-supportive   Plan: Plan for discharge home tomorrow with family, continue to monitor

## 2023-10-12 NOTE — PROGRESS NOTES
Neuro Interventional Progress Note    10/12/2023    Crys Gama is a 73 year old year old female patient admitted on 10/6/2023 with left sided facial droop and arm weakness, she had a history of right ICA flow diverters placed for an incidental aneurysm. She then developed multiple right sided strokes over the course of a few weeks.       Problem List  1. Right MCA territory strokes with left sided weakness.       24 hour events:  Improved sleep overnight with eye covers and ear plugs.     24 Hour Vital Signs Summary:  Temperatures:  Current - Temp: 98.4  F (36.9  C); Max - Temp  Av.2  F (36.8  C)  Min: 97.9  F (36.6  C)  Max: 98.4  F (36.9  C)  Respiration range: Resp  Av.8  Min: 7  Max: 31  Pulse range: Pulse  Av.7  Min: 65  Max: 156  Blood pressure range: Systolic (24hrs), Av , Min:96 , Max:177   ; Diastolic (24hrs), Av, Min:70, Max:110    Pulse oximetry range: SpO2  Av.4 %  Min: 95 %  Max: 100 %    Current Medications:   aspirin  81 mg Oral Daily    atorvastatin  40 mg Oral or Feeding Tube QPM    clopidogrel  75 mg Oral or Feeding Tube Daily    dexAMETHasone  1 mg Oral Q12H [facility] ()    Followed by    [START ON 10/14/2023] dexAMETHasone  1 mg Oral Daily    Followed by    [START ON 10/16/2023] dexAMETHasone  0.5 mg Oral Daily    donepezil  10 mg Oral or Feeding Tube QPM    insulin aspart  1-10 Units Subcutaneous TID AC    insulin aspart  1-7 Units Subcutaneous At Bedtime    mirtazapine  7.5 mg Oral or Feeding Tube At Bedtime    sodium chloride (PF)  3 mL Intracatheter Q8H       PRN Medications:  acetaminophen, - MEDICATION INSTRUCTIONS -, glucose **OR** dextrose **OR** glucagon, labetalol **OR** hydrALAZINE, lidocaine 4%, lidocaine (buffered or not buffered), - MEDICATION INSTRUCTIONS -, - MEDICATION INSTRUCTIONS -, - MEDICATION INSTRUCTIONS -, ondansetron **OR** ondansetron, prochlorperazine **OR** prochlorperazine **OR** prochlorperazine, QUEtiapine, sodium chloride  (PF)    Infusions:   - MEDICATION INSTRUCTIONS -      - MEDICATION INSTRUCTIONS -      - MEDICATION INSTRUCTIONS -      - MEDICATION INSTRUCTIONS -         Allergies   Allergen Reactions    Azithromycin Hives    Erythromycin Hives    Pcn [Penicillins] Hives    Tetracycline Hives       Physical Examination:    Mental:Awake, alert, attentive, oriented to self, time, place, and circumstance. Language is fluent and coherent with intact comprehension of complex and crossed commands, naming, and repetition. No visuospatial hemineglect.  Cranial Nerves: VFF,EOMI,  face symmetric, tongue midline, no dysarthria, equal shoulder shrug  Motor: No drift in 3/4 extremities, there is 4/5 weakness in left arm abduction, 3/5 in finger extension and 3/5 in finger flexion. Arm extension and flexion are 4/5, no abnormal movements  Sensory: Sensation intact to light touch in 4/4 extremities. No hemisensory neglect.  Cerebellar: No ataxia or dysmetria to finger-to-nose or heel-to-shin testing  Gait: deferred    Labs/Studies:  Recent Labs   Lab Test 10/08/23  0336 10/07/23  1108 10/07/23  0558 10/06/23  1458 10/05/23  1516 08/17/23  1958 08/17/23  1029   NA  --   --  142 142  --   --  141   POTASSIUM  --   --  4.0 4.3  --   --  3.9   CHLORIDE  --   --  108* 107  --   --  105   CO2  --   --  25 26  --   --  26   ANIONGAP  --   --  9 9  --   --  10   * 100* 112* 99  --    < > 99   BUN  --   --  13.7 21.8  --   --  20.3   CR  --   --  0.48* 0.55 0.6  --  0.62   FRANK  --   --  8.7* 9.1  --   --  9.2   WBC  --   --  4.7 4.4  --   --  4.0   RBC  --   --  4.25 4.40  --   --  4.34   HGB  --   --  11.7 12.2  --   --  12.2   PLT  --   --  187 203  --   --  201    < > = values in this interval not displayed.       Recent Labs   Lab Test 10/06/23  1458   INR 0.98   PTT 26         No lab results found in last 7 days.    Assessment/Plan  Left sided weakness- secondary to ischemic strokes in right ICA territory. She had a nickel based flow  diverter stent placed- possible etiologies of stroke could be stent malposition or acute allergic reaction to the stent material resulting in inflammatory response. She is status post angioplasty. MRI showed new embolic infracts along the bilateral hemispheres- could be related to recent angiogram versus a cardioembolic source.   Plan:  -transfer to floor  -Q4h neurochecks  -PRN seroquel at night   -Delirium precautions  - Continue aspirin and Plavix   - Decadron with taper.   - discharge when able to either living facility or TCU   - high intensity sliding scale insulin  -Discharge plan: Continue DAPT for 6 months  -Repeat MRA/MRI in 3 monhts  -Follow-up in clinic in 1 month.       Tracy Doty MD  Endovascular Surgical Neuroradiology Fellow  [facility]  [phone]    Endovascular Surgical Neuroradiology staff is Dr. Bryan    I have reviewed the history. I have seen and examined the patient myself and agree with the assessment and plan above. Progressive improvement in left hemiparesis.  JEFFREY Bryan MD

## 2023-10-12 NOTE — PLAN OF CARE
Pt admitted for right MCA territory strokes with left sided weakness. Hx of a right ICA flow diverter stent placed for an incidental aneurysm. PIV-Sl'd. Neuros include d/o situation this shift need choices at times. Forgetful, weak L hand grasp. No droop noted this shift. Unable to lift LUE d/t  rotator cuff injury. LUE 3/5 AOE 5/5. Regular diet. Voiding. BS+ LBM 10/11. Up ad ana rosa with family and SBA when family is not here. Brace for LUE to be worn at night. Plan for home tomorrow with family.     Arrived from:    Belongings/meds: With pt   2 RN Skin Assessment Completed by: Minerva RN, Elli MILLER     Non-intact findings documented (yes/no/NA): R groin site

## 2023-10-12 NOTE — CONSULTS
Discharge Pharmacy Test Claim    Any brand glucometer supplies will be covered by patient's Medicare Part B plan and [facility] as long as patient meets Medicare Part B's diabetes criteria. Patient's [employer] Medicare Part D plan covers novolog flexpens with a copay of $35 per month.    Test Claim Copay   glucometer supplies 0.00   novolog flexpens 35.00     Bonita Thompson  [facility] Liaison  Ph: [phone]  Fax [phone]  Available on Any.DO (learn more here)

## 2023-10-12 NOTE — PLAN OF CARE
Goal Outcome Evaluation:      Plan of Care Reviewed With: patient, child    Overall Patient Progress: improvingOverall Patient Progress: improving

## 2023-10-12 NOTE — PROGRESS NOTES
Care Management Initial Consult    General Information  Assessment completed with: Patient, Children, Irene Spangler  Type of CM/SW Visit: Initial Assessment    Primary Care Provider verified and updated as needed: Yes   Readmission within the last 30 days:     Reason for Consult: discharge planning  Advance Care Planning:          Communication Assessment  Patient's communication style: spoken language (English or Bilingual)    Hearing Difficulty or Deaf: no   Wear Glasses or Blind: yes    Cognitive  Cognitive/Neuro/Behavioral: WDL  Level of Consciousness: alert  Arousal Level: opens eyes spontaneously  Orientation: disoriented to, situation (with choices)  Mood/Behavior: cooperative, calm  Best Language: 0 - No aphasia  Speech: clear, spontaneous, logical    Living Environment:   People in home: spouse     Current living Arrangements: house      Able to return to prior arrangements: no     Family/Social Support:  Care provided by: self, spouse/significant other  Provides care for: no one  Marital Status:   , Children  Jese       Description of Support System: Involved, Supportive    Support Assessment: Adequate family and caregiver support    Current Resources:   Patient receiving home care services: No     Community Resources: None  Equipment currently used at home: grab bar, toilet, grab bar, tub/shower, shower chair  Supplies currently used at home: None    Employment/Financial:  Employment Status:       Financial Concerns:        Does the patient's insurance plan have a 3 day qualifying hospital stay waiver?  No    Lifestyle & Psychosocial Needs:  Social Determinants of Health     Food Insecurity: Not on file   Depression: Not at risk (7/28/2023)    PHQ-2     PHQ-2 Score: 0   Housing Stability: Not on file   Tobacco Use: Low Risk  (9/3/2023)    Patient History     Smoking Tobacco Use: Never     Smokeless Tobacco Use: Never     Passive Exposure: Not on file   Financial Resource Strain: Not on  file   Alcohol Use: Not on file   Transportation Needs: Not on file   Physical Activity: Not on file   Interpersonal Safety: Not on file   Stress: Not on file   Social Connections: Not on file     Functional Status:  Prior to admission patient needed assistance:   Dependent ADLs:: Independent  Dependent IADLs:: Medication Management, Transportation     Mental Health Status:  Mental Health Status: No Current Concerns       Chemical Dependency Status:  Chemical Dependency Status: No Current Concerns           Values/Beliefs:  Spiritual, Cultural Beliefs, Yazidism Practices, Values that affect care: no             Additional Information:  Patient transferred to  this afternoon from ICU. Met with patient to complete initial care management assessment as assessment was not completed previously as indicated. Patient's daughter Irene was present during assessment and spoke mostly on patient's behalf. Prior to admission, patient was living in a house in [place] with her . She was independent with most ADLs/IADLs. Her daughter managed her medications and provided transportation. She denies current home or community services. States she was attending outpatient therapy at [facility] in [place]. She has her , two daughter and sister-in-law who is a retired nurse for good support.    Discussed discharge plan. PT/OT recommend home with 24 hr assist and outpatient PT/OT. Daughter states they will be staying at the patient's sister-in-laws guest house for the next three weeks prior to the patient moving into an Assisted Living Facility. Daughter Irene will be taking a leave from work and staying with her 24/7 until she moves into the [facility]. Irene will be able to assist with all needs, including managing her medications and new insulin/BG checks. Discussed outpatient therapy. Patient/daughter state they would like to stay within [place] for therapy and agree with schedulers contacting them at  discharge to schedule outpatient appointments. Daughter will be ride home at discharge.    Lexi Chauhan, RN, BSN  6A RN Care Coordinator  Ph: [phone]   Pager: [phone]

## 2023-10-12 NOTE — PROGRESS NOTES
Transferred to: 6A at 1316  Status at time of transfer: A/O x 4, denies pain. VSS on RA.   Belongings: Sent with patient and daughter to new room.  Kim removed? (if no, why?): NA  Chart and medications: Sent with patient.   Family notified: daughter at bedside, aware of patient transfer.

## 2023-10-12 NOTE — PLAN OF CARE
ICU End of Shift Summary. See flowsheets for vital signs and detailed assessment.    Changes this shift: A&Ox4 to orientation questions. Forgetful at times. Upper L side weakness. Wears L arm brace at nights. On RA. Afebrile. Blood pressures within goal. No BM overnight. Continent of urine. Pending AM labs.    Plan: Transfer to floor when bed available.

## 2023-10-13 ENCOUNTER — APPOINTMENT (OUTPATIENT)
Dept: EDUCATION SERVICES | Facility: CLINIC | Age: 73
DRG: 038 | End: 2023-10-13
Attending: RADIOLOGY
Payer: MEDICARE

## 2023-10-13 VITALS
WEIGHT: 122.3 LBS | BODY MASS INDEX: 23.09 KG/M2 | RESPIRATION RATE: 16 BRPM | TEMPERATURE: 99.1 F | HEART RATE: 101 BPM | OXYGEN SATURATION: 97 % | DIASTOLIC BLOOD PRESSURE: 70 MMHG | HEIGHT: 61 IN | SYSTOLIC BLOOD PRESSURE: 120 MMHG

## 2023-10-13 PROBLEM — E09.65 DRUG OR CHEMICAL INDUCED DIABETES MELLITUS WITH HYPERGLYCEMIA (H): Status: ACTIVE | Noted: 2023-10-13

## 2023-10-13 LAB
ANION GAP SERPL CALCULATED.3IONS-SCNC: 6 MMOL/L (ref 7–15)
BUN SERPL-MCNC: 26.2 MG/DL (ref 8–23)
CALCIUM SERPL-MCNC: 8.7 MG/DL (ref 8.8–10.2)
CHLORIDE SERPL-SCNC: 103 MMOL/L (ref 98–107)
CREAT SERPL-MCNC: 0.51 MG/DL (ref 0.51–0.95)
DEPRECATED HCO3 PLAS-SCNC: 28 MMOL/L (ref 22–29)
EGFRCR SERPLBLD CKD-EPI 2021: >90 ML/MIN/1.73M2
ERYTHROCYTE [DISTWIDTH] IN BLOOD BY AUTOMATED COUNT: 13.2 % (ref 10–15)
GLUCOSE BLDC GLUCOMTR-MCNC: 166 MG/DL (ref 70–99)
GLUCOSE BLDC GLUCOMTR-MCNC: 221 MG/DL (ref 70–99)
GLUCOSE SERPL-MCNC: 194 MG/DL (ref 70–99)
HCT VFR BLD AUTO: 33.2 % (ref 35–47)
HGB BLD-MCNC: 10.7 G/DL (ref 11.7–15.7)
MCH RBC QN AUTO: 27.6 PG (ref 26.5–33)
MCHC RBC AUTO-ENTMCNC: 32.2 G/DL (ref 31.5–36.5)
MCV RBC AUTO: 86 FL (ref 78–100)
PLATELET # BLD AUTO: 255 10E3/UL (ref 150–450)
POTASSIUM SERPL-SCNC: 4.4 MMOL/L (ref 3.4–5.3)
RBC # BLD AUTO: 3.88 10E6/UL (ref 3.8–5.2)
SODIUM SERPL-SCNC: 137 MMOL/L (ref 135–145)
WBC # BLD AUTO: 8.2 10E3/UL (ref 4–11)

## 2023-10-13 PROCEDURE — 36415 COLL VENOUS BLD VENIPUNCTURE: CPT

## 2023-10-13 PROCEDURE — 80048 BASIC METABOLIC PNL TOTAL CA: CPT

## 2023-10-13 PROCEDURE — 85027 COMPLETE CBC AUTOMATED: CPT

## 2023-10-13 PROCEDURE — 250N000013 HC RX MED GY IP 250 OP 250 PS 637: Performed by: STUDENT IN AN ORGANIZED HEALTH CARE EDUCATION/TRAINING PROGRAM

## 2023-10-13 PROCEDURE — 250N000012 HC RX MED GY IP 250 OP 636 PS 637: Performed by: STUDENT IN AN ORGANIZED HEALTH CARE EDUCATION/TRAINING PROGRAM

## 2023-10-13 PROCEDURE — 250N000013 HC RX MED GY IP 250 OP 250 PS 637

## 2023-10-13 RX ORDER — BLOOD-GLUCOSE METER
KIT MISCELLANEOUS
Qty: 1 KIT | Refills: 0 | Status: SHIPPED | OUTPATIENT
Start: 2023-10-13 | End: 2023-12-18

## 2023-10-13 RX ORDER — DEXAMETHASONE 0.5 MG/1
TABLET ORAL
Qty: 9 TABLET | Refills: 0 | Status: SHIPPED | OUTPATIENT
Start: 2023-10-13 | End: 2023-12-18

## 2023-10-13 RX ORDER — DEXAMETHASONE 0.5 MG/1
TABLET ORAL
Qty: 9 TABLET | Refills: 0 | Status: SHIPPED | OUTPATIENT
Start: 2023-10-13 | End: 2023-10-13

## 2023-10-13 RX ORDER — BLOOD-GLUCOSE METER
KIT MISCELLANEOUS
Qty: 1 KIT | Refills: 0 | Status: SHIPPED | OUTPATIENT
Start: 2023-10-13 | End: 2023-10-13

## 2023-10-13 RX ORDER — PEN NEEDLE, DIABETIC 30 GX3/16"
1 NEEDLE, DISPOSABLE MISCELLANEOUS 3 TIMES DAILY
Qty: 90 EACH | Refills: 0 | Status: SHIPPED | OUTPATIENT
Start: 2023-10-13 | End: 2023-12-18

## 2023-10-13 RX ADMIN — INSULIN ASPART 3 UNITS: 100 INJECTION, SOLUTION INTRAVENOUS; SUBCUTANEOUS at 12:19

## 2023-10-13 RX ADMIN — ASPIRIN 81 MG CHEWABLE TABLET 81 MG: 81 TABLET CHEWABLE at 08:24

## 2023-10-13 RX ADMIN — CLOPIDOGREL BISULFATE 75 MG: 75 TABLET ORAL at 08:24

## 2023-10-13 RX ADMIN — DEXAMETHASONE 1 MG: 0.5 TABLET ORAL at 08:24

## 2023-10-13 RX ADMIN — ACETAMINOPHEN 975 MG: 325 TABLET, FILM COATED ORAL at 04:28

## 2023-10-13 RX ADMIN — INSULIN ASPART 2 UNITS: 100 INJECTION, SOLUTION INTRAVENOUS; SUBCUTANEOUS at 08:24

## 2023-10-13 ASSESSMENT — ACTIVITIES OF DAILY LIVING (ADL)
ADLS_ACUITY_SCORE: 27

## 2023-10-13 NOTE — PLAN OF CARE
Goal Outcome Evaluation:      Plan of Care Reviewed With: patient, child    Overall Patient Progress: improvingOverall Patient Progress: improving    Outcome Evaluation: Discharging home with daughter    Discharge time/date: 10/13/23 1455  Walked or Wheelchair: Wheelchair with daughter   PIV removed: Yes   Reviewed AVS with patient: Yes and daughter   Medication due times added to AVS in EPIC: Yes   Verbalized understanding of discharge with teachback: Yes   Medications retrieved from pharmacy: Yes     Pt received Stroke education & Diabetes Teaching with daughter present before discharge.   Pt and daughter verbalized understanding of proper administration of medications.   Encouraged to attend all follow up appointments.

## 2023-10-13 NOTE — CONSULTS
Stroke Education Note    The following information has been reviewed with the patient and family:  Did stroke education over video call with patient and her daughter, Elisa.     1. Warning signs of stroke    2. Calling 911 if having warning signs of stroke    3. All modifiable risk factors: hypertension, CAD, atrial fib, diabetes, hypercholesterolemia, smoking, substance abuse, diet, physical inactivity, obesity, sleep apnea.    4. Patient's risk factors for stroke which include: pre-diabetes    5. Follow-up plan for after discharge    6. Discharge medications which include: ASA, Plavix, Lipitor    In addition, the above information was given to the patient and family in writing as a part of the [facility] Stroke Class Handout.    Learner's response to risk factors / lifestyle modification education: Activation Taking steps     Janina Clifford RN

## 2023-10-13 NOTE — CONSULTS
"Diabetes Educator Consult    Crys Gama is a 73 year old female with PMH of Alzheimer disease, anxiety, R ICA aneurysm s/p stent c/b multiple recurrent strokes admitted on 10/6/2023 with left sided facial droop and arm weakness s/p cerebral angiogram and angioplasty.     Diabetes Educator consulted for \"Steroid induced diabetes - insulin education while tapering down.\" Crys has no known history of diabetes. She was not on any diabetes medication PTA. A1c was 5.7% on 10/6/2023. Currently on dexamethasone taper requiring correction insulin for steroid-induced hyperglycemia. Tolerating a regular diet with Ensure Enlive between meals.     Crys and Irene (daughter) present at bedside for education. Crys stated \"I'm glad Irene is doing this because I can't remember all of this.\" Irene agreed that she would be providing all BG checks and insulin administration.     Barriers to diabetes management: Crys has left upper extremity weakness and baseline forgetfulness. She acknowledges both and agrees that Irene will be responsible for checking BGs and administering insulin.     Diabetes Education Provided:  Discussed reason for requiring blood glucose monitoring and insulin with steroid-induced hyperglycemia.  Reviewed insulin aspart action, dose, onset, peak, and duration. Discussed proper insulin pen storage, preparation, and injection technique. Discussed pen needle disposal in sharps container. Discussed site selection and rotation. Irene successfully demonstrated administration with demo pen and injection pad. Provided \"4 mm pen needle - tips for good injection practice\" handout. Discussed frequency and dosing of insulin administration.  Opened and set up WebLayers Next Glucose Meter. Reviewed lancing device set up, safe use, and lancet disposal in sharps. Reviewed meter set up, memory storage, and testing. Reviewed fingertip site selection and hygiene. Irene successfully loaded and removed lancet " "from lancing device but declined demonstrating blood glucose check with meter. Reviewed when and how to use control solution. Frequency of testing discussed.   Discussed signs and symptoms of hypoglycemia. Reviewed treating blood glucose below 70 mg/dL with 15 grams of carbohydrates. Discussed examples of 15 grams of CHO. Reviewed treating blood glucose less than or equal to 50 mg/dL with 30 grams of CHO. Discussed rechecking BG 15 minutes after treatment and retreating if necessary. Provided \"Understanding Diabetes\" handout.   Reviewed calling provider for consistently elevated BGs and/or symptoms of hyperglycemia.    Diabetes Plan Given to Patient:  Blood Glucose Monitoring -  Contour Next glucose meter  Check your blood glucose 4 times a day (before each meal and at bedtime)    Insulin aspart (Novolog) - rapid acting insulin   Correction Dose - Take before each meal and at bedtime  Use blood glucose value and correction scale to determine Novolog dose    Pre-Meal Correction Scale - HIGH INSULIN RESISTANCE DOSING     BG = blood glucose    Do Not give Correction Insulin if Pre-Meal BG less than 140.   For Pre-Meal  - 164 give 1 unit.   For Pre-Meal  - 189 give 2 units.   For Pre-Meal  - 214 give 3 units.   For Pre-Meal  - 239 give 4 units.   For Pre-Meal  - 264 give 5 units.   For Pre-Meal  - 289 give 6 units.   For Pre-Meal  - 314 give 7 units.   For Pre-Meal  - 339 give 8 units.   For Pre-Meal  - 364 give 9 units.  For Pre-Meal BG greater than or equal to 365 give 10 units.    Bedtime Correction Scale - HIGH INSULIN RESISTANCE DOSING    BG = blood glucose    Do Not give Bedtime Correction Insulin if BG less than 200.   For  - 224 give 1 units.   For  - 249 give 2 units.   For  - 274 give 3 units.   For  - 299 give 4 units.   For  - 324 give 5 units.   For  - 349 give 6 units.   For BG greater than or equal to 350 give 7 " units.      Supplies Needed at Discharge:  Marlee Contour Next glucose meter - starter kit provided with ed  Marlee Contour Next test strips  Marlee Microlet Lancets  Sharps Container  Alcohol Wipes  B-D 4 mm janna pen needles  Novolog flexpens      Diabetes education completed. Nadine (daughter) stated that plan is manageable. All questions answered. Discussed with PAUL Calloway.       Gato Waters DNP, [facility]  Diabetes Educator  Pager: [phone]  Office: [phone]

## 2023-10-13 NOTE — PLAN OF CARE
Status:  Pt admitted on 10/6/2023 with left sided facial droop and arm weakness, history of a right ICA flow diverter stent placed for an aneurysm, and Alzheimer's disease    Vitals: VSS on RA.   Neuros: A&Ox4. Forgetful baseline. Left side facial droop with smile. Left arm weakness, arm brace overnight. N/T to LUE, 5/5 t/o ex LUE 3/5    IV: PIV SL  Labs/Electrolytes: WNL, ACHS BG checks  Resp/trach: LSC, denies SOB  Diet: Regular  Bowel status: LBM 10/11/23  : Voiding spont to BR  Skin: Brace for LUE at night   Pain: neck pain managed with tylenol  Activity: SBA/GB  Plan: Continue to monitor and follow POC    Goal Outcome Evaluation:      Plan of Care Reviewed With: patient    Overall Patient Progress: improvingOverall Patient Progress: improving    Outcome Evaluation: VSS on RA. Neck pain managed withTylenol.

## 2023-10-13 NOTE — PROGRESS NOTES
Care Management Discharge Note    Discharge Date: 10/13/2023     Discharge Disposition: Home  Discharge Services: Outpatient PT/OT  Discharge DME: None    Discharge Transportation: family or friend will provide    Private pay costs discussed: Not applicable    Does the patient's insurance plan have a 3 day qualifying hospital stay waiver?  No    Education Provided on the Discharge Plan: Yes  Persons Notified of Discharge Plans: Patient, provider, family, nurse  Patient/Family in Agreement with the Plan: Yes    Handoff Referral Completed: Yes    Additional Information:  Patient status reviewed, discussed in discharge rounds. Patient medically ready for discharge today. Therapy recommends home with 24/7 assist and OP PT/OT.    Spoke with daughter Irene who states she spoke to [facility] (the [facility] the patient will be moving into in a few weeks) and is able to get her started with therapies there. Irene would like PT/OT referrals sent to [facility] and printed out for her when they are available.     [id number] Addendum:  Therapy referral orders faxed to [facility] and printed/given to daughter.    IMM given, handoff complete.    [facility] - outpatient PT/OT  [address] [place] [address]  Ph: [phone]  Fax: [phone]    Lexi Chauhan RN, BSN  6A RN Care Coordinator  Ph: [phone]   Pager: [phone]

## 2023-10-13 NOTE — PLAN OF CARE
Occupational Therapy Discharge Summary    Reason for therapy discharge:    Discharged to home with outpatient therapy.    Progress towards therapy goal(s). See goals on Care Plan in [facility] electronic health record for goal details.  Goals partially met.  Barriers to achieving goals:   discharge from facility.    Therapy recommendation(s):    Continued therapy is recommended.  Rationale/Recommendations:  decreased ADL I.

## 2023-10-14 NOTE — PLAN OF CARE
Physical Therapy Discharge Summary    Reason for therapy discharge:    Discharged to home with outpatient therapy.    Progress towards therapy goal(s). See goals on Care Plan in [facility] electronic health record for goal details.  Goals partially met.  Barriers to achieving goals:   discharge from facility.    Therapy recommendation(s):    Continued therapy is recommended.  Rationale/Recommendations:  OP PT to address deficits in functional mobility and strength.

## 2023-10-15 ENCOUNTER — PATIENT OUTREACH (OUTPATIENT)
Dept: CARE COORDINATION | Facility: CLINIC | Age: 73
End: 2023-10-15
Payer: MEDICARE

## 2023-10-15 NOTE — PROGRESS NOTES
Clinic Care Coordination Contact  [facility]: Post-Discharge Note  SITUATION                                                      Admission:    Admission Date: 10/06/23   Reason for Admission: Progressive left arm weakness and development of left facial droop  Discharge:   Discharge Date: 10/13/23  Discharge Diagnosis: Acute ischemic stroke of Right middle cerebral artery    BACKGROUND                                                      Per hospital discharge summary and inpatient provider notes:    Crys Gama is a 73 year old female with history of Alzheimer's disease, R paraophthalmic / R ICA aneurysm s/p flow diverter stent c/b multiple recurrent R MCA territory strokes, and anxiety who presents for elective cerebral angiogram and angioplasty in setting of progressive weakness of her L arm associated with development of L facial droop.     ASSESSMENT           Discharge Assessment  How are you doing now that you are home?: She's doing pretty good. She blew her nose and has had a slight nose bleed (bleeding just noted on tissue) and is resting now. She's had these before since she is taking aspirin and plavix.  How are your symptoms? (Red Flag symptoms escalate to triage hotline per guidelines): Improved  Do you feel your condition is stable enough to be safe at home until your provider visit?: Yes  Does the patient have their discharge instructions? : Yes  Does the patient have questions regarding their discharge instructions? : No  Were you started on any new medications or were there changes to any of your previous medications? : Yes  Does the patient have all of their medications?: Yes  Do you have questions regarding any of your medications? : No  Do you have all of your needed medical supplies or equipment (DME)?  (i.e. oxygen tank, CPAP, cane, etc.): Yes  Discharge follow-up appointment scheduled within 14 calendar days? : No (11-14 neurosurgery)         Post-op (Clinicians Only)  Did the  patient have surgery or a procedure: Yes (carotid cerebral angiogram and angioplasty)  Incision: closed  Eating & Drinking: eating and drinking without complaints/concerns  PO Intake: regular diet  Bowel Function:  (unknown)  Urinary Status: voiding without complaint/concerns    Patient improving per daughter. Resting now. Facial droop and left hand and arm weakness improving. Still with left leg weakness but daughter states not worse than prior to hospital stay. Patient able to ambulate independently. Will start OT and PT therapy on Tuesday at [facility] that patient will be moving to in 2 weeks. Daughter doesn't think she needs speech therapy. Groin site healing normally with no evidence of bruising, redness, warmth or lump. Daughter checking blood sugars 4 times daily as recommended. Patient only needed 1-2 units of insulin yesterday and none today. Fasting blood sugar this morning 98 and this was just rechecked with reading of 119. Daughter with no questions or concerns. 24/7 [facility] nurse triage phone number provided to patient's daughter.       PLAN                                                      Outpatient Plan:  Follow up with Dr. Bryan , [facility], within 1 month to evaluate medication change and to evaluate treatment  change. The following labs/tests are recommended: Repeat MRA/ MRI  head in 3 months .    Future Appointments   Date Time Provider Department Center   11/14/2023  9:00 AM Xochitl Bryan MD [facility]         For any urgent concerns, please contact our 24 hour nurse triage line: [phone] ([phone])         Bina Rogers RN

## 2023-10-23 ENCOUNTER — TELEPHONE (OUTPATIENT)
Dept: DERMATOLOGY | Facility: CLINIC | Age: 73
End: 2023-10-23
Payer: MEDICARE

## 2023-10-23 NOTE — TELEPHONE ENCOUNTER
[facility] Call Center    Phone Message    May a detailed message be left on voicemail: yes     Reason for Call: Appointment Intake    Referring Provider Name: Dr. Rossi  Diagnosis and/or Symptoms: Pt's daughter Elisa states Pt needs a Bx that had been cancelled twice rescheduled.     Writer does not see canceled biopsy visits in [facility], no orders for a biopsy in [facility].     Please call Pt's daughter Elisa back to reschedule at [phone].   Thank you.     Action Taken: Message routed to:  Adult Clinics: Dermatology p [id number]    Travel Screening: Not Applicable

## 2023-11-14 ENCOUNTER — VIRTUAL VISIT (OUTPATIENT)
Dept: NEUROSURGERY | Facility: CLINIC | Age: 73
End: 2023-11-14
Payer: MEDICARE

## 2023-11-14 VITALS — WEIGHT: 125 LBS | BODY MASS INDEX: 23.62 KG/M2

## 2023-11-14 DIAGNOSIS — I67.1 CEREBRAL ANEURYSM, NONRUPTURED: Primary | ICD-10-CM

## 2023-11-14 DIAGNOSIS — I63.411 EMBOLIC STROKE INVOLVING RIGHT MIDDLE CEREBRAL ARTERY (H): ICD-10-CM

## 2023-11-14 DIAGNOSIS — H93.13 TINNITUS OF BOTH EARS: ICD-10-CM

## 2023-11-14 PROCEDURE — 99442 PR PHYSICIAN TELEPHONE EVALUATION 11-20 MIN: CPT | Mod: 95 | Performed by: NEUROLOGICAL SURGERY

## 2023-11-14 RX ORDER — CLOPIDOGREL BISULFATE 75 MG/1
75 TABLET ORAL DAILY
Qty: 90 TABLET | Refills: 1 | Status: SHIPPED | OUTPATIENT
Start: 2023-11-14 | End: 2024-05-01

## 2023-11-14 ASSESSMENT — PAIN SCALES - GENERAL: PAINLEVEL: NO PAIN (0)

## 2023-11-14 NOTE — PATIENT INSTRUCTIONS
Stay on aspirin and Plavix for at least 6 months. We will renew the plavix for the 6 months. Audiogram at [place] (ordered). MRI brain with contrast in [place] later this month (ordered). We will discuss your lip biopsy with the dermatology team. Follow up after MRI.    Stroke & Endovascular RN Care Coordinators:    Cecilia Gonzalez, RN, BSN  Tena Sexton, RN, CNRN, SCRN    If you have any questions please contact the RN Care Coordinators at [phone], option 1.     After business hours call the  at [phone] and have the Neuro-Interventional Fellow paged.    Thank you for choosing [facility] for your health care needs.

## 2023-11-14 NOTE — PROGRESS NOTES
Virtual Visit Details    Type of service:  Telephone Visit   Phone call duration: 20 minutes   Service date: 11/14/2023    Roxanna Hernandez MD  [address]  Internal Medicine  [place] [address]      Dear Dr. Hernandez:    We spoke to Mrs. Gama as part of a telephone follow-up in cerebrovascular clinic today.  She was accompanied by her daughter and .  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  She was doing well for about a month and then developed multifocal embolic strokes in the right hemisphere.  These emboli were unusual and that there is a fair amount of edema surrounding each infarct.  We repeated an angiogram and found mild intimal hyperplasia within the flow diverter construct.  This was treated with balloon angioplasty but the multiple emboli were still puzzling.  We kept her on high-dose steroids briefly for potential sloughing of metal off the flow diverters.  We weaned off the steroids.    She is currently doing relatively well.  Much of the left-sided weakness has improved.  The left foot remains weak and she has some foot drop.  She is doing physical, occupational, and speech therapy.  The left upper extremity has improved considerably but she still has some numbness and weakness in the hand.  She has uncommon, very mild left-sided headaches above the ear.  Her baseline bilateral tinnitus seems worse, more on the left side.  She has not had an audiogram for at least 2 years.  Her family believes that her hearing has worsened.    Over the phone, her speech, language, and phonation were normal.  She was very articulate.    There were no new images to review.  Of note, the aneurysm is no longer filling.    The delayed embolic strokes following flow diversion of an intracranial aneurysm are unusual.  We we will keep her on dual antiplatelet therapy with aspirin and Plavix for 6 to 12 months.  We will repeat an MRI  of the brain later this month to check for any new occult strokes.  She is scheduled for a lip biopsy next month for potential basal cell carcinoma.  We will coordinate plans with the dermatology team.  We will also schedule her for an audiogram.  We will follow-up with her after the MRI.  Finally, she asked that we renew her prescriptions through the [facility] pharmacy and asked for 90-day supplies.  We will keep you informed of her progress.  Please not hesitate contact us with questions.    Sincerely,        Xochitl Bryan MD  Department of Neurosurgery

## 2023-11-14 NOTE — NURSING NOTE
Is the patient currently in the state of [place]? YES    Visit mode:TELEPHONE    If the visit is dropped, the patient can be reconnected by: TELEPHONE VISIT: Phone number: [phone]    Will anyone else be joining the visit? NO  (If patient encounters technical issues they should call [phone]    How would you like to obtain your AVS? MyChart    Are changes needed to the allergy or medication list? Yes miratazapine 15mg,     Reason for visit: Follow Up    Rosa Isela HUGHES

## 2023-11-14 NOTE — LETTER
11/14/2023       RE: Crys Gama  [address]  [place] [facility] [place] [facility] [address]       Dear Colleague,    Thank you for referring your patient, Crys Gama, to the [facility] [place] [facility]. Please see a copy of my visit note below.      Roxanna Hernandez MD  [address]  Internal Medicine  [facility] [address]      Dear Dr. Hernandez:    We spoke to Mrs. Gama as part of a telephone follow-up in cerebrovascular clinic today.  She was accompanied by her daughter and .  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  She was doing well for about a month and then developed multifocal embolic strokes in the right hemisphere.  These emboli were unusual and that there is a fair amount of edema surrounding each infarct.  We repeated an angiogram and found mild intimal hyperplasia within the flow diverter construct.  This was treated with balloon angioplasty but the multiple emboli were still puzzling.  We kept her on high-dose steroids briefly for potential sloughing of metal off the flow diverters.  We weaned off the steroids.    She is currently doing relatively well.  Much of the left-sided weakness has improved.  The left foot remains weak and she has some foot drop.  She is doing physical, occupational, and speech therapy.  The left upper extremity has improved considerably but she still has some numbness and weakness in the hand.  She has uncommon, very mild left-sided headaches above the ear.  Her baseline bilateral tinnitus seems worse, more on the left side.  She has not had an audiogram for at least 2 years.  Her family believes that her hearing has worsened.    Over the phone, her speech, language, and phonation were normal.  She was very articulate.    There were no new images to review.  Of note, the aneurysm is no longer filling.    The delayed  embolic strokes following flow diversion of an intracranial aneurysm are unusual.  We we will keep her on dual antiplatelet therapy with aspirin and Plavix for 6 to 12 months.  We will repeat an MRI of the brain later this month to check for any new occult strokes.  She is scheduled for a lip biopsy next month for potential basal cell carcinoma.  We will coordinate plans with the dermatology team.  We will also schedule her for an audiogram.  We will follow-up with her after the MRI.  Finally, she asked that we renew her prescriptions through the [facility] pharmacy and asked for 90-day supplies.  We will keep you informed of her progress.  Please not hesitate contact us with questions.          Again, thank you for allowing me to participate in the care of your patient.      Sincerely,    Xochitl Bryan MD

## 2023-12-06 ENCOUNTER — OFFICE VISIT (OUTPATIENT)
Dept: DERMATOLOGY | Facility: CLINIC | Age: 73
End: 2023-12-06
Payer: MEDICARE

## 2023-12-06 DIAGNOSIS — L57.0 ACTINIC KERATOSIS: Primary | ICD-10-CM

## 2023-12-06 PROBLEM — D48.5 NEOPLASM OF UNCERTAIN BEHAVIOR OF SKIN: Status: ACTIVE | Noted: 2023-12-06

## 2023-12-06 PROCEDURE — 17000 DESTRUCT PREMALG LESION: CPT | Performed by: PHYSICIAN ASSISTANT

## 2023-12-06 NOTE — PROGRESS NOTES
[facility] Dermatology Note  Encounter Date: Dec 6, 2023  Office Visit      Dermatology Problem List:    1. SCC - L dorsal hand s/p Mohs 5/11/22  2. Vitiligo - began at age 25  3. AKs, s/p cryo  4. Patch, left wrist, monitored  5. Dermatitis, most likely eczema   - s/p triamcinolone 2020  6. COVID vaccine reaction, left bicep  ____________________________________________    Assessment & Plan:  # AK x1, right upper cutaneous lip. No papule on exam  - Cryotherapy performed, see procedure note.     Procedures Performed:   Cryotherapy procedure note: After verbal consent and discussion of risks and benefits including but no limited to dyspigmentation/scar, blister, and pain, 1 was(were) treated with 1-2mm freeze border for 2 cycles with liquid nitrogen. Post cryotherapy instructions were provided.     Staff and scribe    Scribe Disclosure:   I, HOLDEN GRAFF, am serving as a scribe; to document services personally performed by Huma Garcia PA-C -based on data collection and the provider's statements to me.     Provider Disclosure:  I agree with above History, Review of Systems, Physical exam and Plan.  I have reviewed the content of the documentation and have edited it as needed. I have personally performed the services documented here and the documentation accurately represents those services and the decisions I have made.      Electronically signed by:    All risks, benefits and alternatives were discussed with patient.  Patient is in agreement and understands the assessment and plan.  All questions were answered.    Huma Garcia PA-C, [facility]  [facility] [place]: Phone: [phone], Fax: [phone]  [facility]: Phone: [phone],  Fax: [phone]  [facility]: Phone: [phone], Fax: [phone]  ____________________________________________    CC: RECHECK (Pink papule upper  cutaneous lip, possibly BCC per Dr. Rossi's note on 07/28/23 )      Reviewed patients past medical history and pertinent chart review prior to patient's visit today.     HPI:  Ms. Crys Gama is a 73 year old female who presents today as a return patient for recheck spot check.     Today patient reports that she has a spot of concern on her upper cutaneous lip.     Per Dr. Rossi's note may be BCC.    Patient is otherwise feeling well, without additional concerns.    Labs:  N/A.    Physical Exam:  Vitals: There were no vitals taken for this visit.  SKIN: Focused examination of Lip was performed.   - On her upper cutaneous lip there is red papule consistent with cherry angioma.   - Pink macule with mild scale on the right cutaneous lip, consistent with an AK x1  - No other lesions of concern on areas examined.     Medications:  Current Outpatient Medications   Medication    aspirin (ASA) 325 MG tablet    atorvastatin (LIPITOR) 40 MG tablet    blood glucose (NO BRAND SPECIFIED) lancets standard    blood glucose (NO BRAND SPECIFIED) test strip    Blood Glucose Monitoring Suppl (CONTOUR BLOOD GLUCOSE SYSTEM) w/Device KIT    cetirizine (ZYRTEC) 10 MG tablet    clopidogrel (PLAVIX) 75 MG tablet    COLLAGEN PO    cyanocobalamin (VITAMIN B-12) 1000 MCG tablet    dexAMETHasone (DECADRON) 0.5 MG tablet    donepezil (ARICEPT) 10 MG tablet    insulin aspart (NOVOLOG PEN) 100 UNIT/ML pen    insulin pen needle (32G X 4 MM) 32G X 4 MM miscellaneous    Insulin Pen Needle (PEN NEEDLES) 32G X 4 MM MISC    mirtazapine (REMERON) 7.5 MG tablet    Multiple Vitamin (MULTI-DAY PO)    Vitamin D3 (CHOLECALCIFEROL) 25 mcg (1000 units) tablet     No current facility-administered medications for this visit.      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

## 2023-12-06 NOTE — NURSING NOTE
Crys Gama's goals for this visit include:   Chief Complaint   Patient presents with    RECHECK     Pink papule upper cutaneous lip, possibly BCC per Dr. Rossi's note on 07/28/23      She requests these members of her care team be copied on today's visit information:     PCP: Roxanna Hernandez    Referring Provider:  No referring provider defined for this encounter.    There were no vitals taken for this visit.    Do you need any medication refills at today's visit? Deisy Galvan CMA

## 2023-12-06 NOTE — PATIENT INSTRUCTIONS
Cryotherapy    What is it?  Use of a very cold liquid, such as liquid nitrogen, to freeze and destroy abnormal skin cells that need to be removed    What should I expect?  Tenderness and redness  A small blister that might grow and fill with dark purple blood. There may be crusting.  More than one treatment may be needed if the lesions do not go away.    How do I care for the treated area?  Gently wash the area with your hands when bathing.  Use a thin layer of Vaseline to help with healing. You may use a Band-Aid.   The area should heal within 7-10 days and may leave behind a pink or lighter color.   Do not use an antibiotic or Neosporin ointment.   You may take acetaminophen (Tylenol) for pain.     Call your Doctor if you have:  Severe pain  Signs of infection (warmth, redness, cloudy yellow drainage, and or a bad smell)  Questions or concerns    Who should I call with questions?      [facility]: [phone]      [facility]: [phone]      For urgent needs outside of business hours call the [facility] at [phone]        and ask for the dermatology resident on call    
09-Feb-2022

## 2023-12-06 NOTE — LETTER
12/6/2023         RE: Crys Gama  [address]  [address]  [facility] [address]        Dear Colleague,    Thank you for referring your patient, Crys Gama, to the [facility]. Please see a copy of my visit note below.    [facility] Dermatology Note  Encounter Date: Dec 6, 2023  Office Visit      Dermatology Problem List:    1. SCC - L dorsal hand s/p Mohs 5/11/22  2. Vitiligo - began at age 25  3. AKs, s/p cryo  4. Patch, left wrist, monitored  5. Dermatitis, most likely eczema   - s/p triamcinolone 2020  6. COVID vaccine reaction, left bicep  ____________________________________________    Assessment & Plan:  # AK x1, right upper cutaneous lip. No papule on exam  - Cryotherapy performed, see procedure note.     Procedures Performed:   Cryotherapy procedure note: After verbal consent and discussion of risks and benefits including but no limited to dyspigmentation/scar, blister, and pain, 1 was(were) treated with 1-2mm freeze border for 2 cycles with liquid nitrogen. Post cryotherapy instructions were provided.     Staff and scribe    Scribe Disclosure:   I, HOLDEN GRAFF, am serving as a scribe; to document services personally performed by Huma Garcia PA-C -based on data collection and the provider's statements to me.     Provider Disclosure:  I agree with above History, Review of Systems, Physical exam and Plan.  I have reviewed the content of the documentation and have edited it as needed. I have personally performed the services documented here and the documentation accurately represents those services and the decisions I have made.      Electronically signed by:    All risks, benefits and alternatives were discussed with patient.  Patient is in agreement and understands the assessment and plan.  All questions were answered.    Huma Garcia PA-C, [facility]  [facility]: Phone:  [phone], Fax: [phone]  [facility]: Phone: [phone],  Fax: [phone]  [facility]: Phone: [phone], Fax: [phone]  ____________________________________________    CC: RECHECK (Pink papule upper cutaneous lip, possibly BCC per Dr. Rossi's note on 07/28/23 )      Reviewed patients past medical history and pertinent chart review prior to patient's visit today.     HPI:  Ms. Crys Gama is a 73 year old female who presents today as a return patient for recheck spot check.     Today patient reports that she has a spot of concern on her upper cutaneous lip.     Per Dr. Rossi's note may be BCC.    Patient is otherwise feeling well, without additional concerns.    Labs:  N/A.    Physical Exam:  Vitals: There were no vitals taken for this visit.  SKIN: Focused examination of Lip was performed.   - On her upper cutaneous lip there is red papule consistent with cherry angioma.   - Pink macule with mild scale on the right cutaneous lip, consistent with an AK x1  - No other lesions of concern on areas examined.     Medications:  Current Outpatient Medications   Medication     aspirin (ASA) 325 MG tablet     atorvastatin (LIPITOR) 40 MG tablet     blood glucose (NO BRAND SPECIFIED) lancets standard     blood glucose (NO BRAND SPECIFIED) test strip     Blood Glucose Monitoring Suppl (CONTOUR BLOOD GLUCOSE SYSTEM) w/Device KIT     cetirizine (ZYRTEC) 10 MG tablet     clopidogrel (PLAVIX) 75 MG tablet     COLLAGEN PO     cyanocobalamin (VITAMIN B-12) 1000 MCG tablet     dexAMETHasone (DECADRON) 0.5 MG tablet     donepezil (ARICEPT) 10 MG tablet     insulin aspart (NOVOLOG PEN) 100 UNIT/ML pen     insulin pen needle (32G X 4 MM) 32G X 4 MM miscellaneous     Insulin Pen Needle (PEN NEEDLES) 32G X 4 MM MISC     mirtazapine (REMERON) 7.5 MG tablet     Multiple Vitamin (MULTI-DAY PO)     Vitamin D3 (CHOLECALCIFEROL) 25 mcg (1000 units) tablet     No current  facility-administered medications for this visit.      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        Sincerely,        Huma Garcia PA-C

## 2023-12-13 ENCOUNTER — ANCILLARY PROCEDURE (OUTPATIENT)
Dept: MRI IMAGING | Facility: CLINIC | Age: 73
End: 2023-12-13
Attending: NEUROLOGICAL SURGERY
Payer: MEDICARE

## 2023-12-13 DIAGNOSIS — I63.411 EMBOLIC STROKE INVOLVING RIGHT MIDDLE CEREBRAL ARTERY (H): ICD-10-CM

## 2023-12-13 PROCEDURE — G1010 CDSM STANSON: HCPCS | Performed by: RADIOLOGY

## 2023-12-13 PROCEDURE — A9585 GADOBUTROL INJECTION: HCPCS | Mod: JZ | Performed by: RADIOLOGY

## 2023-12-13 PROCEDURE — 70553 MRI BRAIN STEM W/O & W/DYE: CPT | Mod: MG | Performed by: RADIOLOGY

## 2023-12-13 RX ORDER — GADOBUTROL 604.72 MG/ML
7.5 INJECTION INTRAVENOUS ONCE
Status: DISCONTINUED | OUTPATIENT
Start: 2023-12-13 | End: 2023-12-13

## 2023-12-13 RX ORDER — GADOBUTROL 604.72 MG/ML
7.5 INJECTION INTRAVENOUS ONCE
Status: COMPLETED | OUTPATIENT
Start: 2023-12-13 | End: 2023-12-13

## 2023-12-13 RX ADMIN — GADOBUTROL 5.5 ML: 604.72 INJECTION INTRAVENOUS at 13:09

## 2023-12-18 ENCOUNTER — OFFICE VISIT (OUTPATIENT)
Dept: URGENT CARE | Facility: URGENT CARE | Age: 73
End: 2023-12-18
Payer: MEDICARE

## 2023-12-18 VITALS
OXYGEN SATURATION: 99 % | DIASTOLIC BLOOD PRESSURE: 78 MMHG | RESPIRATION RATE: 18 BRPM | HEART RATE: 90 BPM | SYSTOLIC BLOOD PRESSURE: 116 MMHG | TEMPERATURE: 98.8 F | BODY MASS INDEX: 25.32 KG/M2 | WEIGHT: 134 LBS

## 2023-12-18 DIAGNOSIS — L50.9 HIVES: Primary | ICD-10-CM

## 2023-12-18 PROCEDURE — 99203 OFFICE O/P NEW LOW 30 MIN: CPT | Performed by: EMERGENCY MEDICINE

## 2023-12-18 RX ORDER — PREDNISONE 20 MG/1
40 TABLET ORAL DAILY
Qty: 10 TABLET | Refills: 0 | Status: SHIPPED | OUTPATIENT
Start: 2023-12-18

## 2023-12-18 RX ORDER — PREDNISONE 20 MG/1
40 TABLET ORAL DAILY
Qty: 10 TABLET | Refills: 0 | OUTPATIENT
Start: 2023-12-18 | End: 2023-12-18

## 2023-12-18 NOTE — PATIENT INSTRUCTIONS
Try Zyrtec twice daily for the next 2 days -- avoid Benadryl for the time being. If persistent symptoms or worsening you can start the steroid medication as prescribed and monitor symptoms -- take it in the morning. Go to the ER for tongue/facial swelling, difficulties breathing etc.

## 2023-12-18 NOTE — PROGRESS NOTES
Assessment & Plan     Diagnosis:    ICD-10-CM    1. Hives  L50.9 predniSONE (DELTASONE) 20 MG tablet     DISCONTINUED: predniSONE (DELTASONE) 20 MG tablet     DISCONTINUED: predniSONE (DELTASONE) 20 MG tablet          Medical Decision Making:  Crys Gama is a 73 year old female who presents with complaint of hives.  The patient has hives but no signs of airway compromise or anaphylaxis.  There are no new exposures to suggest a specific allergan. Has been going on for 1 week. At this point there are no signs of worsening and I believe the patient is safe for discharge home. Recommended antihistamines, journal of foods/medications, start prednisone for worsening symptoms.  Recommended follow-up with PMD in 3-5 days for persistent symptoms and gave precautions to return or go to the ER if worsening. Questions answered.       CHELI [facility]    Subjective     Crys Gama is a 73 year old female who presents to clinic today for the following health issues:  Chief Complaint   Patient presents with    Rash     X 1 WEEK,ITCHY,RED ON TORSO A D BACK       HPI  Patient reports 1 week of itchy rash that is migrating on her torso, arms and back.  Seems to be popping up in different areas intermittently and is very itchy.  She denies any new medications, skin care products, unusual foods or other specific allergens that she can think of.  She denies any tongue or lip swelling, sore throat, difficulty swallowing or breathing, wheezing or history of severe allergic reactions or anaphylaxis.    Review of Systems    See HPI    Objective      Vitals: /78   Pulse 90   Temp 98.8  F (37.1  C) (Tympanic)   Resp 18   Wt 60.8 kg (134 lb)   SpO2 99%   BMI 25.32 kg/m      Patient Vitals for the past 24 hrs:   BP Temp Temp src Pulse Resp SpO2 Weight   12/18/23 1047 116/78 98.8  F (37.1  C) Tympanic 90 18 99 % 60.8 kg (134 lb)       Vital signs reviewed by: Iglesia Pittman  CHELI    Physical Exam   Constitutional: Patient is alert and cooperative. No acute distress.  Mouth: Mucous membranes are moist. Normal tongue and tonsil. Posterior oropharynx is clear.  Cardiovascular: Regular rate and rhythm  Pulmonary/Chest: Lungs are clear to auscultation throughout. Effort normal. No respiratory distress. No wheezes, rales or rhonchi  Neurological: Alert and oriented x3.   Skin: Urticarial rash diffusely over the abdomen left hip and back.  Blanches normally.  No vesicles, petechia or purpura.  No signs of erythema multiform on target sign lesions.  Psychiatric:The patient has a normal mood and affect.       Iglesia Pittman PA-C, December 18, 2023

## 2023-12-19 ENCOUNTER — MYC MEDICAL ADVICE (OUTPATIENT)
Dept: NEUROSURGERY | Facility: CLINIC | Age: 73
End: 2023-12-19
Payer: MEDICARE

## 2024-01-02 DIAGNOSIS — I67.1 CEREBRAL ANEURYSM, NONRUPTURED: Primary | ICD-10-CM

## 2024-01-02 NOTE — PROGRESS NOTES
Per verbal orders from Dr. Bryan we will repeat an MRI and MRA in 8/2024. This will be 12 months after procedure.  Cecilia Gonzalez RN 1/2/2024 4:24 PM

## 2024-01-17 ENCOUNTER — OFFICE VISIT (OUTPATIENT)
Dept: AUDIOLOGY | Facility: CLINIC | Age: 74
End: 2024-01-17
Attending: NEUROLOGICAL SURGERY
Payer: MEDICARE

## 2024-01-17 DIAGNOSIS — H90.3 SENSORINEURAL HEARING LOSS (SNHL) OF BOTH EARS: Primary | ICD-10-CM

## 2024-01-17 DIAGNOSIS — H93.13 TINNITUS OF BOTH EARS: ICD-10-CM

## 2024-01-17 PROCEDURE — 92550 TYMPANOMETRY & REFLEX THRESH: CPT | Performed by: AUDIOLOGIST

## 2024-01-17 PROCEDURE — 92557 COMPREHENSIVE HEARING TEST: CPT | Performed by: AUDIOLOGIST

## 2024-01-17 NOTE — PROGRESS NOTES
AUDIOLOGY REPORT    SUBJECTIVE:  Crys Gama is a 73 year old female who was seen in the Audiology Clinic at the [facility] for audiologic evaluation, referred by Xochitl Bryan M.D. The patient reports bilateral tinnitus for several months. The patient denies otalgia, aural fullness, otorrhea, and dizziness.  The patient notes difficulty with communication in a variety of listening situations.  They were accompanied today by their daughter.    OBJECTIVE:  Abuse Screening:  Do you feel unsafe at home or work/school? No  Do you feel threatened by someone? No  Does anyone try to keep you from having contact with others, or doing things outside of your home? No  Physical signs of abuse present? No     Fall Risk Screen:  1. Have you fallen two or more times in the past year? No  2. Have you fallen and had an injury in the past year? No    Otoscopic exam indicates ears are clear of cerumen bilaterally     Pure Tone Thresholds assessed using conventional audiometry with good  reliability from 250-8000 Hz bilaterally using insert earphones and circumaural headphones     RIGHT:  mild sloping to severe sensorineural hearing loss    LEFT:    mild sloping to severe sensorineural hearing loss    Tympanogram:    RIGHT: normal eardrum mobility    LEFT:   normal eardrum mobility    Reflexes (reported by stimulus ear):  RIGHT: Ipsilateral is absent at frequencies tested  RIGHT: Contralateral is absent at frequencies tested  LEFT:   Ipsilateral is absent at frequencies tested  LEFT:   Contralateral is absent at frequencies tested      Speech Reception Threshold:    RIGHT: 30 dB HL    LEFT:   35 dB HL  Word Recognition Score:     RIGHT: 100% at 70 dB HL using NU-6 recorded word list.    LEFT:   100% at 70 dB HL using NU-6 recorded word list.      ASSESSMENT:     ICD-10-CM    1. Sensorineural hearing loss (SNHL) of both ears  H90.3 Cmprhn Audiometry Thrshld Eval & Speech Recog (79641)      Tymps / Reflex   (84105)      2. Tinnitus of both ears  H93.13 Adult Audiology  Referral        Today s results were discussed with the patient in detail.     PLAN:  Patient was counseled regarding hearing loss and impact on communication.  Patient is a good candidate for amplification at this time. It is recommended that the patient be retested in approximately 1 year or sooner if new concerns arise.  Please call this clinic with questions regarding these results or recommendations.        Lidia Pickard.  Doctor of Audiology  [place] License # [id number]

## 2024-01-21 ENCOUNTER — MYC MEDICAL ADVICE (OUTPATIENT)
Dept: NEUROSURGERY | Facility: CLINIC | Age: 74
End: 2024-01-21
Payer: MEDICARE

## 2024-01-21 DIAGNOSIS — I63.411 EMBOLIC STROKE INVOLVING RIGHT MIDDLE CEREBRAL ARTERY (H): Primary | ICD-10-CM

## 2024-01-22 RX ORDER — CILOSTAZOL 100 MG/1
TABLET ORAL
Qty: 60 TABLET | Refills: 3 | Status: SHIPPED | OUTPATIENT
Start: 2024-01-22

## 2024-01-22 NOTE — TELEPHONE ENCOUNTER
Sent in Cilastazol 100mg two times daily per message from Dr. Bryan,   Sure. Cilastazol would be very fine.   Patient notified.   Cecilia Gonzalez RN 1/22/2024 9:31 AM

## 2024-01-24 ENCOUNTER — TELEPHONE (OUTPATIENT)
Dept: NEUROSURGERY | Facility: CLINIC | Age: 74
End: 2024-01-24
Payer: MEDICARE

## 2024-02-06 ENCOUNTER — TELEPHONE (OUTPATIENT)
Dept: NEUROLOGY | Facility: CLINIC | Age: 74
End: 2024-02-06
Payer: MEDICARE

## 2024-02-06 NOTE — TELEPHONE ENCOUNTER
Spoke with Dr. Bryan. Per verbal order, patient to discontinue Cilostazol and just continue on Plavix. Patient notified via [employer]. Encouraged to call with questions and concerns.   Cecilia Gonzalez RN 2/6/2024 4:31 PM

## 2024-03-13 ENCOUNTER — MYC MEDICAL ADVICE (OUTPATIENT)
Dept: DERMATOLOGY | Facility: CLINIC | Age: 74
End: 2024-03-13
Payer: MEDICARE

## 2024-04-14 ENCOUNTER — HEALTH MAINTENANCE LETTER (OUTPATIENT)
Age: 74
End: 2024-04-14

## 2024-04-30 DIAGNOSIS — I67.1 CEREBRAL ANEURYSM, NONRUPTURED: ICD-10-CM

## 2024-05-01 RX ORDER — CLOPIDOGREL BISULFATE 75 MG/1
75 TABLET ORAL DAILY
Qty: 90 TABLET | Refills: 3 | Status: SHIPPED | OUTPATIENT
Start: 2024-05-01

## 2024-05-01 NOTE — TELEPHONE ENCOUNTER
Per verbal order from Dr. Bryan on 2/6/24, see TE, patient to continue on Plavix. Refills ordered.   Cecilia Gonzalez RN 5/1/2024 10:28 AM

## 2024-05-20 ENCOUNTER — ANCILLARY PROCEDURE (OUTPATIENT)
Dept: MAMMOGRAPHY | Facility: CLINIC | Age: 74
End: 2024-05-20
Attending: INTERNAL MEDICINE
Payer: MEDICARE

## 2024-05-20 DIAGNOSIS — Z12.31 VISIT FOR SCREENING MAMMOGRAM: ICD-10-CM

## 2024-05-20 PROCEDURE — 77067 SCR MAMMO BI INCL CAD: CPT | Mod: GC

## 2024-05-20 PROCEDURE — 77063 BREAST TOMOSYNTHESIS BI: CPT | Mod: GC

## 2024-06-05 ENCOUNTER — OFFICE VISIT (OUTPATIENT)
Dept: DERMATOLOGY | Facility: CLINIC | Age: 74
End: 2024-06-05
Attending: PHYSICIAN ASSISTANT
Payer: MEDICARE

## 2024-06-05 DIAGNOSIS — D22.9 MULTIPLE BENIGN NEVI: ICD-10-CM

## 2024-06-05 DIAGNOSIS — L57.0 ACTINIC KERATOSIS: ICD-10-CM

## 2024-06-05 DIAGNOSIS — L81.4 LENTIGINES: ICD-10-CM

## 2024-06-05 DIAGNOSIS — D18.01 CHERRY ANGIOMA: Primary | ICD-10-CM

## 2024-06-05 DIAGNOSIS — L82.1 SEBORRHEIC KERATOSES: ICD-10-CM

## 2024-06-05 DIAGNOSIS — Z85.828 HISTORY OF NONMELANOMA SKIN CANCER: ICD-10-CM

## 2024-06-05 PROCEDURE — 17000 DESTRUCT PREMALG LESION: CPT | Performed by: PHYSICIAN ASSISTANT

## 2024-06-05 PROCEDURE — 99213 OFFICE O/P EST LOW 20 MIN: CPT | Mod: 25 | Performed by: PHYSICIAN ASSISTANT

## 2024-06-05 ASSESSMENT — PAIN SCALES - GENERAL: PAINLEVEL: NO PAIN (0)

## 2024-06-05 NOTE — NURSING NOTE
Crys Gama's goals for this visit include:   Chief Complaint   Patient presents with    Skin Check     FBSE, area of concern on right side of lip that was previously treated with cryo.  HX of NMSC.        She requests these members of her care team be copied on today's visit information:     PCP: Roxanna Hernandez    Referring Provider:  Huma Garcia PA-C  [address] [place] [id number]    There were no vitals taken for this visit.    Do you need any medication refills at today's visit?     Teresita Nicholas on 6/5/2024 at 4:01 PM

## 2024-06-05 NOTE — PATIENT INSTRUCTIONS
Patient Education       Proper skin care from [facility]:    -Eliminate harsh soaps as they strip the natural oils from the skin, often resulting in dry itchy skin ( i.e. Dial, Zest, [place])  -Use mild soaps such as Cetaphil or Dove Sensitive Skin in the shower. You do not need to use soap on arms, legs, and trunk every time you shower unless visibly soiled.   -Avoid hot or cold showers.  -After showering, lightly dry off and apply moisturizing within 2-3 minutes. This will help trap moisture in the skin.   -Aggressive use of a moisturizer at least 1-2 times a day to the entire body (including -Vanicream, Cetaphil, Aquaphor or Cerave) and moisturize hands after every washing.  -We recommend using moisturizers that come in a tub that needs to be scooped out, not a pump. This has more of an oil base. It will hold moisture in your skin much better than a water base moisturizer. The above recommended are non-pore clogging.      Wear a sunscreen with at least SPF 30 on your face, ears, neck and V of the chest daily. Wear sunscreen on other areas of the body if those areas are exposed to the sun throughout the day. Sunscreens can contain physical and/or chemical blockers. Physical blockers are less likely to clog pores, these include zinc oxide and titanium dioxide. Reapply every two hour and after swimming.     Sunscreen examples: https://www.ewg.org/sunscreen/    UV radiation  UVA radiation remains constant throughout the day and throughout the year. It is a longer wavelength than UVB and therefore penetrates deeper into the skin leading to immediate and delayed tanning, photoaging, and skin cancer. 70-80% of UVA and UVB radiation occurs between the hours of 10am-2pm.  UVB radiation  UVB radiation causes the most harmful effects and is more significant during the summer months. However, snow and ice can reflect UVB radiation leading to skin damage during the winter months as well. UVB radiation is  responsible for tanning, burning, inflammation, delayed erythema (pinkness), pigmentation (brown spots), and skin cancer.     I recommend self monthly full body exams and yearly full body exams with a dermatology provider. If you develop a new or changing lesion please follow up for examination. Most skin cancers are pink and scaly or pink and pearly. However, we do see blue/brown/black skin cancers.  Consider the ABCDEs of melanoma when giving yourself your monthly full body exam ( don't forget the groin, buttocks, feet, toes, etc). A-asymmetry, B-borders, C-color, D-diameter, E-elevation or evolving. If you see any of these changes please follow up in clinic. If you cannot see your back I recommend purchasing a hand held mirror to use with a larger wall mirror.       Checking for Skin Cancer  You can find cancer early by checking your skin each month. There are 3 kinds of skin cancer. They are melanoma, basal cell carcinoma, and squamous cell carcinoma. Doing monthly skin checks is the best way to find new marks or skin changes. Follow the instructions below for checking your skin.   The ABCDEs of checking moles for melanoma   Check your moles or growths for signs of melanoma using ABCDE:   Asymmetry: the sides of the mole or growth don t match  Border: the edges are ragged, notched, or blurred  Color: the color within the mole or growth varies  Diameter: the mole or growth is larger than 6 mm (size of a pencil eraser)  Evolving: the size, shape, or color of the mole or growth is changing (evolving is not shown in the images below)    Checking for other types of skin cancer  Basal cell carcinoma or squamous cell carcinoma have symptoms such as:     A spot or mole that looks different from all other marks on your skin  Changes in how an area feels, such as itching, tenderness, or pain  Changes in the skin's surface, such as oozing, bleeding, or scaliness  A sore that does not heal  New swelling or redness beyond  the border of a mole    Who s at risk?  Anyone can get skin cancer. But you are at greater risk if you have:   Fair skin, light-colored hair, or light-colored eyes  Many moles or abnormal moles on your skin  A history of sunburns from sunlight or tanning beds  A family history of skin cancer  A history of exposure to radiation or chemicals  A weakened immune system  If you have had skin cancer in the past, you are at risk for recurring skin cancer.   How to check your skin  Do your monthly skin checkups in front of a full-length mirror. Check all parts of your body, including your:   Head (ears, face, neck, and scalp)  Torso (front, back, and sides)  Arms (tops, undersides, upper, and lower armpits)  Hands (palms, backs, and fingers, including under the nails)  Buttocks and genitals  Legs (front, back, and sides)  Feet (tops, soles, toes, including under the nails, and between toes)  If you have a lot of moles, take digital photos of them each month. Make sure to take photos both up close and from a distance. These can help you see if any moles change over time.   Most skin changes are not cancer. But if you see any changes in your skin, call your doctor right away. Only he or she can diagnose a problem. If you have skin cancer, seeing your doctor can be the first step toward getting the treatment that could save your life.   [employer] last reviewed this educational content on 4/1/2019 2000-2020 [employer]. [address] [place] [address], [place] [address]. All rights reserved. This information is not intended as a substitute for professional medical care. Always follow your healthcare professional's instructions.          Cryotherapy    What is it?  Use of a very cold liquid, such as liquid nitrogen, to freeze and destroy abnormal skin cells that need to be removed    What should I expect?  Tenderness and redness  A small blister that might grow and fill with dark purple blood. There may be crusting.  More  than one treatment may be needed if the lesions do not go away.    How do I care for the treated area?  Gently wash the area with your hands when bathing.  Use a thin layer of Vaseline to help with healing. You may use a Band-Aid.   The area should heal within 7-10 days and may leave behind a pink or lighter color.   Do not use an antibiotic or Neosporin ointment.   You may take acetaminophen (Tylenol) for pain.     Call your Doctor if you have:  Severe pain  Signs of infection (warmth, redness, cloudy yellow drainage, and or a bad smell)  Questions or concerns    Who should I call with questions?      [facility]: [phone]      [facility]: [phone]      For urgent needs outside of business hours call the [facility] [place] at [phone]        and ask for the dermatology resident on call

## 2024-06-05 NOTE — PROGRESS NOTES
[facility] Dermatology Note  Encounter Date: Jun 5, 2024  Office Visit      Dermatology Problem List:  FBSE: 6/5/24    1. Hx of NMSC  - SCC - L dorsal hand s/p Mohs 5/11/22  2. Vitiligo - began at age 25  3. AKs, s/p cryo  4. Patch, left wrist, monitored  5. Dermatitis, most likely eczema   - s/p triamcinolone 2020  6. COVID vaccine reaction, left bicep  ____________________________________________    Assessment & Plan:  # Actinic keratosis. X 1 L cheek   - Cryotherapy performed today, see procedure note below.    # Hx of NMSC  - Signs and Symptoms of non-melanoma skin cancer and ABCDEs of melanoma reviewed with patient. Patient encouraged to perform monthly self skin exams and educated on how to perform them. UV precautions reviewed with patient. Patient was asked about new or changing moles/lesions on body.   - Sunscreen: Apply 20 minutes prior to going outdoors and reapply every two hours, when wet or sweating. We recommend using an SPF 30 or higher, and to use one that is water resistant.     - Advised to monitor for changing, non-healing, bleeding, painful, changing, or otherwise symptomatic lesions  - Continue annual skin exams    # Benign findings: multiple benign nevi, lentigines, cherry angiomas, SKs  - edu on benign etiology  - Signs and Symptoms of non-melanoma skin cancer and ABCDEs of melanoma reviewed with patient. Patient encouraged to perform monthly self skin exams and educated on how to perform them. UV precautions reviewed with patient. Patient was asked about new or changing moles/lesions on body.   - Sunscreen: Apply 20 minutes prior to going outdoors and reapply every two hours, when wet or sweating. We recommend using an SPF 30 or higher, and to use one that is water resistant.     - RTC for change    Procedures Performed:   CRYOTHERAPY PROCEDURE NOTE: 1 lesions in the above locations were treated with liquid nitrogen utilizing a 5-10sec thaw time. Patient was advised that  the treated areas will become red, swollen, may develop a blister and then should crust and peal off in the next 1-2 weeks. Post-procedure instructions were provided.    Follow-up: 1 year(s) in-person, or earlier for new or changing lesions    Staff and scribe:    Scribe Disclosure:   I, HOLDEN GRAFF, am serving as a scribe; to document services personally performed by Huma Garcia PA-C -based on data collection and the provider's statements to me.     Provider Disclosure:  I agree with above History, Review of Systems, Physical exam and Plan.  I have reviewed the content of the documentation and have edited it as needed. I have personally performed the services documented here and the documentation accurately represents those services and the decisions I have made.      Electronically signed by:    All risks, benefits and alternatives were discussed with patient.  Patient is in agreement and understands the assessment and plan.  All questions were answered.    Huma Garcia PA-C, MPAS  [facility] [place]: Phone: [phone], Fax: [phone]  [facility]: Phone: [phone],  Fax: [phone]  [facility]: Phone: [phone], Fax: [phone]  ____________________________________________    CC: Skin Check (FBSE, area of concern on right side of lip that was previously treated with cryo.  HX of NMSC.  )      Reviewed patients past medical history and pertinent chart review prior to patient's visit today.     HPI:  Ms. Crys Gama is a 74 year old female who presents today as a return patient for FBSC. Last seen on 12/6/23 where an AK was treated with cryotherapy.     Today patient reported a spot of concern on her R side of her lip that was previously treated with cryotherapy.     Has a hx of NMSC    Patient is otherwise feeling well, without additional concerns.    Labs:  N/A    Physical  Exam:  Vitals: There were no vitals taken for this visit.  SKIN: Total skin excluding the undergarment areas was performed. The exam included the head/face, neck, both arms, chest, back, abdomen, both legs, digits and/or nails.    - - Quinones's skin type I - II, has <100 nevi  - There are dome shaped bright red papules on the trunk.   - Multiple regular brown pigmented macules and papules are identified on the trunk and extremities.   - Scattered brown macules on sun exposed areas.  - There are waxy stuck on tan to brown papules on the trunk.   - There is an erythematous macule with overyling adherent scale on the:  x 1 L cheek    -There are well healed surgical scars without erythema, nodularity or telangiectasias on the prior NMSC site, NERD  - No other lesions of concern on areas examined.     Medications:  Current Outpatient Medications   Medication Sig Dispense Refill    aspirin (ASA) 325 MG tablet Please begin taking 1 tablet 5 days prior to procedure. (Patient taking differently: Take 325 mg by mouth daily) 30 tablet 3    atorvastatin (LIPITOR) 40 MG tablet Take 40 mg by mouth daily      cetirizine (ZYRTEC) 10 MG tablet Take 10 mg by mouth daily      cilostazol (PLETAL) 100 MG tablet Please take 1 tab two times daily. 60 tablet 3    clopidogrel (PLAVIX) 75 MG tablet TAKE 1 TABLET EVERY DAY 90 tablet 3    COLLAGEN PO       cyanocobalamin (VITAMIN B-12) 1000 MCG tablet Take 1,000 mcg by mouth daily      donepezil (ARICEPT) 10 MG tablet Take 10 mg by mouth daily      mirtazapine (REMERON) 7.5 MG tablet Take 1 tablet by mouth At Bedtime      Multiple Vitamin (MULTI-DAY PO) Take 1 tablet by mouth daily.      predniSONE (DELTASONE) 20 MG tablet Take 2 tablets (40 mg) by mouth daily 10 tablet 0    Vitamin D3 (CHOLECALCIFEROL) 25 mcg (1000 units) tablet Take 25 mcg by mouth daily       No current facility-administered medications for this visit.      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

## 2024-06-05 NOTE — LETTER
6/5/2024      Crys Gama  [address]  [address]  [facility] [address]      Dear Colleague,    Thank you for referring your patient, Crys Gama, to the [facility]. Please see a copy of my visit note below.    [facility] Dermatology Note  Encounter Date: Jun 5, 2024  Office Visit      Dermatology Problem List:  FBSE: 6/5/24    1. Hx of NMSC  - SCC - L dorsal hand s/p Mohs 5/11/22  2. Vitiligo - began at age 25  3. AKs, s/p cryo  4. Patch, left wrist, monitored  5. Dermatitis, most likely eczema   - s/p triamcinolone 2020  6. COVID vaccine reaction, left bicep  ____________________________________________    Assessment & Plan:  # Actinic keratosis. X 1 L cheek   - Cryotherapy performed today, see procedure note below.    # Hx of NMSC  - Signs and Symptoms of non-melanoma skin cancer and ABCDEs of melanoma reviewed with patient. Patient encouraged to perform monthly self skin exams and educated on how to perform them. UV precautions reviewed with patient. Patient was asked about new or changing moles/lesions on body.   - Sunscreen: Apply 20 minutes prior to going outdoors and reapply every two hours, when wet or sweating. We recommend using an SPF 30 or higher, and to use one that is water resistant.     - Advised to monitor for changing, non-healing, bleeding, painful, changing, or otherwise symptomatic lesions  - Continue annual skin exams    # Benign findings: multiple benign nevi, lentigines, cherry angiomas, SKs  - edu on benign etiology  - Signs and Symptoms of non-melanoma skin cancer and ABCDEs of melanoma reviewed with patient. Patient encouraged to perform monthly self skin exams and educated on how to perform them. UV precautions reviewed with patient. Patient was asked about new or changing moles/lesions on body.   - Sunscreen: Apply 20 minutes prior to going outdoors and reapply every two hours, when wet or sweating. We recommend using an SPF  30 or higher, and to use one that is water resistant.     - RTC for change    Procedures Performed:   CRYOTHERAPY PROCEDURE NOTE: 1 lesions in the above locations were treated with liquid nitrogen utilizing a 5-10sec thaw time. Patient was advised that the treated areas will become red, swollen, may develop a blister and then should crust and peal off in the next 1-2 weeks. Post-procedure instructions were provided.    Follow-up: 1 year(s) in-person, or earlier for new or changing lesions    Staff and scribe:    Scribe Disclosure:   I, HOLDEN GRAFF, am serving as a scribe; to document services personally performed by Huma Garcia PA-C -based on data collection and the provider's statements to me.     Provider Disclosure:  I agree with above History, Review of Systems, Physical exam and Plan.  I have reviewed the content of the documentation and have edited it as needed. I have personally performed the services documented here and the documentation accurately represents those services and the decisions I have made.      Electronically signed by:    All risks, benefits and alternatives were discussed with patient.  Patient is in agreement and understands the assessment and plan.  All questions were answered.    Huma Garcia PA-C, MPAS  [facility] [place]: Phone: [phone], Fax: [phone]  [facility]: Phone: [phone],  Fax: [phone]  [facility]: Phone: [phone], Fax: [phone]  ____________________________________________    CC: Skin Check (FBSE, area of concern on right side of lip that was previously treated with cryo.  HX of NMSC.  )      Reviewed patients past medical history and pertinent chart review prior to patient's visit today.     HPI:  Ms. Crys Gama is a 74 year old female who presents today as a return patient for FBSC. Last seen on 12/6/23 where an AK was treated  with cryotherapy.     Today patient reported a spot of concern on her R side of her lip that was previously treated with cryotherapy.     Has a hx of NMSC    Patient is otherwise feeling well, without additional concerns.    Labs:  N/A    Physical Exam:  Vitals: There were no vitals taken for this visit.  SKIN: Total skin excluding the undergarment areas was performed. The exam included the head/face, neck, both arms, chest, back, abdomen, both legs, digits and/or nails.    - - Quinones's skin type I - II, has <100 nevi  - There are dome shaped bright red papules on the trunk.   - Multiple regular brown pigmented macules and papules are identified on the trunk and extremities.   - Scattered brown macules on sun exposed areas.  - There are waxy stuck on tan to brown papules on the trunk.   - There is an erythematous macule with overyling adherent scale on the:  x 1 L cheek    -There are well healed surgical scars without erythema, nodularity or telangiectasias on the prior NMSC site, NERD  - No other lesions of concern on areas examined.     Medications:  Current Outpatient Medications   Medication Sig Dispense Refill     aspirin (ASA) 325 MG tablet Please begin taking 1 tablet 5 days prior to procedure. (Patient taking differently: Take 325 mg by mouth daily) 30 tablet 3     atorvastatin (LIPITOR) 40 MG tablet Take 40 mg by mouth daily       cetirizine (ZYRTEC) 10 MG tablet Take 10 mg by mouth daily       cilostazol (PLETAL) 100 MG tablet Please take 1 tab two times daily. 60 tablet 3     clopidogrel (PLAVIX) 75 MG tablet TAKE 1 TABLET EVERY DAY 90 tablet 3     COLLAGEN PO        cyanocobalamin (VITAMIN B-12) 1000 MCG tablet Take 1,000 mcg by mouth daily       donepezil (ARICEPT) 10 MG tablet Take 10 mg by mouth daily       mirtazapine (REMERON) 7.5 MG tablet Take 1 tablet by mouth At Bedtime       Multiple Vitamin (MULTI-DAY PO) Take 1 tablet by mouth daily.       predniSONE (DELTASONE) 20 MG tablet Take 2  tablets (40 mg) by mouth daily 10 tablet 0     Vitamin D3 (CHOLECALCIFEROL) 25 mcg (1000 units) tablet Take 25 mcg by mouth daily       No current facility-administered medications for this visit.      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        Sincerely,        Huma Garcia PA-C

## 2024-06-17 ENCOUNTER — TELEPHONE (OUTPATIENT)
Dept: NEUROSURGERY | Facility: CLINIC | Age: 74
End: 2024-06-17
Payer: MEDICARE

## 2024-06-17 NOTE — TELEPHONE ENCOUNTER
Left Voicemail (1st Attempt) for the patient to call back and schedule the following:    Appointment type: Rtn vascular neurosurg - phone  Provider: Dr. Bryan  Return date: Double book on a Tuesday morning after the MRA's  Specialty phone number: [phone]  Additional appointment(s) needed: N/A  Additonal Notes: Cerebral aneurysm, nonruptured/ MRA's prior

## 2024-06-21 ENCOUNTER — TELEPHONE (OUTPATIENT)
Dept: NEUROSURGERY | Facility: CLINIC | Age: 74
End: 2024-06-21
Payer: MEDICARE

## 2024-07-18 ENCOUNTER — ANCILLARY PROCEDURE (OUTPATIENT)
Dept: MRI IMAGING | Facility: CLINIC | Age: 74
End: 2024-07-18
Attending: NEUROLOGICAL SURGERY
Payer: MEDICARE

## 2024-07-18 DIAGNOSIS — I67.1 CEREBRAL ANEURYSM, NONRUPTURED: ICD-10-CM

## 2024-07-18 PROCEDURE — G1010 CDSM STANSON: HCPCS | Performed by: STUDENT IN AN ORGANIZED HEALTH CARE EDUCATION/TRAINING PROGRAM

## 2024-07-18 PROCEDURE — 99207 MRA BRAIN (CIRCLE OF WILLIS) W/O CONTRAST: CPT | Mod: MG | Performed by: STUDENT IN AN ORGANIZED HEALTH CARE EDUCATION/TRAINING PROGRAM

## 2024-07-18 PROCEDURE — 70551 MRI BRAIN STEM W/O DYE: CPT | Mod: MG | Performed by: STUDENT IN AN ORGANIZED HEALTH CARE EDUCATION/TRAINING PROGRAM

## 2024-09-10 ENCOUNTER — VIRTUAL VISIT (OUTPATIENT)
Dept: NEUROSURGERY | Facility: CLINIC | Age: 74
End: 2024-09-10
Payer: MEDICARE

## 2024-09-10 DIAGNOSIS — I63.411 EMBOLIC STROKE INVOLVING RIGHT MIDDLE CEREBRAL ARTERY (H): ICD-10-CM

## 2024-09-10 DIAGNOSIS — I67.1 CEREBRAL ANEURYSM, NONRUPTURED: Primary | ICD-10-CM

## 2024-09-10 PROCEDURE — 99441 PR PHYSICIAN TELEPHONE EVALUATION 5-10 MIN: CPT | Mod: 93 | Performed by: PHYSICIAN ASSISTANT

## 2024-09-10 NOTE — PROGRESS NOTES
Virtual Visit Details    Type of service:  Telephone Visit   Phone call duration: 6 minutes   Originating Location (pt. Location): Home    Distant Location (provider location):  Off-site

## 2024-09-10 NOTE — PROGRESS NOTES
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 Bryan. This was found upon workup for memory changes. She experienced postoperative embolic strokes in the right hemisphere with surrounding edema, presenting with left sided weakness. She underwent balloon angioplasty after mild intimal hyperplasia was found with the flow diverter construct, but the strokes were unusual so she was kept on Plavix and aspirin. Earlier this year Mary developed hives and ultimately aspirin was discontinued; she's remained on Plavix alone. Today by phone she's accompanied by her daughter, Irene. Since her initial treatment, she's been diagnosed with Alzheimer's which has progressed. From a vascular perspective, Mary has been feeling well and denies new headaches, paresthesias or weakness since her last appointment with us.     Review of Systems:   Pertinent items are noted in HPI or as in patient entered ROS below, remainder of complete ROS is negative.     Physical Exam:   Exam today is limited due to telephone visit. Speech is normal. Answers questions appropriately.    Imaging:  We reviewed the MRI/MRA done 2024 which reveals patent right ICA flow diverting stent without evidence of recurrence or residual. No acute hemorrhage or infarct noted on MRI. No new aneurysm.     Assessment:  Right ophthalmic  artery aneurysm s/p endovascular flow diversion 8/17/2023 c/b postoperative right hemispheric embolic strokes s/p balloon angioplasty, follow up visit    Plan:  We reviewed the imaging today which is stable without evidence of new infarcts or other changes. We'll plan to see her back in 1 year with repeat MRA to follow the aneurysm post treatment. She'll remain on Plavix. They were encouraged to reach out with new concerns in the meantime.        Eunice Jones PA-C  Department of Neurosurgery

## 2024-09-10 NOTE — NURSING NOTE
Current patient location: [address] [place] [address] [address]  [address]  [facility] [address]    Is the patient currently in the state of [place]? YES    Visit mode:TELEPHONE    If the visit is dropped, the patient can be reconnected by: TELEPHONE VISIT: Phone number:   Telephone Information:   Mobile [phone]       Will anyone else be joining the visit? NO  (If patient encounters technical issues they should call [phone])    How would you like to obtain your AVS? MyChart    Are changes needed to the allergy or medication list? Pt stated no med changes    Are refills needed on medications prescribed by this physician? NO    Rooming Documentation:  Not applicable      Reason for visit: Telephone (Cerebral aneurysm non ruptured )    Ade HUGHES

## 2024-09-10 NOTE — PATIENT INSTRUCTIONS
Return in 1 year, with MRA brain prior to appointment.  Please reach out sooner with new concerns.

## 2025-03-02 ENCOUNTER — HEALTH MAINTENANCE LETTER (OUTPATIENT)
Age: 75
End: 2025-03-02

## 2025-05-19 DIAGNOSIS — I67.1 CEREBRAL ANEURYSM, NONRUPTURED: ICD-10-CM

## 2025-05-19 RX ORDER — CLOPIDOGREL BISULFATE 75 MG/1
75 TABLET ORAL DAILY
Qty: 90 TABLET | Refills: 3 | Status: SHIPPED | OUTPATIENT
Start: 2025-05-19

## 2025-06-19 ENCOUNTER — OFFICE VISIT (OUTPATIENT)
Dept: DERMATOLOGY | Facility: CLINIC | Age: 75
End: 2025-06-19
Payer: MEDICARE

## 2025-06-19 DIAGNOSIS — Z12.83 ENCOUNTER FOR SCREENING FOR MALIGNANT NEOPLASM OF SKIN: Primary | ICD-10-CM

## 2025-06-19 DIAGNOSIS — L81.4 LENTIGINES: ICD-10-CM

## 2025-06-19 DIAGNOSIS — D18.01 CHERRY ANGIOMA: ICD-10-CM

## 2025-06-19 DIAGNOSIS — L82.1 SEBORRHEIC KERATOSES: ICD-10-CM

## 2025-06-19 DIAGNOSIS — D22.9 MULTIPLE NEVI: ICD-10-CM

## 2025-06-19 RX ORDER — FEXOFENADINE HCL 180 MG/1
180 TABLET ORAL
COMMUNITY
Start: 2025-01-21

## 2025-06-19 NOTE — LETTER
6/19/2025      Crys Gama  [address]  [address]  [facility] [address]      Dear Colleague,    Thank you for referring your patient, Crys Gama, to the [facility]. Please see a copy of my visit note below.    [facility] Dermatology Note  Encounter Date: Jun 19, 2025  Office Visit     Reviewed patient's past medical history and pertinent chart review prior to patient's visit today.     Dermatology Problem List:  1. Hx of NMSC  - SCC - L dorsal hand s/p Mohs 5/11/22  2. Vitiligo - began at age 25  3. AKs, s/p cryo  4. Patch, left wrist, monitored  5. Dermatitis, most likely eczema   - s/p triamcinolone 2020  6. COVID vaccine reaction, left bicep    ____________________________________________    Assessment & Plan:     # Onychomycosis   - We discussed the difficulty associated with treating fungal infections of the nails. We discussed treatment with oral medications, nail lacquer, as well as the option to not treat. The patient elected to not treat.    # Personal history of nonmelanoma skin cancer  - No signs of recurrence. Continued observation recommended.   - Sun protection: Counseled SPF 30+ sunscreen, UPF clothing, sun avoidance, tanning bed avoidance.    # Multiple nevi, trunk and extremities  # Solar lentigines  - No concerning features on dermoscopy. We discussed the importance of self exams at home. ABCDE criteria and importance of SPF 30+ sunscreen and photoprotective clothing reviewed.     # Cherry angiomas  # Seborrheic keratoses  - We discussed the benign nature of the skin lesions. No treatment required. Continued observation recommended. Follow up with any concerns.      Follow-up:  Annual for follow up full body skin exam, as needed for new or changing lesions or new concerns    All risks, benefits and alternatives were discussed with patient.  Patient is in agreement and understands the assessment and plan.  All questions were  answered.  Lexi Schwartz PA-C  [facility] Dermatology    ____________________________________________    CC: Skin Check (FBSC/Concerns: None)    HPI:  Ms. Crys Gama is a(n) 75 year old female who presents today as a return patient for a full body skin cancer screening. The patient has a history of nonmelanoma skin cancer. No cutaneous concerns. The patient reports trying to be diligent with photoprotection.      Physical Exam:  Vitals: There were no vitals taken for this visit.   SKIN: Total skin excluding the genitalia areas was performed. The exam included the scalp, face, neck, bilateral arms, chest, back, abdomen, bilateral legs, digits, mons pubis, buttocks, and nails.   - Quinones I.  - Yellowing with subungual debris involving the toenail(s).   - Multiple tan/brown macules and papules scattered throughout exam, consistent with benign nevi. No concerning features on dermoscopy.   - Scattered tan, homogenous macules scattered on sun exposed skin, consistent with solar lentigines.   - Scattered waxy, stuck on appearing papules and patches, consistent with seborrheic keratoses.  - Several 1-2 mm red dome shaped symmetric papules, consistent with cherry angiomas.     - No other lesions of concern on areas examined.     Medications:  Current Outpatient Medications   Medication Sig Dispense Refill     atorvastatin (LIPITOR) 40 MG tablet Take 40 mg by mouth daily       cetirizine (ZYRTEC) 10 MG tablet Take 10 mg by mouth daily       cilostazol (PLETAL) 100 MG tablet Please take 1 tab two times daily. 60 tablet 3     clopidogrel (PLAVIX) 75 MG tablet TAKE 1 TABLET EVERY DAY 90 tablet 3     COLLAGEN PO        cyanocobalamin (VITAMIN B-12) 1000 MCG tablet Take 1,000 mcg by mouth daily       diazepam (VALIUM) 2 MG tablet Please take 1 tab 40 minutes prior to scan and 1 tab 10 minutes prior to scan. 2 tablet 0     donepezil (ARICEPT) 10 MG tablet Take 10 mg by mouth daily       fexofenadine (ALLEGRA)  180 MG tablet Take 180 mg by mouth.       mirtazapine (REMERON) 7.5 MG tablet Take 1 tablet by mouth At Bedtime       Multiple Vitamin (MULTI-DAY PO) Take 1 tablet by mouth daily.       predniSONE (DELTASONE) 20 MG tablet Take 2 tablets (40 mg) by mouth daily 10 tablet 0     No current facility-administered medications for this visit.      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    Again, thank you for allowing me to participate in the care of your patient.        Sincerely,        Lexi Schwartz PA-C    Electronically signed

## 2025-06-19 NOTE — PATIENT INSTRUCTIONS
Proper skin care from [facility]:    -Eliminate harsh soaps as they strip the natural oils from the skin, often resulting in dry itchy skin ( i.e. Dial, Zest, [place] Spring)  -Use mild soaps such as Cetaphil or Dove Sensitive Skin in the shower. You do not need to use soap on arms, legs, and trunk every time you shower unless visibly soiled.   -Avoid hot or cold showers.  -After showering, lightly dry off and apply moisturizing within 2-3 minutes. This will help trap moisture in the skin.   -Aggressive use of a moisturizer at least 1-2 times a day to the entire body (including -Vanicream, Cetaphil, Aquaphor or Cerave) and moisturize hands after every washing.  -We recommend using moisturizers that come in a tub that needs to be scooped out, not a pump. This has more of an oil base. It will hold moisture in your skin much better than a water base moisturizer. The above recommended are non-pore clogging.      Wear a sunscreen with at least SPF 30 on your face, ears, neck and V of the chest daily. Wear sunscreen on other areas of the body if those areas are exposed to the sun throughout the day. Sunscreens can contain physical and/or chemical blockers. Physical blockers are less likely to clog pores, these include zinc oxide and titanium dioxide. Reapply every two hour and after swimming.     Sunscreen examples: https://www.ewg.org/sunscreen/    UV radiation  UVA radiation remains constant throughout the day and throughout the year. It is a longer wavelength than UVB and therefore penetrates deeper into the skin leading to immediate and delayed tanning, photoaging, and skin cancer. 70-80% of UVA and UVB radiation occurs between the hours of 10am-2pm.  UVB radiation  UVB radiation causes the most harmful effects and is more significant during the summer months. However, snow and ice can reflect UVB radiation leading to skin damage during the winter months as well. UVB radiation is responsible for tanning,  burning, inflammation, delayed erythema (pinkness), pigmentation (brown spots), and skin cancer.     I recommend self monthly full body exams and yearly full body exams with a dermatology provider. If you develop a new or changing lesion please follow up for examination. Most skin cancers are pink and scaly or pink and pearly. However, we do see blue/brown/black skin cancers.  Consider the ABCDEs of melanoma when giving yourself your monthly full body exam ( don't forget the groin, buttocks, feet, toes, etc). A-asymmetry, B-borders, C-color, D-diameter, E-elevation or evolving. If you see any of these changes please follow up in clinic. If you cannot see your back I recommend purchasing a hand held mirror to use with a larger wall mirror.       Checking for Skin Cancer  You can find cancer early by checking your skin each month. There are 3 kinds of skin cancer. They are melanoma, basal cell carcinoma, and squamous cell carcinoma. Doing monthly skin checks is the best way to find new marks or skin changes. Follow the instructions below for checking your skin.   The ABCDEs of checking moles for melanoma   Check your moles or growths for signs of melanoma using ABCDE:   Asymmetry: the sides of the mole or growth don t match  Border: the edges are ragged, notched, or blurred  Color: the color within the mole or growth varies  Diameter: the mole or growth is larger than 6 mm (size of a pencil eraser)  Evolving: the size, shape, or color of the mole or growth is changing (evolving is not shown in the images below)    Checking for other types of skin cancer  Basal cell carcinoma or squamous cell carcinoma have symptoms such as:     A spot or mole that looks different from all other marks on your skin  Changes in how an area feels, such as itching, tenderness, or pain  Changes in the skin's surface, such as oozing, bleeding, or scaliness  A sore that does not heal  New swelling or redness beyond the border of a  mole    Who s at risk?  Anyone can get skin cancer. But you are at greater risk if you have:   Fair skin, light-colored hair, or light-colored eyes  Many moles or abnormal moles on your skin  A history of sunburns from sunlight or tanning beds  A family history of skin cancer  A history of exposure to radiation or chemicals  A weakened immune system  If you have had skin cancer in the past, you are at risk for recurring skin cancer.   How to check your skin  Do your monthly skin checkups in front of a full-length mirror. Check all parts of your body, including your:   Head (ears, face, neck, and scalp)  Torso (front, back, and sides)  Arms (tops, undersides, upper, and lower armpits)  Hands (palms, backs, and fingers, including under the nails)  Buttocks and genitals  Legs (front, back, and sides)  Feet (tops, soles, toes, including under the nails, and between toes)  If you have a lot of moles, take digital photos of them each month. Make sure to take photos both up close and from a distance. These can help you see if any moles change over time.   Most skin changes are not cancer. But if you see any changes in your skin, call your doctor right away. Only he or she can diagnose a problem. If you have skin cancer, seeing your doctor can be the first step toward getting the treatment that could save your life.   [employer] last reviewed this educational content on 4/1/2019 2000-2020 [employer]. [address] [place] [address], [place] [address]. All rights reserved. This information is not intended as a substitute for professional medical care. Always follow your healthcare professional's instructions.       When should I call my doctor?  If you are worsening or not improving, please, contact us or seek urgent care as noted below.     Who should I call with questions (adults)?    [facility] [phone]  For urgent needs outside of business hours call the [facility] at  [phone] and ask for the dermatology resident on call to be paged  If this is a medical emergency and you are unable to reach an ER, Call [phone]      If you need a prescription refill, please contact your pharmacy. Refills are approved or denied by our Physicians during normal business hours, Monday through Friday.  Per office policy, refills will not be granted if you have not been seen within the past year (or sooner depending on the condition).

## 2025-06-19 NOTE — PROGRESS NOTES
[facility] Dermatology Note  Encounter Date: Jun 19, 2025  Office Visit     Reviewed patient's past medical history and pertinent chart review prior to patient's visit today.     Dermatology Problem List:  1. Hx of NMSC  - SCC - L dorsal hand s/p Mohs 5/11/22  2. Vitiligo - began at age 25  3. AKs, s/p cryo  4. Patch, left wrist, monitored  5. Dermatitis, most likely eczema   - s/p triamcinolone 2020  6. COVID vaccine reaction, left bicep    ____________________________________________    Assessment & Plan:     # Onychomycosis   - We discussed the difficulty associated with treating fungal infections of the nails. We discussed treatment with oral medications, nail lacquer, as well as the option to not treat. The patient elected to not treat.    # Personal history of nonmelanoma skin cancer  - No signs of recurrence. Continued observation recommended.   - Sun protection: Counseled SPF 30+ sunscreen, UPF clothing, sun avoidance, tanning bed avoidance.    # Multiple nevi, trunk and extremities  # Solar lentigines  - No concerning features on dermoscopy. We discussed the importance of self exams at home. ABCDE criteria and importance of SPF 30+ sunscreen and photoprotective clothing reviewed.     # Cherry angiomas  # Seborrheic keratoses  - We discussed the benign nature of the skin lesions. No treatment required. Continued observation recommended. Follow up with any concerns.      Follow-up:  Annual for follow up full body skin exam, as needed for new or changing lesions or new concerns    All risks, benefits and alternatives were discussed with patient.  Patient is in agreement and understands the assessment and plan.  All questions were answered.  Lexi Schwartz PA-C  [facility]    ____________________________________________    CC: Skin Check (FBSC/Concerns: None)    HPI:  Ms. Crys Gama is a(n) 75 year old female who presents today as a return patient for a full body  skin cancer screening. The patient has a history of nonmelanoma skin cancer. No cutaneous concerns. The patient reports trying to be diligent with photoprotection.      Physical Exam:  Vitals: There were no vitals taken for this visit.   SKIN: Total skin excluding the genitalia areas was performed. The exam included the scalp, face, neck, bilateral arms, chest, back, abdomen, bilateral legs, digits, mons pubis, buttocks, and nails.   - Quinones I.  - Yellowing with subungual debris involving the toenail(s).   - Multiple tan/brown macules and papules scattered throughout exam, consistent with benign nevi. No concerning features on dermoscopy.   - Scattered tan, homogenous macules scattered on sun exposed skin, consistent with solar lentigines.   - Scattered waxy, stuck on appearing papules and patches, consistent with seborrheic keratoses.  - Several 1-2 mm red dome shaped symmetric papules, consistent with cherry angiomas.     - No other lesions of concern on areas examined.     Medications:  Current Outpatient Medications   Medication Sig Dispense Refill    atorvastatin (LIPITOR) 40 MG tablet Take 40 mg by mouth daily      cetirizine (ZYRTEC) 10 MG tablet Take 10 mg by mouth daily      cilostazol (PLETAL) 100 MG tablet Please take 1 tab two times daily. 60 tablet 3    clopidogrel (PLAVIX) 75 MG tablet TAKE 1 TABLET EVERY DAY 90 tablet 3    COLLAGEN PO       cyanocobalamin (VITAMIN B-12) 1000 MCG tablet Take 1,000 mcg by mouth daily      diazepam (VALIUM) 2 MG tablet Please take 1 tab 40 minutes prior to scan and 1 tab 10 minutes prior to scan. 2 tablet 0    donepezil (ARICEPT) 10 MG tablet Take 10 mg by mouth daily      fexofenadine (ALLEGRA) 180 MG tablet Take 180 mg by mouth.      mirtazapine (REMERON) 7.5 MG tablet Take 1 tablet by mouth At Bedtime      Multiple Vitamin (MULTI-DAY PO) Take 1 tablet by mouth daily.      predniSONE (DELTASONE) 20 MG tablet Take 2 tablets (40 mg) by mouth daily 10 tablet 0      No current facility-administered medications for this visit.      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

## 2025-08-02 ENCOUNTER — MYC MEDICAL ADVICE (OUTPATIENT)
Dept: NEUROSURGERY | Facility: CLINIC | Age: 75
End: 2025-08-02
Payer: MEDICARE

## 2025-08-02 DIAGNOSIS — F41.9 ANXIETY: Primary | ICD-10-CM

## 2025-08-05 RX ORDER — DIAZEPAM 2 MG/1
TABLET ORAL
Status: SHIPPED
Start: 2025-08-05

## 2025-08-11 ENCOUNTER — ANCILLARY PROCEDURE (OUTPATIENT)
Dept: MRI IMAGING | Facility: CLINIC | Age: 75
End: 2025-08-11
Attending: PHYSICIAN ASSISTANT
Payer: MEDICARE

## 2025-08-11 DIAGNOSIS — I67.1 CEREBRAL ANEURYSM, NONRUPTURED: ICD-10-CM

## 2025-08-11 PROCEDURE — 70544 MR ANGIOGRAPHY HEAD W/O DYE: CPT | Performed by: STUDENT IN AN ORGANIZED HEALTH CARE EDUCATION/TRAINING PROGRAM

## 2025-08-28 ENCOUNTER — TELEPHONE (OUTPATIENT)
Dept: NEUROSURGERY | Facility: CLINIC | Age: 75
End: 2025-08-28
Payer: MEDICARE

## (undated) RX ORDER — HEPARIN SODIUM 200 [USP'U]/100ML
INJECTION, SOLUTION INTRAVENOUS
Status: DISPENSED
Start: 2023-10-07

## (undated) RX ORDER — FENTANYL CITRATE 50 UG/ML
INJECTION, SOLUTION INTRAMUSCULAR; INTRAVENOUS
Status: DISPENSED
Start: 2023-10-07

## (undated) RX ORDER — ATROPINE SULFATE 0.1 MG/ML
INJECTION INTRAVENOUS
Status: DISPENSED
Start: 2023-10-07

## (undated) RX ORDER — LIDOCAINE HYDROCHLORIDE 10 MG/ML
INJECTION, SOLUTION EPIDURAL; INFILTRATION; INTRACAUDAL; PERINEURAL
Status: DISPENSED
Start: 2023-10-07

## (undated) RX ORDER — HYDROMORPHONE HYDROCHLORIDE 1 MG/ML
INJECTION, SOLUTION INTRAMUSCULAR; INTRAVENOUS; SUBCUTANEOUS
Status: DISPENSED
Start: 2023-08-17

## (undated) RX ORDER — FENTANYL CITRATE 50 UG/ML
INJECTION, SOLUTION INTRAMUSCULAR; INTRAVENOUS
Status: DISPENSED
Start: 2023-08-17

## (undated) RX ORDER — HEPARIN SODIUM 200 [USP'U]/100ML
INJECTION, SOLUTION INTRAVENOUS
Status: DISPENSED
Start: 2023-08-17

## (undated) RX ORDER — HEPARIN SODIUM 1000 [USP'U]/ML
INJECTION, SOLUTION INTRAVENOUS; SUBCUTANEOUS
Status: DISPENSED
Start: 2023-10-07

## (undated) RX ORDER — GLYCOPYRROLATE 0.2 MG/ML
INJECTION, SOLUTION INTRAMUSCULAR; INTRAVENOUS
Status: DISPENSED
Start: 2023-10-07

## (undated) RX ORDER — HEPARIN SODIUM 1000 [USP'U]/ML
INJECTION, SOLUTION INTRAVENOUS; SUBCUTANEOUS
Status: DISPENSED
Start: 2023-08-17